# Patient Record
Sex: FEMALE | Race: OTHER | Employment: FULL TIME | URBAN - METROPOLITAN AREA
[De-identification: names, ages, dates, MRNs, and addresses within clinical notes are randomized per-mention and may not be internally consistent; named-entity substitution may affect disease eponyms.]

---

## 2018-06-08 ENCOUNTER — OFFICE VISIT (OUTPATIENT)
Dept: INTERNAL MEDICINE CLINIC | Facility: CLINIC | Age: 33
End: 2018-06-08

## 2018-06-08 VITALS
WEIGHT: 215 LBS | OXYGEN SATURATION: 98 % | SYSTOLIC BLOOD PRESSURE: 128 MMHG | DIASTOLIC BLOOD PRESSURE: 85 MMHG | HEIGHT: 59 IN | HEART RATE: 89 BPM | BODY MASS INDEX: 43.34 KG/M2 | TEMPERATURE: 98 F

## 2018-06-08 DIAGNOSIS — Z12.4 PAP SMEAR FOR CERVICAL CANCER SCREENING: ICD-10-CM

## 2018-06-08 DIAGNOSIS — Z00.00 ROUTINE ADULT HEALTH MAINTENANCE: Primary | ICD-10-CM

## 2018-06-08 DIAGNOSIS — IMO0001 UNCONTROLLED TYPE 2 DIABETES MELLITUS WITHOUT COMPLICATION, WITHOUT LONG-TERM CURRENT USE OF INSULIN: ICD-10-CM

## 2018-06-08 DIAGNOSIS — Z23 NEED FOR VACCINATION: ICD-10-CM

## 2018-06-08 DIAGNOSIS — N93.8 DUB (DYSFUNCTIONAL UTERINE BLEEDING): ICD-10-CM

## 2018-06-08 PROCEDURE — 90471 IMMUNIZATION ADMIN: CPT | Performed by: INTERNAL MEDICINE

## 2018-06-08 PROCEDURE — 99385 PREV VISIT NEW AGE 18-39: CPT | Performed by: INTERNAL MEDICINE

## 2018-06-08 PROCEDURE — 90732 PPSV23 VACC 2 YRS+ SUBQ/IM: CPT | Performed by: INTERNAL MEDICINE

## 2018-06-08 PROCEDURE — 81002 URINALYSIS NONAUTO W/O SCOPE: CPT | Performed by: INTERNAL MEDICINE

## 2018-06-08 NOTE — PROGRESS NOTES
HPI:    Patient ID: Luba Shrestha is a 28year old female.     HPI   Luba Shrestha is a 28year old female who presents for a complete physical exam.   HPI:   Patient presents with:  Physical: due for labs ,patient dx with DM2 not on medication  Diabetes  Patient Grandfather      Type 1, IDDM with retinoapthy.     • Stroke Paternal Grandfather    • PVD [OTHER] Paternal Grandfather    • retinopathy [OTHER] Paternal Grandfather       Social History:  Social History    Marital status: Single              Spouse name: stridor. Cardiovascular: Negative. Negative for chest pain, palpitations and leg swelling. Gastrointestinal: Negative for abdominal distention, abdominal pain, anal bleeding, blood in stool, constipation, diarrhea, nausea, rectal pain and vomiting. status: Never Smoker                                                              Smokeless tobacco: Never Used                      Alcohol use:  Yes              Comment: socially        PHYSICAL EXAM:    Physical Exam   Constitutional: She is oriented to Pupils are equal, round, and reactive to light. Right eye exhibits no chemosis, no discharge, no exudate and no hordeolum. No foreign body present in the right eye. Left eye exhibits no chemosis, no discharge, no exudate and no hordeolum.  No foreign body p splenomegaly or hepatomegaly. There is no tenderness. There is no rigidity, no rebound, no guarding and no CVA tenderness. Genitourinary: No breast swelling, tenderness, discharge or bleeding. Pelvic exam was performed with patient supine.    Musculoskele and excercise at least 30 minutes 3-4 times a week. Check sugars at different times on different dates. Careful with low sugars. Carry something with you and check sugar if can. Can carry dylan cracker, etc. Decrease carbohydrates.  But also, careful with

## 2018-06-08 NOTE — PATIENT INSTRUCTIONS
ASSESSMENT/PLAN:   Routine adult health maintenance  (primary encounter diagnosis)  Check blood and urine. Last day of menses now. Uncontrolled type 2 diabetes mellitus without complication, without long-term current use of insulin (hcc)? Control.      Aury Tan

## 2018-06-10 PROBLEM — I10 ESSENTIAL HYPERTENSION: Status: ACTIVE | Noted: 2018-06-10

## 2018-06-11 ENCOUNTER — PATIENT MESSAGE (OUTPATIENT)
Dept: INTERNAL MEDICINE CLINIC | Facility: CLINIC | Age: 33
End: 2018-06-11

## 2018-06-11 NOTE — TELEPHONE ENCOUNTER
From: Tim Longo  To: Paulo Garcia MD  Sent: 6/11/2018 1:55 PM CDT  Subject: Prescription Question    Also, I misplaced the prescription for the glucose machine. Is there anyway I can get a new one emailed to me.     Eliot Dawn

## 2018-06-18 ENCOUNTER — TELEPHONE (OUTPATIENT)
Dept: INTERNAL MEDICINE CLINIC | Facility: CLINIC | Age: 33
End: 2018-06-18

## 2018-06-18 RX ORDER — BLOOD SUGAR DIAGNOSTIC
STRIP MISCELLANEOUS
Refills: 0 | Status: CANCELLED | OUTPATIENT
Start: 2018-06-18

## 2018-06-18 RX ORDER — LANCETS
EACH MISCELLANEOUS
Qty: 200 EACH | Refills: 3 | Status: SHIPPED | OUTPATIENT
Start: 2018-06-18 | End: 2019-01-24

## 2018-06-18 NOTE — TELEPHONE ENCOUNTER
Needs refill     Current Outpatient Prescriptions:   •   •  Insulin Pen Needle (BD PEN NEEDLE ULTRAFINE) 29G X 12.7MM Does not apply Misc, Is nightly., Disp: 50 each, Rfl: 6  •

## 2018-06-18 NOTE — TELEPHONE ENCOUNTER
Patient called requesting to speak to [facility]INO SIDDIQI regarding needle size

## 2018-06-20 ENCOUNTER — TELEPHONE (OUTPATIENT)
Dept: INTERNAL MEDICINE CLINIC | Facility: CLINIC | Age: 33
End: 2018-06-20

## 2018-06-20 DIAGNOSIS — IMO0001 UNCONTROLLED TYPE 2 DIABETES MELLITUS WITHOUT COMPLICATION, WITHOUT LONG-TERM CURRENT USE OF INSULIN: Primary | ICD-10-CM

## 2018-06-20 NOTE — TELEPHONE ENCOUNTER
Pt stated that Blood Glucose Monitoring Suppl ([address]   [address]) w/Device Does not apply Kit it's not cover by insurance. Insurance covers Contour next, requested for blood glucose monitor and supplies to be switch.  Informed that Dr. Aleida Rodríguez isn't in t

## 2018-06-21 NOTE — TELEPHONE ENCOUNTER
Spoke with nikhil and they state pt's insurance covers 100% of contour next, gave verbal order for glucose monitoring kit.

## 2018-07-02 RX ORDER — GLYBURIDE 2.5 MG/1
2.5 TABLET ORAL 2 TIMES DAILY WITH MEALS
Qty: 60 TABLET | Refills: 0 | Status: CANCELLED
Start: 2018-07-02

## 2018-07-02 RX ORDER — ATORVASTATIN CALCIUM 40 MG/1
40 TABLET, FILM COATED ORAL NIGHTLY
Qty: 30 TABLET | Refills: 3 | Status: CANCELLED
Start: 2018-07-02

## 2018-08-02 ENCOUNTER — OFFICE VISIT (OUTPATIENT)
Dept: INTERNAL MEDICINE CLINIC | Facility: CLINIC | Age: 33
End: 2018-08-02

## 2018-08-02 VITALS
TEMPERATURE: 99 F | WEIGHT: 219 LBS | OXYGEN SATURATION: 98 % | HEIGHT: 59 IN | DIASTOLIC BLOOD PRESSURE: 73 MMHG | HEART RATE: 97 BPM | SYSTOLIC BLOOD PRESSURE: 118 MMHG | BODY MASS INDEX: 44.15 KG/M2

## 2018-08-02 DIAGNOSIS — N93.8 DUB (DYSFUNCTIONAL UTERINE BLEEDING): ICD-10-CM

## 2018-08-02 DIAGNOSIS — Z12.4 PAP SMEAR FOR CERVICAL CANCER SCREENING: ICD-10-CM

## 2018-08-02 DIAGNOSIS — IMO0001 UNCONTROLLED TYPE 2 DIABETES MELLITUS WITHOUT COMPLICATION, WITHOUT LONG-TERM CURRENT USE OF INSULIN: Primary | ICD-10-CM

## 2018-08-02 DIAGNOSIS — I10 ESSENTIAL HYPERTENSION: ICD-10-CM

## 2018-08-02 PROCEDURE — 99214 OFFICE O/P EST MOD 30 MIN: CPT | Performed by: INTERNAL MEDICINE

## 2018-08-02 RX ORDER — ATORVASTATIN CALCIUM 40 MG/1
40 TABLET, FILM COATED ORAL NIGHTLY
Qty: 90 TABLET | Refills: 1 | Status: SHIPPED | OUTPATIENT
Start: 2018-08-02 | End: 2018-10-26

## 2018-08-02 RX ORDER — GLYBURIDE 2.5 MG/1
2.5 TABLET ORAL 2 TIMES DAILY WITH MEALS
Qty: 60 TABLET | Refills: 1 | Status: SHIPPED | OUTPATIENT
Start: 2018-08-02 | End: 2018-10-07

## 2018-08-02 RX ORDER — GLYBURIDE 2.5 MG/1
TABLET ORAL
Qty: 90 TABLET | Refills: 1 | Status: SHIPPED | OUTPATIENT
Start: 2018-08-02 | End: 2018-08-02

## 2018-08-02 NOTE — PATIENT INSTRUCTIONS
ASSESSMENT/PLAN:   Uncontrolled type 2 diabetes mellitus without complication, without long-term current use of insulin (hcc)  (primary encounter diagnosis) Not well controlled. Increase lantus 15 units at night. Add metforrn 500 mg every 12 hrs.  Pre meals

## 2018-08-02 NOTE — PROGRESS NOTES
HPI:    Patient ID: Arnold Reid is a 28year old female.     HPI  Arnold Reid is a 28year old female who presents for a complete physical exam.   HPI:   Patient presents with:  Diabetes  Gyn Exam      Symptoms: denies discharge, itching, burning or dysuria, (L) 06/08/2018 03:18 PM   K 4.2 06/08/2018 03:18 PM   CL 97 06/08/2018 03:18 PM   CO2 26 06/08/2018 03:18 PM   CREATSERUM 0.62 06/08/2018 03:18 PM   CA 9.4 06/08/2018 03:18 PM   ALB 4.2 06/08/2018 03:18 PM   TP 7.0 06/08/2018 03:18 PM   ALKPHO 67 06/08/201 file.   No past surgical history on file. Family History   Problem Relation Age of Onset   • Hypertension Father    • Diabetes Father    • Diabetes Mother    • EToHism.   [OTHER] Maternal Grandmother    • Diabetes Paternal Grandmother    • Hypertension Pa frequency, genital sores, hematuria, pelvic pain, urgency, vaginal bleeding, vaginal discharge and vaginal pain. Neurological: Negative for dizziness, tremors, seizures, syncope, weakness, light-headedness, numbness and headaches.    Psychiatric/Behaviora Type 1, IDDM with retinoapthy.     • Stroke Paternal Grandfather    • PVD [OTHER] Paternal Grandfather    • retinopathy [OTHER] Paternal Grandfather       Social History: Smoking status: Never Smoker no mass, no tenderness and no fullness. No erythema, tenderness or bleeding in the vagina. No foreign body in the vagina. No signs of injury around the vagina. No vaginal discharge found. Musculoskeletal: She exhibits no edema.    Lymphadenopathy: Visit:  Signed Prescriptions Disp Refills    insulin detemir (LEVEMIR FLEXTOUCH) 100 UNIT/ML Subcutaneous Solution Pen-injector 3 pen 1      Sig: 15 units qhs.      atorvastatin (LIPITOR) 40 MG Oral Tab 90 tablet 1      Sig: Take 1 tablet (40 mg total) by

## 2018-08-03 LAB — HPV I/H RISK 1 DNA SPEC QL NAA+PROBE: NEGATIVE

## 2018-08-29 NOTE — TELEPHONE ENCOUNTER
From: Rhea Valderrama  Sent: 8/27/2018 7:07 PM CDT  Subject: Medication Renewal Request    Rhea Valderrama would like a refill of the following medications:     MetFORMIN HCl 500 MG Oral Tab [Althea Benitez MD]    Preferred pharmacy: [phone] -

## 2018-09-13 ENCOUNTER — OFFICE VISIT (OUTPATIENT)
Dept: INTERNAL MEDICINE CLINIC | Facility: CLINIC | Age: 33
End: 2018-09-13

## 2018-09-13 ENCOUNTER — TELEPHONE (OUTPATIENT)
Dept: INTERNAL MEDICINE CLINIC | Facility: CLINIC | Age: 33
End: 2018-09-13

## 2018-09-13 VITALS
WEIGHT: 221 LBS | BODY MASS INDEX: 44.55 KG/M2 | SYSTOLIC BLOOD PRESSURE: 125 MMHG | TEMPERATURE: 98 F | HEIGHT: 59 IN | HEART RATE: 92 BPM | OXYGEN SATURATION: 98 % | DIASTOLIC BLOOD PRESSURE: 86 MMHG

## 2018-09-13 DIAGNOSIS — N93.8 DUB (DYSFUNCTIONAL UTERINE BLEEDING): ICD-10-CM

## 2018-09-13 DIAGNOSIS — I10 ESSENTIAL HYPERTENSION: ICD-10-CM

## 2018-09-13 DIAGNOSIS — IMO0001 UNCONTROLLED TYPE 2 DIABETES MELLITUS WITHOUT COMPLICATION, WITHOUT LONG-TERM CURRENT USE OF INSULIN: ICD-10-CM

## 2018-09-13 DIAGNOSIS — R51.9 BILATERAL HEADACHES: Primary | ICD-10-CM

## 2018-09-13 PROCEDURE — 99214 OFFICE O/P EST MOD 30 MIN: CPT | Performed by: INTERNAL MEDICINE

## 2018-09-13 RX ORDER — ONDANSETRON 4 MG/1
4 TABLET, ORALLY DISINTEGRATING ORAL EVERY 8 HOURS PRN
Qty: 30 TABLET | Refills: 0 | Status: SHIPPED | OUTPATIENT
Start: 2018-09-13 | End: 2020-06-18

## 2018-09-13 RX ORDER — ONDANSETRON 4 MG/1
4 TABLET, ORALLY DISINTEGRATING ORAL ONCE
Status: DISCONTINUED | OUTPATIENT
Start: 2018-09-13 | End: 2020-09-15

## 2018-09-13 NOTE — PATIENT INSTRUCTIONS
ASSESSMENT/PLAN:   Bilateral headaches  (primary encounter diagnosis) Headache diary. Try diclofenac as needed with food. No motrin, ibuprofen, advil, alleve, naprosyn  with these medications. Take zofran as needed. Prior ro diclofenac.  Follow up with eye

## 2018-09-13 NOTE — PROGRESS NOTES
HPI:    Patient ID: Justa Finch is a 28year old female. Hologram images B/L eyes and N and headache. GM with headache. Stress at work. Starts with L and then to R. Lasting X > 1 hrs. No menses 9-6-18.  Feels like should be getting (Breast T and cramp CHOLEST 323 (H) 06/08/2018 03:18 PM    HDL 60 06/08/2018 03:18 PM    TRIG 229 (H) 06/08/2018 03:18 PM     (H) 06/08/2018 03:18 PM    NONHDLC 263 (H) 06/08/2018 03:18 PM       Lab Results   Component Value Date/Time    A1C 12.2 (H) 06/08/2018 03:1 Negative for photophobia, pain, discharge, redness and itching. Respiratory: Negative. Negative for apnea, cough, choking, chest tightness, shortness of breath, wheezing and stridor. Cardiovascular: Negative.   Negative for chest pain, palpitations an Suppl ([address]   [address]) w/Device Does not apply Kit Testing twice a day Diagnosis E11.65 patient is insulin dependent Disp: 1 kit Rfl: 0   Glucose Blood (ONETOUCH ULTRA BLUE) In Vitro Strip Testing twice  A day,  Diagnosis E11.65  Patient is insulin Left Ear: Tympanic membrane, external ear and ear canal normal. No lacerations. No drainage, swelling or tenderness. No foreign bodies. No mastoid tenderness.  Tympanic membrane is not injected, not scarred, not perforated, not erythematous, not retracted heart sounds, intact distal pulses and normal pulses. Pulses:       Carotid pulses are 2+ on the right side, and 2+ on the left side. Radial pulses are 2+ on the right side, and 2+ on the left side.         Dorsalis pedis pulses are 2+ on the right ro diclofenac. Follow up with eye MD.     Uncontrolled type 2 diabetes mellitus without complication, without long-term current use of insulin (hcc) Much better. Careful with diet and excercise at least 30 minutes 3-4 times a week.  Check sugars at different

## 2018-09-13 NOTE — TELEPHONE ENCOUNTER
Patient stated that Dr. Ji Credit needs to see her mid October. For blood work and check on diabetic medication. Was unable to find a open slot. Please advise.

## 2018-09-20 ENCOUNTER — HOSPITAL ENCOUNTER (OUTPATIENT)
Dept: ENDOCRINOLOGY | Facility: HOSPITAL | Age: 33
Discharge: HOME | End: 2018-09-20
Attending: INTERNAL MEDICINE
Payer: COMMERCIAL

## 2018-09-20 VITALS — WEIGHT: 221.88 LBS | BODY MASS INDEX: 45 KG/M2

## 2018-09-20 DIAGNOSIS — IMO0001 UNCONTROLLED TYPE 2 DIABETES MELLITUS WITHOUT COMPLICATION, WITHOUT LONG-TERM CURRENT USE OF INSULIN: Primary | ICD-10-CM

## 2018-09-20 NOTE — PROGRESS NOTES
Kieran Miranda  : 1985 attended initial assessment for Diabetes Education:    Date: 2018   Start time: 8:05 am End time: 9:05 am    Wt 221 lb 14.4 oz   BMI 44.82 kg/m²     Glycohemoglobin (HgA1c) (%)   Date Value   2018 12.2 (H)      Isidoro Gustafson i and has no further questions at this time.     Angelique Gudino RN

## 2018-10-03 ENCOUNTER — APPOINTMENT (OUTPATIENT)
Dept: ENDOCRINOLOGY | Facility: HOSPITAL | Age: 33
End: 2018-10-03
Attending: INTERNAL MEDICINE
Payer: COMMERCIAL

## 2018-10-08 ENCOUNTER — TELEPHONE (OUTPATIENT)
Dept: INTERNAL MEDICINE CLINIC | Facility: CLINIC | Age: 33
End: 2018-10-08

## 2018-10-08 RX ORDER — GLYBURIDE 2.5 MG/1
2.5 TABLET ORAL 2 TIMES DAILY WITH MEALS
Qty: 60 TABLET | Refills: 1 | Status: SHIPPED | OUTPATIENT
Start: 2018-10-08 | End: 2018-10-26

## 2018-10-10 ENCOUNTER — APPOINTMENT (OUTPATIENT)
Dept: ENDOCRINOLOGY | Facility: HOSPITAL | Age: 33
End: 2018-10-10
Attending: INTERNAL MEDICINE
Payer: COMMERCIAL

## 2018-10-17 ENCOUNTER — APPOINTMENT (OUTPATIENT)
Dept: ENDOCRINOLOGY | Facility: HOSPITAL | Age: 33
End: 2018-10-17
Attending: INTERNAL MEDICINE
Payer: COMMERCIAL

## 2018-10-26 RX ORDER — ATORVASTATIN CALCIUM 40 MG/1
40 TABLET, FILM COATED ORAL NIGHTLY
Qty: 90 TABLET | Refills: 1 | Status: SHIPPED | OUTPATIENT
Start: 2018-10-26 | End: 2019-04-03

## 2018-10-26 RX ORDER — GLYBURIDE 2.5 MG/1
2.5 TABLET ORAL 2 TIMES DAILY WITH MEALS
Qty: 60 TABLET | Refills: 1 | Status: SHIPPED | OUTPATIENT
Start: 2018-10-26 | End: 2019-01-24

## 2018-11-28 ENCOUNTER — APPOINTMENT (OUTPATIENT)
Dept: ENDOCRINOLOGY | Facility: HOSPITAL | Age: 33
End: 2018-11-28
Attending: INTERNAL MEDICINE
Payer: COMMERCIAL

## 2018-11-28 RX ORDER — INSULIN DETEMIR 100 [IU]/ML
INJECTION, SOLUTION SUBCUTANEOUS
Qty: 9 ML | Refills: 0 | Status: SHIPPED | OUTPATIENT
Start: 2018-11-28 | End: 2019-01-24

## 2018-12-05 ENCOUNTER — APPOINTMENT (OUTPATIENT)
Dept: ENDOCRINOLOGY | Facility: HOSPITAL | Age: 33
End: 2018-12-05
Attending: INTERNAL MEDICINE
Payer: COMMERCIAL

## 2018-12-12 ENCOUNTER — APPOINTMENT (OUTPATIENT)
Dept: ENDOCRINOLOGY | Facility: HOSPITAL | Age: 33
End: 2018-12-12
Attending: INTERNAL MEDICINE
Payer: COMMERCIAL

## 2019-01-14 ENCOUNTER — TELEPHONE (OUTPATIENT)
Dept: INTERNAL MEDICINE CLINIC | Facility: CLINIC | Age: 34
End: 2019-01-14

## 2019-01-14 DIAGNOSIS — IMO0001 UNCONTROLLED TYPE 2 DIABETES MELLITUS WITHOUT COMPLICATION, WITHOUT LONG-TERM CURRENT USE OF INSULIN: Primary | ICD-10-CM

## 2019-01-14 DIAGNOSIS — E78.00 HIGH CHOLESTEROL: ICD-10-CM

## 2019-01-14 NOTE — TELEPHONE ENCOUNTER
Due for both lipid panel and A1c. So that would be fasting. Orders placed. Recommend ER evaluation. May need some imaging.

## 2019-01-14 NOTE — TELEPHONE ENCOUNTER
1) patient fell today on tailbone, in a lot of pain   Is 9 on pain scale, pain and diffliculy with walking, sitting, bending. Advise needs to be evaluated. Will go to ER. 2) asking for order A1C you told her you wanted her to go for A1C for recheck.

## 2019-01-15 NOTE — TELEPHONE ENCOUNTER
Left message on voice mail for patient lab orders in computer need to be fasting for blood testing.     Should go to ER for evaluation

## 2019-01-22 ENCOUNTER — APPOINTMENT (OUTPATIENT)
Dept: LAB | Age: 34
End: 2019-01-22
Attending: INTERNAL MEDICINE
Payer: COMMERCIAL

## 2019-01-22 DIAGNOSIS — E78.00 HIGH CHOLESTEROL: ICD-10-CM

## 2019-01-22 DIAGNOSIS — IMO0001 UNCONTROLLED TYPE 2 DIABETES MELLITUS WITHOUT COMPLICATION, WITHOUT LONG-TERM CURRENT USE OF INSULIN: ICD-10-CM

## 2019-01-22 LAB
CHOLEST SERPL-MCNC: 178 MG/DL (ref 110–200)
EST. AVERAGE GLUCOSE BLD GHB EST-MCNC: 246 MG/DL (ref 68–126)
HBA1C MFR BLD HPLC: 10.2 % (ref ?–5.7)
HDLC SERPL-MCNC: 50 MG/DL
LDLC SERPL CALC-MCNC: 101 MG/DL (ref 0–99)
NONHDLC SERPL-MCNC: 128 MG/DL
TRIGL SERPL-MCNC: 135 MG/DL (ref 1–149)

## 2019-01-22 PROCEDURE — 36415 COLL VENOUS BLD VENIPUNCTURE: CPT

## 2019-01-22 PROCEDURE — 83036 HEMOGLOBIN GLYCOSYLATED A1C: CPT

## 2019-01-22 PROCEDURE — 80061 LIPID PANEL: CPT

## 2019-01-24 ENCOUNTER — HOSPITAL ENCOUNTER (OUTPATIENT)
Dept: GENERAL RADIOLOGY | Facility: HOSPITAL | Age: 34
Discharge: HOME | End: 2019-01-24
Attending: INTERNAL MEDICINE
Payer: COMMERCIAL

## 2019-01-24 ENCOUNTER — OFFICE VISIT (OUTPATIENT)
Dept: INTERNAL MEDICINE CLINIC | Facility: CLINIC | Age: 34
End: 2019-01-24

## 2019-01-24 VITALS
OXYGEN SATURATION: 98 % | DIASTOLIC BLOOD PRESSURE: 89 MMHG | TEMPERATURE: 99 F | HEART RATE: 92 BPM | WEIGHT: 240 LBS | SYSTOLIC BLOOD PRESSURE: 146 MMHG | BODY MASS INDEX: 48.38 KG/M2 | HEIGHT: 59 IN

## 2019-01-24 DIAGNOSIS — M54.42 ACUTE MIDLINE LOW BACK PAIN WITH BILATERAL SCIATICA: ICD-10-CM

## 2019-01-24 DIAGNOSIS — I10 ESSENTIAL HYPERTENSION: Primary | ICD-10-CM

## 2019-01-24 DIAGNOSIS — E78.00 HIGH CHOLESTEROL: ICD-10-CM

## 2019-01-24 DIAGNOSIS — M54.41 ACUTE MIDLINE LOW BACK PAIN WITH BILATERAL SCIATICA: ICD-10-CM

## 2019-01-24 DIAGNOSIS — IMO0001 UNCONTROLLED TYPE 2 DIABETES MELLITUS WITHOUT COMPLICATION, WITHOUT LONG-TERM CURRENT USE OF INSULIN: ICD-10-CM

## 2019-01-24 PROCEDURE — 99214 OFFICE O/P EST MOD 30 MIN: CPT | Performed by: INTERNAL MEDICINE

## 2019-01-24 PROCEDURE — 72110 X-RAY EXAM L-2 SPINE 4/>VWS: CPT | Performed by: INTERNAL MEDICINE

## 2019-01-24 PROCEDURE — 73521 X-RAY EXAM HIPS BI 2 VIEWS: CPT | Performed by: INTERNAL MEDICINE

## 2019-01-24 RX ORDER — TIZANIDINE 4 MG/1
4 TABLET ORAL NIGHTLY
Qty: 10 TABLET | Refills: 0 | Status: SHIPPED | OUTPATIENT
Start: 2019-01-24 | End: 2019-02-03

## 2019-01-24 RX ORDER — NABUMETONE 750 MG/1
750 TABLET, FILM COATED ORAL 2 TIMES DAILY
Qty: 20 TABLET | Refills: 0 | Status: SHIPPED | OUTPATIENT
Start: 2019-01-24 | End: 2019-02-03

## 2019-01-24 RX ORDER — GLYBURIDE 2.5 MG/1
2.5 TABLET ORAL 2 TIMES DAILY WITH MEALS
Qty: 180 TABLET | Refills: 1 | Status: SHIPPED | OUTPATIENT
Start: 2019-01-24 | End: 2019-04-25

## 2019-01-24 RX ORDER — ACETAMINOPHEN AND CODEINE PHOSPHATE 300; 30 MG/1; MG/1
1 TABLET ORAL EVERY 6 HOURS PRN
Qty: 15 TABLET | Refills: 0 | Status: SHIPPED | OUTPATIENT
Start: 2019-01-24 | End: 2020-06-18

## 2019-01-24 NOTE — PATIENT INSTRUCTIONS
ASSESSMENT/PLAN:   Essential hypertension  (primary encounter diagnosis) Good control. Careful with diet and excercise at least 30 minutes 3-4 times a week. Check blood pressures at different times on different days.  Can purchase own blood pressure monitor (750 mg total) by mouth 2 (two) times daily for 10 days. • TiZANidine HCl 4 MG Oral Tab 10 tablet 0     Sig: Take 1 tablet (4 mg total) by mouth nightly for 10 days.    • Acetaminophen-Codeine (TYLENOL WITH CODEINE #3) 300-30 MG Oral Tab 15 tablet 0     S

## 2019-01-24 NOTE — PROGRESS NOTES
HPI:    Patient ID: Sal Recinos is a 28year old female. HPI   Diabetes  Patient here for follow up of Diabetes. Has been taking medications regularly. Checks sugars 1 times daily. Fasting sugars average 140-170's. 2 hrs. Post meals at 190's.  [employer] 09/13/18 : 221 lb (100.2 kg)    BP Readings from Last 3 Encounters:  01/24/19 : 146/89  09/13/18 : 125/86  08/02/18 : 118/73    Labs:   Lab Results   Component Value Date/Time     (H) 06/08/2018 03:18 PM     (L) 06/08/2018 03:18 PM    K 4.2 06 Psychiatric/Behavioral: Negative for decreased concentration and sleep disturbance. All other systems reviewed and are negative. Current Outpatient Medications:  MetFORMIN HCl 500 MG Oral Tab 1 po q12 hrs.  Disp: 180 tablet Rfl: 1   glyBURIDE 2.5 Type 1, IDDM with retinoapthy.     • Stroke Paternal Grandfather    • Other (PVD) Paternal Grandfather    • Other (retinopathy) Paternal Grandfather       Social History: Social History    Tobacco Use      Smoking status: Never Smoker      Smokeless to Right knee: She exhibits normal range of motion, no swelling, no effusion, no ecchymosis, no deformity, no laceration, no erythema, normal alignment, no LCL laxity, normal patellar mobility, no bony tenderness, normal meniscus and no MCL laxity.  No te Essential hypertension  (primary encounter diagnosis) Good control. Careful with diet and excercise at least 30 minutes 3-4 times a week. Check blood pressures at different times on different days. Can purchase own blood pressure monitor.  If not, check at Sig: Take 1 tablet (750 mg total) by mouth 2 (two) times daily for 10 days. • TiZANidine HCl 4 MG Oral Tab 10 tablet 0     Sig: Take 1 tablet (4 mg total) by mouth nightly for 10 days.    • Acetaminophen-Codeine (TYLENOL WITH CODEINE #3) 300-30 MG Oral

## 2019-01-29 RX ORDER — INSULIN DETEMIR 100 [IU]/ML
INJECTION, SOLUTION SUBCUTANEOUS
Qty: 9 ML | Refills: 0 | OUTPATIENT
Start: 2019-01-29

## 2019-04-03 RX ORDER — ATORVASTATIN CALCIUM 40 MG/1
40 TABLET, FILM COATED ORAL NIGHTLY
Qty: 90 TABLET | Refills: 1 | Status: SHIPPED | OUTPATIENT
Start: 2019-04-03 | End: 2019-07-03

## 2019-04-25 RX ORDER — GLYBURIDE 2.5 MG/1
2.5 TABLET ORAL 2 TIMES DAILY WITH MEALS
Qty: 180 TABLET | Refills: 1 | Status: SHIPPED | OUTPATIENT
Start: 2019-04-25 | End: 2019-10-24

## 2019-04-29 ENCOUNTER — TELEPHONE (OUTPATIENT)
Dept: INTERNAL MEDICINE CLINIC | Facility: CLINIC | Age: 34
End: 2019-04-29

## 2019-04-29 DIAGNOSIS — IMO0001 UNCONTROLLED TYPE 2 DIABETES MELLITUS WITHOUT COMPLICATION, WITHOUT LONG-TERM CURRENT USE OF INSULIN: Primary | ICD-10-CM

## 2019-05-29 ENCOUNTER — TELEPHONE (OUTPATIENT)
Dept: INTERNAL MEDICINE CLINIC | Facility: CLINIC | Age: 34
End: 2019-05-29

## 2019-05-29 DIAGNOSIS — IMO0001 UNCONTROLLED TYPE 2 DIABETES MELLITUS WITHOUT COMPLICATION, WITHOUT LONG-TERM CURRENT USE OF INSULIN: Primary | ICD-10-CM

## 2019-05-29 NOTE — TELEPHONE ENCOUNTER
patient want to know if ok to wait to see her until October or can you order blood test ahead of time . She will be be gone for the month of September .  Pt is aware you are not in on wenesdays

## 2019-05-30 NOTE — TELEPHONE ENCOUNTER
Can order blood testing ahead of time. Can wait til Oct. But then she will be due for complete physical.  And for future labs with urine also that she will be due.

## 2019-05-31 ENCOUNTER — APPOINTMENT (OUTPATIENT)
Dept: LAB | Age: 34
End: 2019-05-31
Attending: INTERNAL MEDICINE
Payer: COMMERCIAL

## 2019-05-31 DIAGNOSIS — IMO0001 UNCONTROLLED TYPE 2 DIABETES MELLITUS WITHOUT COMPLICATION, WITHOUT LONG-TERM CURRENT USE OF INSULIN: ICD-10-CM

## 2019-05-31 PROCEDURE — 82570 ASSAY OF URINE CREATININE: CPT

## 2019-05-31 PROCEDURE — 82043 UR ALBUMIN QUANTITATIVE: CPT

## 2019-05-31 PROCEDURE — 80061 LIPID PANEL: CPT

## 2019-05-31 PROCEDURE — 36415 COLL VENOUS BLD VENIPUNCTURE: CPT

## 2019-05-31 PROCEDURE — 80053 COMPREHEN METABOLIC PANEL: CPT

## 2019-05-31 PROCEDURE — 83036 HEMOGLOBIN GLYCOSYLATED A1C: CPT

## 2019-06-28 ENCOUNTER — MED REC SCAN ONLY (OUTPATIENT)
Dept: INTERNAL MEDICINE CLINIC | Facility: CLINIC | Age: 34
End: 2019-06-28

## 2019-07-03 RX ORDER — ATORVASTATIN CALCIUM 40 MG/1
TABLET, FILM COATED ORAL
Qty: 90 TABLET | Refills: 1 | Status: SHIPPED | OUTPATIENT
Start: 2019-07-03 | End: 2019-10-24

## 2019-07-03 NOTE — TELEPHONE ENCOUNTER
Refill passed per [facility] protocol.   Cholesterol Medications  Protocol Criteria:  · Appointment scheduled in the past 12 months or in the next 3 months  · ALT & LDL on file in the past 12 months  · ALT result < 80  · LDL result <130   Recent Outpat

## 2019-10-24 ENCOUNTER — OFFICE VISIT (OUTPATIENT)
Dept: INTERNAL MEDICINE CLINIC | Facility: CLINIC | Age: 34
End: 2019-10-24

## 2019-10-24 ENCOUNTER — MED REC SCAN ONLY (OUTPATIENT)
Dept: INTERNAL MEDICINE CLINIC | Facility: CLINIC | Age: 34
End: 2019-10-24

## 2019-10-24 VITALS
WEIGHT: 228.63 LBS | TEMPERATURE: 98 F | SYSTOLIC BLOOD PRESSURE: 138 MMHG | HEART RATE: 90 BPM | RESPIRATION RATE: 18 BRPM | HEIGHT: 58 IN | BODY MASS INDEX: 47.99 KG/M2 | OXYGEN SATURATION: 98 % | DIASTOLIC BLOOD PRESSURE: 85 MMHG

## 2019-10-24 DIAGNOSIS — M54.9 OTHER CHRONIC BACK PAIN: ICD-10-CM

## 2019-10-24 DIAGNOSIS — E78.00 HIGH CHOLESTEROL: ICD-10-CM

## 2019-10-24 DIAGNOSIS — IMO0001 UNCONTROLLED TYPE 2 DIABETES MELLITUS WITHOUT COMPLICATION, WITHOUT LONG-TERM CURRENT USE OF INSULIN: ICD-10-CM

## 2019-10-24 DIAGNOSIS — E66.01 CLASS 3 SEVERE OBESITY DUE TO EXCESS CALORIES WITH SERIOUS COMORBIDITY AND BODY MASS INDEX (BMI) OF 45.0 TO 49.9 IN ADULT (HCC): ICD-10-CM

## 2019-10-24 DIAGNOSIS — I10 ESSENTIAL HYPERTENSION: Primary | ICD-10-CM

## 2019-10-24 DIAGNOSIS — G89.29 OTHER CHRONIC BACK PAIN: ICD-10-CM

## 2019-10-24 PROBLEM — E66.9 OBESITY: Status: ACTIVE | Noted: 2019-10-24

## 2019-10-24 PROCEDURE — 99395 PREV VISIT EST AGE 18-39: CPT | Performed by: INTERNAL MEDICINE

## 2019-10-24 PROCEDURE — 90686 IIV4 VACC NO PRSV 0.5 ML IM: CPT | Performed by: INTERNAL MEDICINE

## 2019-10-24 PROCEDURE — 99213 OFFICE O/P EST LOW 20 MIN: CPT | Performed by: INTERNAL MEDICINE

## 2019-10-24 PROCEDURE — 90471 IMMUNIZATION ADMIN: CPT | Performed by: INTERNAL MEDICINE

## 2019-10-24 RX ORDER — GLYBURIDE 2.5 MG/1
2.5 TABLET ORAL 2 TIMES DAILY WITH MEALS
Qty: 180 TABLET | Refills: 1 | Status: SHIPPED | OUTPATIENT
Start: 2019-10-24 | End: 2020-06-18

## 2019-10-24 RX ORDER — ATORVASTATIN CALCIUM 40 MG/1
TABLET, FILM COATED ORAL
Qty: 90 TABLET | Refills: 1 | Status: SHIPPED | OUTPATIENT
Start: 2019-10-24 | End: 2020-09-17

## 2019-10-24 NOTE — PATIENT INSTRUCTIONS
ASSESSMENT/PLAN:   Essential hypertension  (primary encounter diagnosis) Better. Careful with diet and excercise at least 30 minutes 3-4 times a week. Check blood pressures at different times on different days. Can purchase own blood pressure monitor.  If UNDER THE SKIN EVERY NIGHT AT BEDTIME   • Diclofenac Sodium 50 MG Oral Tab EC 60 tablet 1     Sig: Take 1 tablet (50 mg total) by mouth 2 (two) times daily.        Imaging & Referrals:  FLULAVAL INFLUENZA VACCINE QUAD PRESERVATIVE FREE 0.5 ML  BARIATRICS -

## 2019-10-24 NOTE — PROGRESS NOTES
HPI:    Patient ID: Jamal Perry is a 27year old female.     HPI     Jamal Perry is a 27year old female who presents for a complete physical exam.   HPI:   Patient presents with:  Physical: annual     Hypertension  Patient is here for follow up of hypertensi Microalbumin Urine mg/dL - 11.10 - -   Microalb/Creatinine Calc <=30.0 ug/mg - 64. 5(H) - -   HGBA1C <5.7 % - 6. 0(H) - 10. 2(H)   Weight (enc vitals) - 228 lb 9.6 oz - 240 lb -   BP (enc vitals) - 138/85 - 146/89 -   Last Foot Exam - - - - -   Last Eye Exam Oral Tab, TAKE 1 TABLET BY MOUTH EVERY NIGHT, Disp: 90 tablet, Rfl: 1  glyBURIDE 2.5 MG Oral Tab, Take 1 tablet (2.5 mg total) by mouth 2 (two) times daily with meals.  1 tab po qam and 1 tab pre dinner., Disp: 180 tablet, Rfl: 1  metFORMIN HCl 500 MG Oral file    Occupational History      Occupation: HR.      Tobacco Use      Smoking status: Never Smoker      Smokeless tobacco: Never Used    Substance and Sexual Activity      Alcohol use: Yes        Frequency: Monthly or less        Comment: socially weakness, light-headedness, numbness and headaches. Hematological: Negative for adenopathy.    Psychiatric/Behavioral: Negative for agitation, behavioral problems, confusion, decreased concentration, dysphoric mood, hallucinations, self-injury, sleep dist Renal Disease Paternal Grandmother    • Diabetes Paternal Grandfather         Type 1, IDDM with retinoapthy.     • Stroke Paternal Grandfather    • Other (PVD) Paternal Grandfather    • Other (retinopathy) Paternal Grandfather       Social History: Social H clear and moist and mucous membranes are normal. Mucous membranes are not pale, not dry and not cyanotic. She does not have dentures. No oral lesions. No trismus in the jaw. No dental abscesses, uvula swelling, lacerations or dental caries.  No oropharyngea rales. She exhibits no tenderness. Right breast exhibits no inverted nipple, no mass, no nipple discharge, no skin change and no tenderness. Left breast exhibits no inverted nipple, no mass, no nipple discharge, no skin change and no tenderness.  No breast appearance is normal.  Bilateral sensation of both feet is normal.  Pulsation pedal pulse exam of both lower legs/feet is normal as well. ASSESSMENT/PLAN:   Essential hypertension  (primary encounter diagnosis) Better.  Careful with diet and excerci Si po q12 hrs.    • insulin detemir (LEVEMIR FLEXTOUCH) 100 UNIT/ML Subcutaneous Solution Pen-injector 2 pen 2     Sig: INJECT 20 UNITS UNDER THE SKIN EVERY NIGHT AT BEDTIME   • Diclofenac Sodium 50 MG Oral Tab EC 60 tablet 1     Sig: Take 1 tablet

## 2019-10-28 RX ORDER — GLYBURIDE 2.5 MG/1
TABLET ORAL
Qty: 180 TABLET | Refills: 0 | OUTPATIENT
Start: 2019-10-28

## 2019-10-31 ENCOUNTER — HOSPITAL ENCOUNTER (OUTPATIENT)
Dept: MRI IMAGING | Facility: HOSPITAL | Age: 34
Discharge: HOME | End: 2019-10-31
Attending: INTERNAL MEDICINE
Payer: COMMERCIAL

## 2019-10-31 DIAGNOSIS — G89.29 OTHER CHRONIC BACK PAIN: ICD-10-CM

## 2019-10-31 DIAGNOSIS — M54.9 OTHER CHRONIC BACK PAIN: ICD-10-CM

## 2019-10-31 PROCEDURE — 72195 MRI PELVIS W/O DYE: CPT | Performed by: INTERNAL MEDICINE

## 2019-12-02 ENCOUNTER — TELEPHONE (OUTPATIENT)
Dept: INTERNAL MEDICINE CLINIC | Facility: CLINIC | Age: 34
End: 2019-12-02

## 2019-12-02 ENCOUNTER — MED REC SCAN ONLY (OUTPATIENT)
Dept: INTERNAL MEDICINE CLINIC | Facility: CLINIC | Age: 34
End: 2019-12-02

## 2019-12-23 ENCOUNTER — TELEPHONE (OUTPATIENT)
Dept: INTERNAL MEDICINE CLINIC | Facility: CLINIC | Age: 34
End: 2019-12-23

## 2019-12-23 DIAGNOSIS — E11.65 UNCONTROLLED TYPE 2 DIABETES MELLITUS WITH HYPERGLYCEMIA (HCC): Primary | ICD-10-CM

## 2020-02-13 ENCOUNTER — OFFICE VISIT (OUTPATIENT)
Dept: SURGERY | Facility: CLINIC | Age: 35
End: 2020-02-13

## 2020-02-13 VITALS
OXYGEN SATURATION: 98 % | WEIGHT: 233 LBS | DIASTOLIC BLOOD PRESSURE: 90 MMHG | BODY MASS INDEX: 50.27 KG/M2 | HEART RATE: 98 BPM | HEIGHT: 57.2 IN | SYSTOLIC BLOOD PRESSURE: 140 MMHG

## 2020-02-13 DIAGNOSIS — E78.2 HYPERCHOLESTEROLEMIA WITH HYPERTRIGLYCERIDEMIA: Primary | ICD-10-CM

## 2020-02-13 DIAGNOSIS — E11.65 UNCONTROLLED TYPE 2 DIABETES MELLITUS WITH HYPERGLYCEMIA (HCC): ICD-10-CM

## 2020-02-13 DIAGNOSIS — I10 ESSENTIAL HYPERTENSION: ICD-10-CM

## 2020-02-13 DIAGNOSIS — E66.01 MORBID OBESITY WITH BMI OF 50.0-59.9, ADULT (HCC): ICD-10-CM

## 2020-02-13 PROCEDURE — 99204 OFFICE O/P NEW MOD 45 MIN: CPT | Performed by: INTERNAL MEDICINE

## 2020-02-13 NOTE — PROGRESS NOTES
[facility] Consultation Note       Patient:  Dennie George  :      1985  MRN:      [id number]    Referring Provider: Dr. Lisbeth Muro       Chief Complaint:  Patient presents with:  Consult  Weight Management      SUBJECTIVE     History CODEINE #3) 300-30 MG Oral Tab Take 1 tablet by mouth every 6 (six) hours as needed for Pain.  (Patient not taking: Reported on 2/13/2020 ) 15 tablet 0   • Insulin Pen Needle (BD PEN NEEDLE OSEI U/F) 32G X 4 MM Does not apply Misc Injecting once a day  Diag abused: Not on file        Physically abused: Not on file        Forced sexual activity: Not on file    Other Topics      Concerns:        Not on file    Social History Narrative      Not on file    Surgical History:  History reviewed.  No pertinent surgica negative  Gastrointestinal: negative  Musculoskeletal:positive for arthralgias and back pain  Neurological: positive for headaches  Behavioral/Psych: positive for stress  Endocrine: negative  All other systems were reviewed and are negative.     Physical Ex with BMI of 50.0-59.9, adult [facility])    PLAN     Discussed with patient the risks and benefits of RYGBP . The patient is interested in bariatric surgery and will begin our presurgical process. HYPERTENSION: Blood pressure stable on the above medications.

## 2020-02-27 DIAGNOSIS — E66.01 MORBID OBESITY WITH BMI OF 50.0-59.9, ADULT (HCC): Primary | ICD-10-CM

## 2020-03-02 ENCOUNTER — TELEPHONE (OUTPATIENT)
Dept: SURGERY | Facility: CLINIC | Age: 35
End: 2020-03-02

## 2020-03-02 NOTE — TELEPHONE ENCOUNTER
Per the request of Dr. Chaparro Thomas, called patient and informed her that Per [facility] insurance she has met the criteria for initial consult with Bariatric surgeon. Patient is registered for Bariatric Seminar on 3/9/20.  All info was sent to surgeon's office for sche

## 2020-03-18 ENCOUNTER — DOCUMENTATION ONLY (OUTPATIENT)
Dept: SURGERY | Facility: CLINIC | Age: 35
End: 2020-03-18

## 2020-03-18 ENCOUNTER — OFFICE VISIT (OUTPATIENT)
Dept: SURGERY | Facility: CLINIC | Age: 35
End: 2020-03-18

## 2020-03-18 VITALS
SYSTOLIC BLOOD PRESSURE: 139 MMHG | WEIGHT: 234 LBS | HEIGHT: 57.8 IN | HEART RATE: 94 BPM | DIASTOLIC BLOOD PRESSURE: 89 MMHG | BODY MASS INDEX: 49.12 KG/M2

## 2020-03-18 DIAGNOSIS — E78.2 HYPERCHOLESTEROLEMIA WITH HYPERTRIGLYCERIDEMIA: ICD-10-CM

## 2020-03-18 DIAGNOSIS — E11.65 UNCONTROLLED TYPE 2 DIABETES MELLITUS WITH HYPERGLYCEMIA (HCC): Primary | ICD-10-CM

## 2020-03-18 DIAGNOSIS — I10 ESSENTIAL HYPERTENSION: ICD-10-CM

## 2020-03-18 DIAGNOSIS — E66.01 MORBID OBESITY WITH BMI OF 50.0-59.9, ADULT (HCC): ICD-10-CM

## 2020-03-18 NOTE — H&P
[address]  [facility]  Dept: [phone]    3/18/2020  Bariatric New Patient Evaluation    Chief Complaint:  morbid obesity     History of Present Ill education: Not on file      Highest education level: Not on file    Occupational History      Occupation: HR. Tobacco Use      Smoking status: Never Smoker      Smokeless tobacco: Never Used    Substance and Sexual Activity      Alcohol use:  Yes index is 49.25 kg/m².   General: no acute distress, well-nourished  HENT: normocephalic, atraumatic  Lung: breathing comfortably, symmetrical expansion, clear to ausculation bilaterally, no wheezing  Heart: regular rate and rhythm, no murmur detected  Abdom

## 2020-03-18 NOTE — PROGRESS NOTES
Oriented pt to the bariatric program; provided/reviewed bariatric packet of info, time line, referrals, etc; pt agreed and verbalized understanding; Attended seminar on 3/9/2020.

## 2020-04-06 ENCOUNTER — TELEPHONE (OUTPATIENT)
Dept: INTERNAL MEDICINE CLINIC | Facility: CLINIC | Age: 35
End: 2020-04-06

## 2020-04-06 DIAGNOSIS — E11.65 UNCONTROLLED TYPE 2 DIABETES MELLITUS WITH HYPERGLYCEMIA (HCC): Primary | ICD-10-CM

## 2020-04-16 ENCOUNTER — VIRTUAL PHONE E/M (OUTPATIENT)
Dept: SURGERY | Facility: CLINIC | Age: 35
End: 2020-04-16

## 2020-04-16 DIAGNOSIS — E11.65 UNCONTROLLED TYPE 2 DIABETES MELLITUS WITH HYPERGLYCEMIA (HCC): Primary | ICD-10-CM

## 2020-04-16 DIAGNOSIS — E66.01 MORBID OBESITY WITH BMI OF 50.0-59.9, ADULT (HCC): ICD-10-CM

## 2020-04-16 DIAGNOSIS — I10 ESSENTIAL HYPERTENSION: ICD-10-CM

## 2020-04-16 DIAGNOSIS — E78.2 HYPERCHOLESTEROLEMIA WITH HYPERTRIGLYCERIDEMIA: ICD-10-CM

## 2020-04-16 PROCEDURE — 99213 OFFICE O/P EST LOW 20 MIN: CPT | Performed by: INTERNAL MEDICINE

## 2020-04-16 NOTE — PROGRESS NOTES
Virtual Telephone Check-In    Phi Dotson verbally consents to a Virtual/Telephone Check-In visit on 04/16/20. Patient understands and accepts financial responsibility for any deductible, co-insurance and/or co-pays associated with this service.     Lili

## 2020-04-29 ENCOUNTER — TELEMEDICINE (OUTPATIENT)
Dept: SURGERY | Facility: CLINIC | Age: 35
End: 2020-04-29

## 2020-04-29 VITALS — BODY MASS INDEX: 49.33 KG/M2 | HEIGHT: 57.8 IN | WEIGHT: 235 LBS

## 2020-04-29 DIAGNOSIS — E66.01 MORBID OBESITY WITH BMI OF 45.0-49.9, ADULT (HCC): Primary | ICD-10-CM

## 2020-04-29 DIAGNOSIS — E11.65 UNCONTROLLED TYPE 2 DIABETES MELLITUS WITH HYPERGLYCEMIA (HCC): ICD-10-CM

## 2020-04-29 PROCEDURE — 97802 MEDICAL NUTRITION INDIV IN: CPT | Performed by: DIETITIAN, REGISTERED

## 2020-04-29 NOTE — PROGRESS NOTES
[facility]  [address] [place] [id number]  Dept: [phone]  Loc: [phone]    04/29/20    Bariatric Initial Nutrition Assessment    Carlota Burt is a 27year old female.     Referring behavior: Feels disgusted, depressed, or guilty after overeating. and Eats to escape. Loses track of time or is unaware of the binge.     Patient engages in binge-purge behavior: no    Patient uses laxatives or vomits as a means of purging: no    Patient co 05/31/2019    LDL 55 05/31/2019    VLDL 15 05/31/2019    [place] 70 05/31/2019       Vitamins/Minerals:  No results found for: B12, VITB12  No results found for: VITD, QVITD, VITD25, SLIJ58SF  No results found for: THIAMINE   No results found for: VITB1  N AM Snack       Lunch     PM Snack     Dinner Evening Snack   2 scr eggs, 1 sausage kt, 1 slice wheat toast, 1 tsp butter, 8 ozs coffee, 1 tbs half and half, 1 Splenda  2 ozs chicken vasquez, 1 tbs white rice, water  Chicken vegetable soup (2 ozs chic of care  · F/U to reinforce goals  · F/U on vitamin/mineral supplementation  · Review quizzes  ·  for liquid protein diet prior to surgery  · Other:  RTC 1 mo    Additional RD visits required to review concepts?  Yes  Patient understands protein requ

## 2020-04-30 ENCOUNTER — TELEPHONE (OUTPATIENT)
Dept: INTERNAL MEDICINE CLINIC | Facility: CLINIC | Age: 35
End: 2020-04-30

## 2020-04-30 ENCOUNTER — TELEPHONE (OUTPATIENT)
Dept: FAMILY MEDICINE CLINIC | Facility: CLINIC | Age: 35
End: 2020-04-30

## 2020-04-30 DIAGNOSIS — I10 HYPERTENSION, UNSPECIFIED TYPE: Primary | ICD-10-CM

## 2020-04-30 NOTE — TELEPHONE ENCOUNTER
Dr. Ines Yang, Dr. Crescencio Moody office left a vm requesting a referral for pt's upcoming appointment on Monday, May 4. Please sign referral if you agree.  Thank you, [facility]

## 2020-05-01 ENCOUNTER — LAB ENCOUNTER (OUTPATIENT)
Dept: LAB | Facility: HOSPITAL | Age: 35
End: 2020-05-01
Attending: SINGLE SPECIALTY
Payer: COMMERCIAL

## 2020-05-01 DIAGNOSIS — E11.65 UNCONTROLLED TYPE 2 DIABETES MELLITUS WITH HYPERGLYCEMIA (HCC): ICD-10-CM

## 2020-05-01 DIAGNOSIS — E78.2 HYPERCHOLESTEROLEMIA WITH HYPERTRIGLYCERIDEMIA: ICD-10-CM

## 2020-05-01 DIAGNOSIS — E66.01 MORBID OBESITY WITH BMI OF 50.0-59.9, ADULT (HCC): ICD-10-CM

## 2020-05-01 DIAGNOSIS — I10 ESSENTIAL HYPERTENSION: ICD-10-CM

## 2020-05-01 PROCEDURE — 85025 COMPLETE CBC W/AUTO DIFF WBC: CPT

## 2020-05-01 PROCEDURE — 83540 ASSAY OF IRON: CPT

## 2020-05-01 PROCEDURE — 82607 VITAMIN B-12: CPT

## 2020-05-01 PROCEDURE — 82746 ASSAY OF FOLIC ACID SERUM: CPT

## 2020-05-01 PROCEDURE — 84466 ASSAY OF TRANSFERRIN: CPT

## 2020-05-01 PROCEDURE — 84100 ASSAY OF PHOSPHORUS: CPT

## 2020-05-01 PROCEDURE — 36415 COLL VENOUS BLD VENIPUNCTURE: CPT

## 2020-05-01 PROCEDURE — 80061 LIPID PANEL: CPT

## 2020-05-01 PROCEDURE — 83735 ASSAY OF MAGNESIUM: CPT

## 2020-05-01 PROCEDURE — 80053 COMPREHEN METABOLIC PANEL: CPT

## 2020-05-01 PROCEDURE — 84425 ASSAY OF VITAMIN B-1: CPT

## 2020-05-01 PROCEDURE — 82306 VITAMIN D 25 HYDROXY: CPT

## 2020-05-01 PROCEDURE — 82728 ASSAY OF FERRITIN: CPT

## 2020-05-01 PROCEDURE — 83036 HEMOGLOBIN GLYCOSYLATED A1C: CPT

## 2020-05-01 PROCEDURE — 84443 ASSAY THYROID STIM HORMONE: CPT

## 2020-06-05 ENCOUNTER — TELEPHONE (OUTPATIENT)
Dept: SURGERY | Facility: CLINIC | Age: 35
End: 2020-06-05

## 2020-06-05 DIAGNOSIS — E66.01 MORBID OBESITY WITH BMI OF 50.0-59.9, ADULT (HCC): Primary | ICD-10-CM

## 2020-06-08 ENCOUNTER — VIRTUAL PHONE E/M (OUTPATIENT)
Dept: SURGERY | Facility: CLINIC | Age: 35
End: 2020-06-08

## 2020-06-08 VITALS — HEIGHT: 58 IN | BODY MASS INDEX: 48.91 KG/M2 | WEIGHT: 233 LBS

## 2020-06-08 DIAGNOSIS — E66.01 MORBID OBESITY WITH BMI OF 50.0-59.9, ADULT (HCC): ICD-10-CM

## 2020-06-08 DIAGNOSIS — E11.65 UNCONTROLLED TYPE 2 DIABETES MELLITUS WITH HYPERGLYCEMIA (HCC): ICD-10-CM

## 2020-06-08 DIAGNOSIS — E78.2 HYPERCHOLESTEROLEMIA WITH HYPERTRIGLYCERIDEMIA: Primary | ICD-10-CM

## 2020-06-08 DIAGNOSIS — I10 ESSENTIAL HYPERTENSION: ICD-10-CM

## 2020-06-08 PROCEDURE — 99213 OFFICE O/P EST LOW 20 MIN: CPT | Performed by: INTERNAL MEDICINE

## 2020-06-08 NOTE — PROGRESS NOTES
[address] [facility] [address]  [facility]  Dept: [phone]       Patient:  Oswald Dobson  :      1985  MRN:      [id number]    Chief Complaint:  Patient presents w Patient is insulin dependent 100 each 3   • ondansetron 4 MG Oral Tablet Dispersible Take 1 tablet (4 mg total) by mouth every 8 (eight) hours as needed for Nausea. 30 tablet 0     Allergies:  Patient has no known allergies.      Social History:  Social His • Other (EToHism. ) Maternal Grandmother    • Diabetes Paternal Grandmother    • Hypertension Paternal Grandmother    • Lipids Paternal Grandmother    • Renal Disease Paternal Grandmother    • Diabetes Paternal Grandfather         Type 1, IDDM with retin sentences      ASSESSMENT     HYPERTENSION:  The patient's blood pressure has been well controlled. she has been checking it as instructed and has remained in relatively good control.       HYPERCHOLESTEROLEMIA:  The patient states that her cholesterol has with psych  Scheduled to see pulmonary    Blood work done and reviewed  Taking vitamin D supplements  Aware A1C should be below 8    Will go over pre and post op meds at next visit if she has a surgery date    Diagnoses and all orders for this visit:    Hy

## 2020-06-18 ENCOUNTER — OFFICE VISIT (OUTPATIENT)
Dept: INTERNAL MEDICINE CLINIC | Facility: CLINIC | Age: 35
End: 2020-06-18

## 2020-06-18 ENCOUNTER — OFFICE VISIT (OUTPATIENT)
Dept: SURGERY | Facility: CLINIC | Age: 35
End: 2020-06-18

## 2020-06-18 VITALS
BODY MASS INDEX: 49.75 KG/M2 | WEIGHT: 237 LBS | RESPIRATION RATE: 18 BRPM | TEMPERATURE: 98 F | DIASTOLIC BLOOD PRESSURE: 70 MMHG | HEIGHT: 57.8 IN | OXYGEN SATURATION: 98 % | HEART RATE: 87 BPM | SYSTOLIC BLOOD PRESSURE: 118 MMHG

## 2020-06-18 VITALS — WEIGHT: 236.81 LBS | BODY MASS INDEX: 49.71 KG/M2 | HEIGHT: 57.8 IN

## 2020-06-18 DIAGNOSIS — E11.65 UNCONTROLLED TYPE 2 DIABETES MELLITUS WITH HYPERGLYCEMIA (HCC): ICD-10-CM

## 2020-06-18 DIAGNOSIS — E66.01 CLASS 3 SEVERE OBESITY DUE TO EXCESS CALORIES WITH SERIOUS COMORBIDITY AND BODY MASS INDEX (BMI) OF 50.0 TO 59.9 IN ADULT (HCC): Primary | ICD-10-CM

## 2020-06-18 DIAGNOSIS — E78.00 HIGH CHOLESTEROL: ICD-10-CM

## 2020-06-18 DIAGNOSIS — I10 ESSENTIAL HYPERTENSION: Primary | ICD-10-CM

## 2020-06-18 PROBLEM — L81.1 MELASMA: Status: ACTIVE | Noted: 2020-06-18

## 2020-06-18 PROCEDURE — 99213 OFFICE O/P EST LOW 20 MIN: CPT | Performed by: NURSE PRACTITIONER

## 2020-06-18 PROCEDURE — 97803 MED NUTRITION INDIV SUBSEQ: CPT | Performed by: DIETITIAN, REGISTERED

## 2020-06-18 RX ORDER — LISINOPRIL 5 MG/1
1 TABLET ORAL DAILY
COMMUNITY
Start: 2020-05-01 | End: 2020-10-09

## 2020-06-18 RX ORDER — GLYBURIDE 2.5 MG/1
2.5 TABLET ORAL 2 TIMES DAILY WITH MEALS
Qty: 180 TABLET | Refills: 1 | Status: SHIPPED | OUTPATIENT
Start: 2020-06-18 | End: 2020-09-17

## 2020-06-18 RX ORDER — METOPROLOL SUCCINATE 25 MG/1
25 TABLET, EXTENDED RELEASE ORAL DAILY
COMMUNITY
End: 2020-09-17

## 2020-06-18 NOTE — PROGRESS NOTES
HPI:    Patient ID: Lesli Shaikh is a 38year old female. Patient presents with:  Diabetes: 6mos f/u  Cholesterol: 6mos f/u       Patient here for follow up of Diabetes. Has been taking medications regularly.     Checks sugars dexcom  Fasting sugars avera Wt Readings from Last 3 Encounters:  06/18/20 : 237 lb (107.5 kg)  06/18/20 : 236 lb 12.8 oz (107.4 kg)  06/08/20 : 233 lb (105.7 kg)    BP Readings from Last 3 Encounters:  06/18/20 : 118/70  03/18/20 : 139/89  02/13/20 : 140/90    Labs:   Lab Results   C HENT: Negative for congestion, dental problem, ear discharge, ear pain, facial swelling, hearing loss, mouth sores, nosebleeds, postnasal drip, rhinorrhea, sinus pressure, sinus pain, sneezing, sore throat, tinnitus, trouble swallowing and voice change. • atorvastatin 40 MG Oral Tab TAKE 1 TABLET BY MOUTH EVERY NIGHT 90 tablet 1   • Diclofenac Sodium 50 MG Oral Tab EC Take 1 tablet (50 mg total) by mouth 2 (two) times daily.  60 tablet 1   • Metoprolol Succinate ER 25 MG Oral Tablet 24 Hr Take 25 mg by juan m Constitutional: She is oriented to person, place, and time. Vital signs are normal. She appears well-developed and well-nourished. She is active. Non-toxic appearance. She does not have a sickly appearance. She does not appear ill. No distress.    HENT: Abdominal: Soft. Bowel sounds are normal. She exhibits no distension and no mass. There is no tenderness. There is no rebound and no guarding. Musculoskeletal: Normal range of motion. General: No deformity or edema.    Lymphadenopathy:        Head Careful with diet and excercise at least 30 minutes 3-4 times a week. Check sugars at different times on different dates. Careful with low sugars. Carry something with you and check sugar if can. Can carry dylan cracker, etc. Decrease carbohydrates.  But a

## 2020-06-18 NOTE — PATIENT INSTRUCTIONS
ASSESSMENT/PLAN:   Essential hypertension  (primary encounter diagnosis)   Careful with diet and excercise at least 30 minutes 3-4 times a week. Check blood pressures at different times on different days. Can purchase own blood pressure monitor.  If not

## 2020-06-18 NOTE — PROGRESS NOTES
[facility] AND [facility] CLINIC  [address] [place] [address]  Dept: [phone]  Loc: [phone]    06/18/20      Bariatric Follow-up Nutrition Session    Elvis Delgado is a 38year old female.      Assessme Results   Component Value Date    VITD 25.2 (L) 05/01/2020     Lab Results   Component Value Date/Time    THIAMINE 144 05/01/2020 08:21 AM      No results found for: VITB1  Lab Results   Component Value Date/Time    FOLIC 58.9 33/40/4614 12:60 AM        Me shake or 2 scrambled eggs, 1 sausage kt, 1 slice wheat toast, 1 tsp butter, 8 ozs decaf coffee, 1 tbs half and half, 1 Splenda  2 ozs chicken vasquez, 1 tbs white rice, water  Chicken vegetable soup (2 ozs chicken, carrots, potatoes)                +Switc intake and Other:  pre-op labs, A1c  Additional RD visits required to review concepts? Yes, and to monitor goals  Patient understands protein requirements? Reinforced  Patient understand fluid requirements (amount and method of intake)?  Yes  Patient unders

## 2020-06-23 ENCOUNTER — OFFICE VISIT (OUTPATIENT)
Dept: PULMONOLOGY | Facility: CLINIC | Age: 35
End: 2020-06-23

## 2020-06-23 ENCOUNTER — HOSPITAL ENCOUNTER (OUTPATIENT)
Dept: GENERAL RADIOLOGY | Facility: HOSPITAL | Age: 35
Discharge: HOME | End: 2020-06-23
Attending: INTERNAL MEDICINE
Payer: COMMERCIAL

## 2020-06-23 ENCOUNTER — LAB ENCOUNTER (OUTPATIENT)
Dept: LAB | Facility: HOSPITAL | Age: 35
End: 2020-06-23
Attending: INTERNAL MEDICINE
Payer: COMMERCIAL

## 2020-06-23 VITALS
HEART RATE: 82 BPM | HEIGHT: 58 IN | BODY MASS INDEX: 49.54 KG/M2 | OXYGEN SATURATION: 100 % | SYSTOLIC BLOOD PRESSURE: 110 MMHG | WEIGHT: 236 LBS | DIASTOLIC BLOOD PRESSURE: 75 MMHG | RESPIRATION RATE: 18 BRPM

## 2020-06-23 DIAGNOSIS — I10 HTN (HYPERTENSION): Primary | ICD-10-CM

## 2020-06-23 DIAGNOSIS — Z01.818 PREOPERATIVE CLEARANCE: Primary | ICD-10-CM

## 2020-06-23 DIAGNOSIS — Z01.818 PREOPERATIVE CLEARANCE: ICD-10-CM

## 2020-06-23 PROCEDURE — 71046 X-RAY EXAM CHEST 2 VIEWS: CPT | Performed by: INTERNAL MEDICINE

## 2020-06-23 PROCEDURE — 99244 OFF/OP CNSLTJ NEW/EST MOD 40: CPT | Performed by: INTERNAL MEDICINE

## 2020-06-23 PROCEDURE — 80048 BASIC METABOLIC PNL TOTAL CA: CPT

## 2020-06-23 PROCEDURE — 36415 COLL VENOUS BLD VENIPUNCTURE: CPT

## 2020-06-23 NOTE — H&P
Referring Physician  Genesis Stewart MD    Chief Complaint  Preoperative pulmonary clearance    History of Present Illness  Patient is a 39-year-old female who presents for preoperative pulmonary clearance for upcoming bariatric weight loss surgery.   Paula Lara OSEI U/F) 32G X 4 MM Does not apply Misc, Injecting once a day  Diagnosis E11.65  Patient is insulin dependent, Disp: 100 each, Rfl: 3  glyBURIDE 2.5 MG Oral Tab, Take 1 tablet (2.5 mg total) by mouth 2 (two) times daily with meals.  1 tab po qam and 1 tab

## 2020-06-29 ENCOUNTER — TELEPHONE (OUTPATIENT)
Dept: SURGERY | Facility: CLINIC | Age: 35
End: 2020-06-29

## 2020-07-08 ENCOUNTER — OFFICE VISIT (OUTPATIENT)
Dept: SURGERY | Facility: CLINIC | Age: 35
End: 2020-07-08

## 2020-07-08 VITALS
BODY MASS INDEX: 49.41 KG/M2 | OXYGEN SATURATION: 99 % | HEIGHT: 57.8 IN | WEIGHT: 235.38 LBS | SYSTOLIC BLOOD PRESSURE: 122 MMHG | DIASTOLIC BLOOD PRESSURE: 80 MMHG | HEART RATE: 86 BPM

## 2020-07-08 RX ORDER — ERGOCALCIFEROL 1.25 MG/1
50000 CAPSULE ORAL WEEKLY
Qty: 4 CAPSULE | Refills: 2 | Status: SHIPPED | OUTPATIENT
Start: 2020-07-08 | End: 2020-08-07

## 2020-07-08 NOTE — PROGRESS NOTES
[address], [address].  [facility]  Dept: [phone]    7/8/2020   Bariatric Patient Follow-up Evaluation    Chief Complaint:  morbid obesity     History of Prese Disp: 4 capsule, Rfl: 2  •  lisinopril 5 MG Oral Tab, Take 1 mg by mouth daily. , Disp: , Rfl:   •  Metoprolol Succinate ER 25 MG Oral Tablet 24 Hr, Take 25 mg by mouth daily. , Disp: , Rfl:   •  metFORMIN HCl 500 MG Oral Tab, 1 po q12 hrs., Disp: 180 tablet blood glucose sensing device in place  Extremities: normal strength, normal ROM, walks without assist device  Neuro: no focal deficits, cranial nerves grossly intact  Psych: alert and oriented, normal affect  Skin: warm, dry    Assessment: 38year old fema

## 2020-07-11 ENCOUNTER — TELEPHONE (OUTPATIENT)
Dept: INTERNAL MEDICINE CLINIC | Facility: CLINIC | Age: 35
End: 2020-07-11

## 2020-07-11 DIAGNOSIS — E11.65 UNCONTROLLED TYPE 2 DIABETES MELLITUS WITH HYPERGLYCEMIA (HCC): Primary | ICD-10-CM

## 2020-07-13 ENCOUNTER — TELEPHONE (OUTPATIENT)
Dept: INTERNAL MEDICINE CLINIC | Facility: CLINIC | Age: 35
End: 2020-07-13

## 2020-07-13 DIAGNOSIS — I10 ESSENTIAL HYPERTENSION: Primary | ICD-10-CM

## 2020-07-13 NOTE — TELEPHONE ENCOUNTER
In order. Cardiology does not need to see her specifically for blood pressure elevations. But if she wants that is fine.

## 2020-07-13 NOTE — TELEPHONE ENCOUNTER
Dr. Germaine Hall, patient is requesting a referral to Dr. Raul Clark. Referral has been pended, please advise.

## 2020-07-13 NOTE — TELEPHONE ENCOUNTER
Madina from Dr. Devaughn Diane clinic is requesting a referral. Patient has a follow up  appointment scheduled for today. Requesting 3 visits if possible.      Please fax referral to [phone]

## 2020-07-20 ENCOUNTER — TELEPHONE (OUTPATIENT)
Dept: INTERNAL MEDICINE CLINIC | Facility: CLINIC | Age: 35
End: 2020-07-20

## 2020-07-20 NOTE — TELEPHONE ENCOUNTER
Pallavi from [facility] is following up for the DME referral. She is requesting a call back.

## 2020-07-23 ENCOUNTER — OFFICE VISIT (OUTPATIENT)
Dept: SURGERY | Facility: CLINIC | Age: 35
End: 2020-07-23

## 2020-07-23 VITALS — WEIGHT: 239.5 LBS | BODY MASS INDEX: 50.28 KG/M2 | HEIGHT: 57.8 IN

## 2020-07-23 DIAGNOSIS — E66.01 CLASS 3 SEVERE OBESITY DUE TO EXCESS CALORIES WITH SERIOUS COMORBIDITY AND BODY MASS INDEX (BMI) OF 50.0 TO 59.9 IN ADULT (HCC): Primary | ICD-10-CM

## 2020-07-23 PROCEDURE — 97803 MED NUTRITION INDIV SUBSEQ: CPT | Performed by: DIETITIAN, REGISTERED

## 2020-07-23 PROCEDURE — 0358T BIA WHOLE BODY: CPT | Performed by: DIETITIAN, REGISTERED

## 2020-07-23 PROCEDURE — 3008F BODY MASS INDEX DOCD: CPT | Performed by: DIETITIAN, REGISTERED

## 2020-07-23 NOTE — PROGRESS NOTES
[address] [place] [address] AND WEIGHT LOSS CLINIC  [address] [place] [address]  Dept: [phone]  Loc: [phone]    07/23/20      Bariatric Follow-up Nutrition Session    Rhea Wardriley is a 38year old female.      Assessme Results   Component Value Date    VITD 25.2 (L) 05/01/2020     Lab Results   Component Value Date/Time    THIAMINE 144 05/01/2020 08:21 AM      No results found for: VITB1  Lab Results   Component Value Date/Time    FOLIC 36.8 09/25/9185 10:60 AM        Me Breakfast      AM Snack       Lunch     PM Snack     Dinner   1 cup khushboo seed pudding + 1 cup blackberries  4 oz chicken + lettuce + giardiniera + oil and vinegar Premier shake 2 cups lettuce + 4 oz gorund beef + 1 oz cheddar + mixed veggies     +Switc exercise    Monitor/Evaluate     Anthropometric measurements, Food/fluid intake/choices, Food intolerances, Activity level, Vitamin/mineral supplementation, Reinforce goals, Calorie/protein intake and Other:  pre-op labs, A1c  Additional RD visits required

## 2020-07-27 ENCOUNTER — PATIENT MESSAGE (OUTPATIENT)
Dept: INTERNAL MEDICINE CLINIC | Facility: CLINIC | Age: 35
End: 2020-07-27

## 2020-07-28 NOTE — TELEPHONE ENCOUNTER
Spoke to patient to see if she can tell dexcom supplies to send the form to our fax, Provided her with our fax number. Patient will call them to fax form to us.

## 2020-07-29 NOTE — TELEPHONE ENCOUNTER
Aletha Pak from [facility] would with to know the status of the DME (Dexcom) order. Please fax it to #[phone]    Thank you.

## 2020-08-03 ENCOUNTER — TELEPHONE (OUTPATIENT)
Dept: INTERNAL MEDICINE CLINIC | Facility: CLINIC | Age: 35
End: 2020-08-03

## 2020-08-03 NOTE — TELEPHONE ENCOUNTER
Order for Dexcom (DME) was faxed to eHydi Inman from [facility].  Fax # [phone]

## 2020-08-03 NOTE — TELEPHONE ENCOUNTER
DME order for Dexcom was faxed to [facility] from [facility], faxx # [phone].

## 2020-08-07 ENCOUNTER — OFFICE VISIT (OUTPATIENT)
Dept: SURGERY | Facility: CLINIC | Age: 35
End: 2020-08-07

## 2020-08-07 VITALS
BODY MASS INDEX: 50.27 KG/M2 | WEIGHT: 233 LBS | SYSTOLIC BLOOD PRESSURE: 101 MMHG | HEIGHT: 57.2 IN | DIASTOLIC BLOOD PRESSURE: 70 MMHG

## 2020-08-07 DIAGNOSIS — E78.2 HYPERCHOLESTEROLEMIA WITH HYPERTRIGLYCERIDEMIA: Primary | ICD-10-CM

## 2020-08-07 DIAGNOSIS — E66.01 MORBID OBESITY WITH BMI OF 50.0-59.9, ADULT (HCC): ICD-10-CM

## 2020-08-07 DIAGNOSIS — I10 ESSENTIAL HYPERTENSION: ICD-10-CM

## 2020-08-07 DIAGNOSIS — E11.65 UNCONTROLLED TYPE 2 DIABETES MELLITUS WITH HYPERGLYCEMIA (HCC): ICD-10-CM

## 2020-08-07 PROCEDURE — 3078F DIAST BP <80 MM HG: CPT | Performed by: INTERNAL MEDICINE

## 2020-08-07 PROCEDURE — 99214 OFFICE O/P EST MOD 30 MIN: CPT | Performed by: INTERNAL MEDICINE

## 2020-08-07 PROCEDURE — 3074F SYST BP LT 130 MM HG: CPT | Performed by: INTERNAL MEDICINE

## 2020-08-07 PROCEDURE — 3008F BODY MASS INDEX DOCD: CPT | Performed by: INTERNAL MEDICINE

## 2020-08-07 NOTE — PATIENT INSTRUCTIONS
Outpatient Encounter Medications as of 8/7/2020   Medication Sig Note   • [DISCONTINUED] ergocalciferol 1.25 MG (66400 UT) Oral Cap Take 1 capsule (50,000 Units total) by mouth once a week. • lisinopril 5 MG Oral Tab Take 1 mg by mouth daily.  8/7/2020:

## 2020-08-07 NOTE — PROGRESS NOTES
[address]  Dept: [phone]       Patient:  Elvis Delgado  :      1985  MRN:      [id number]    Chief Complaint:  Patient presents w Sodium 50 MG Oral Tab EC Take 1 tablet (50 mg total) by mouth 2 (two) times daily. (Patient taking differently: Take 50 mg by mouth as needed.  ) 60 tablet 1     Allergies:  Patient has no known allergies.      Social History:  Social History    Socioeconom Maternal Grandmother    • Diabetes Paternal Grandmother    • Hypertension Paternal Grandmother    • Lipids Paternal Grandmother    • Renal Disease Paternal Grandmother    • Diabetes Paternal Grandfather         Type 1, IDDM with retinoapthy.     • Stroke Pa Normocephalic, without obvious abnormality, atraumatic  Eyes: conjunctivae/corneas clear. PERRL, EOM's intact. Fundi benign.   Neck: no adenopathy, no carotid bruit, no JVD, supple, symmetrical, trachea midline and thyroid not enlarged, symmetric, no tender medication. DYSLIPIDEMIA: Stable on the above prescribed meal plan and medication. Liver function stable.     Lab Results   Component Value Date/Time    CHOLEST 148 05/01/2020 08:21 AM    LDL 68 05/01/2020 08:21 AM    HDL 53 05/01/2020 08:21 AM    TRIG MOUTH EVERY NIGHT 8/7/2020: Discontinue two weeks before bariatric surgery   • Diclofenac Sodium 50 MG Oral Tab EC Take 1 tablet (50 mg total) by mouth 2 (two) times daily.  (Patient taking differently: Take 50 mg by mouth as needed.  ) 8/7/2020: Discontinu

## 2020-08-19 ENCOUNTER — OFFICE VISIT (OUTPATIENT)
Dept: SURGERY | Facility: CLINIC | Age: 35
End: 2020-08-19

## 2020-08-19 VITALS — HEIGHT: 58 IN | WEIGHT: 235 LBS | BODY MASS INDEX: 49.33 KG/M2

## 2020-08-19 VITALS
BODY MASS INDEX: 49.33 KG/M2 | HEIGHT: 57.8 IN | WEIGHT: 235 LBS | HEART RATE: 94 BPM | SYSTOLIC BLOOD PRESSURE: 118 MMHG | OXYGEN SATURATION: 100 % | DIASTOLIC BLOOD PRESSURE: 68 MMHG

## 2020-08-19 DIAGNOSIS — E66.01 CLASS 3 SEVERE OBESITY DUE TO EXCESS CALORIES WITH SERIOUS COMORBIDITY AND BODY MASS INDEX (BMI) OF 45.0 TO 49.9 IN ADULT (HCC): ICD-10-CM

## 2020-08-19 DIAGNOSIS — E11.65 UNCONTROLLED TYPE 2 DIABETES MELLITUS WITH HYPERGLYCEMIA (HCC): Primary | ICD-10-CM

## 2020-08-19 DIAGNOSIS — E66.01 CLASS 3 SEVERE OBESITY DUE TO EXCESS CALORIES WITH SERIOUS COMORBIDITY AND BODY MASS INDEX (BMI) OF 45.0 TO 49.9 IN ADULT (HCC): Primary | ICD-10-CM

## 2020-08-19 PROCEDURE — 97803 MED NUTRITION INDIV SUBSEQ: CPT | Performed by: DIETITIAN, REGISTERED

## 2020-08-19 PROCEDURE — 3008F BODY MASS INDEX DOCD: CPT | Performed by: DIETITIAN, REGISTERED

## 2020-08-19 NOTE — PROGRESS NOTES
[address]  [address] [address]  Dept: [phone]  Loc: [phone]    08/19/20    Bariatric Liquid Protein Nutrition Session    Rikki Bates is a 38year old female    Assessm HGBA1C:    Lab Results   Component Value Date    A1C 8.2 (H) 05/01/2020    A1C 6.0 (H) 05/31/2019    A1C 10.2 (H) 01/22/2019     (H) 05/01/2020       Lipid Panel:  Lab Results   Component Value Date    CHOLEST 148 05/01/2020    TRIG 135 05/01/2020 + 1 serving pork rinds  8 oz chicken tenderloin + nopales + cilantro + onion + 1/4 cup guacamole  1/2 cucumber + 2 oz nuts     +eliminated caffeine and carbonation    Total Kcal: ~1200 calories/day  Protein intake: 97 grams/day, 58g CHO per day  Fluid [place] Anthropometric measurements, Food/fluid intake/choices, Food intolerances, Activity level, Vitamin/mineral supplementation, Reinforce goals, Calorie/protein intake and Other:  f/u 2-4 weeks post-op  Patient understands protein requirements?  Yes  Patien

## 2020-08-19 NOTE — PROGRESS NOTES
[address] , [address].  [address] [facility] [address]  Dept: [phone]    8/19/2020   Bariatric Patient Follow-up Evaluation    Chief Complaint:  morbid obesity     History of Pres MG Oral Tablet 24 Hr, Take 25 mg by mouth daily. , Disp: , Rfl:   •  metFORMIN HCl 500 MG Oral Tab, 1 po q12 hrs., Disp: 180 tablet, Rfl: 1  •  insulin detemir (LEVEMIR FLEXTOUCH) 100 UNIT/ML Subcutaneous Solution Pen-injector, INJECT 20 UNITS UNDER THE SKI and oriented, normal affect  Skin: warm, dry, she has a blood glucose sensing device in place right arm    Assessment: 38year old female with chronic morbid obesity who would benefit from significant and sustained weight loss    Dietitian - last visit 7/2 2  Smoking history - 2  OR time > 3 hours - 2  Prior history of VTE - 5    Total Score  7    0-14 --> Low risk --> standard prophylaxis  15-19 --> Medium risk --> protocol for postop chemoprophylaxis  20 or greater --> high risk --> consider therapeutic ch

## 2020-09-11 ENCOUNTER — LAB ENCOUNTER (OUTPATIENT)
Dept: LAB | Age: 35
End: 2020-09-11
Attending: SINGLE SPECIALTY
Payer: COMMERCIAL

## 2020-09-11 ENCOUNTER — TELEPHONE (OUTPATIENT)
Dept: INTERNAL MEDICINE CLINIC | Facility: CLINIC | Age: 35
End: 2020-09-11

## 2020-09-11 DIAGNOSIS — Z01.818 PRE-OP TESTING: ICD-10-CM

## 2020-09-11 LAB
ANTIBODY SCREEN: NEGATIVE
RH BLOOD TYPE: POSITIVE

## 2020-09-11 PROCEDURE — 86850 RBC ANTIBODY SCREEN: CPT

## 2020-09-11 PROCEDURE — 36415 COLL VENOUS BLD VENIPUNCTURE: CPT

## 2020-09-11 PROCEDURE — 86900 BLOOD TYPING SEROLOGIC ABO: CPT

## 2020-09-11 PROCEDURE — 86901 BLOOD TYPING SEROLOGIC RH(D): CPT

## 2020-09-11 NOTE — PAT NURSING NOTE
Priyanka Mcmullen from Dr. Burt Mixon office will call for EKG from cardiology and fax to PAT office when obtained.

## 2020-09-11 NOTE — TELEPHONE ENCOUNTER
[facility] pre admission testing call to request a copy of patient's EKG faxed to them for her surgery.     Fax # [phone]

## 2020-09-11 NOTE — TELEPHONE ENCOUNTER
Spoke to pre-admission to inform them that this pt has no record of EKG being done. No further action needed. smile, cheek puff, eyebrow raise: symmetrical. tongue protrusion: midline. visual tracking (H test): smooth pursuit. Visual field test (quadrant test): WNL B/L. No DDK noted. No sway or LOB w/ romberg

## 2020-09-12 LAB — SARS-COV-2 RNA RESP QL NAA+PROBE: NOT DETECTED

## 2020-09-14 ENCOUNTER — ANESTHESIA EVENT (OUTPATIENT)
Dept: SURGERY | Facility: HOSPITAL | Age: 35
DRG: 621 | End: 2020-09-14
Payer: COMMERCIAL

## 2020-09-14 ENCOUNTER — HOSPITAL ENCOUNTER (INPATIENT)
Facility: HOSPITAL | Age: 35
LOS: 1 days | Discharge: HOME | DRG: 621 | End: 2020-09-15
Attending: SINGLE SPECIALTY | Admitting: SINGLE SPECIALTY
Payer: COMMERCIAL

## 2020-09-14 ENCOUNTER — ANESTHESIA (OUTPATIENT)
Dept: SURGERY | Facility: HOSPITAL | Age: 35
DRG: 621 | End: 2020-09-14
Payer: COMMERCIAL

## 2020-09-14 DIAGNOSIS — Z01.818 PRE-OP TESTING: Primary | ICD-10-CM

## 2020-09-14 DIAGNOSIS — E66.01 CLASS 3 SEVERE OBESITY DUE TO EXCESS CALORIES WITH SERIOUS COMORBIDITY AND BODY MASS INDEX (BMI) OF 45.0 TO 49.9 IN ADULT (HCC): ICD-10-CM

## 2020-09-14 DIAGNOSIS — I10 ESSENTIAL HYPERTENSION: ICD-10-CM

## 2020-09-14 DIAGNOSIS — E11.65 UNCONTROLLED TYPE 2 DIABETES MELLITUS WITH HYPERGLYCEMIA (HCC): ICD-10-CM

## 2020-09-14 DIAGNOSIS — E78.2 HYPERCHOLESTEROLEMIA WITH HYPERTRIGLYCERIDEMIA: ICD-10-CM

## 2020-09-14 PROBLEM — E11.9 DIABETES MELLITUS, TYPE 2 (HCC): Chronic | Status: ACTIVE | Noted: 2020-09-14

## 2020-09-14 LAB
B-HCG UR QL: NEGATIVE
GLUCOSE BLDC GLUCOMTR-MCNC: 171 MG/DL (ref 70–99)
GLUCOSE BLDC GLUCOMTR-MCNC: 224 MG/DL (ref 70–99)
GLUCOSE BLDC GLUCOMTR-MCNC: 242 MG/DL (ref 70–99)
GLUCOSE BLDC GLUCOMTR-MCNC: 244 MG/DL (ref 70–99)

## 2020-09-14 PROCEDURE — 0DJ08ZZ INSPECTION OF UPPER INTESTINAL TRACT, VIA NATURAL OR ARTIFICIAL OPENING ENDOSCOPIC: ICD-10-PCS | Performed by: SINGLE SPECIALTY

## 2020-09-14 PROCEDURE — 0D164ZA BYPASS STOMACH TO JEJUNUM, PERCUTANEOUS ENDOSCOPIC APPROACH: ICD-10-PCS | Performed by: SINGLE SPECIALTY

## 2020-09-14 PROCEDURE — 3E0T3BZ INTRODUCTION OF ANESTHETIC AGENT INTO PERIPHERAL NERVES AND PLEXI, PERCUTANEOUS APPROACH: ICD-10-PCS | Performed by: SINGLE SPECIALTY

## 2020-09-14 PROCEDURE — 99232 SBSQ HOSP IP/OBS MODERATE 35: CPT | Performed by: HOSPITALIST

## 2020-09-14 RX ORDER — FAMOTIDINE 20 MG/1
20 TABLET ORAL ONCE
Status: COMPLETED | OUTPATIENT
Start: 2020-09-14 | End: 2020-09-14

## 2020-09-14 RX ORDER — MORPHINE SULFATE 4 MG/ML
4 INJECTION, SOLUTION INTRAMUSCULAR; INTRAVENOUS EVERY 2 HOUR PRN
Status: DISCONTINUED | OUTPATIENT
Start: 2020-09-14 | End: 2020-09-15

## 2020-09-14 RX ORDER — HYDROCODONE BITARTRATE AND ACETAMINOPHEN 5; 325 MG/1; MG/1
2 TABLET ORAL AS NEEDED
Status: DISCONTINUED | OUTPATIENT
Start: 2020-09-14 | End: 2020-09-14 | Stop reason: HOSPADM

## 2020-09-14 RX ORDER — CELECOXIB 200 MG/1
400 CAPSULE ORAL ONCE
Status: COMPLETED | OUTPATIENT
Start: 2020-09-14 | End: 2020-09-14

## 2020-09-14 RX ORDER — HEPARIN SODIUM 5000 [USP'U]/ML
5000 INJECTION, SOLUTION INTRAVENOUS; SUBCUTANEOUS ONCE
Status: COMPLETED | OUTPATIENT
Start: 2020-09-14 | End: 2020-09-14

## 2020-09-14 RX ORDER — CEFAZOLIN SODIUM/WATER 2 G/20 ML
2 SYRINGE (ML) INTRAVENOUS ONCE
Status: COMPLETED | OUTPATIENT
Start: 2020-09-14 | End: 2020-09-14

## 2020-09-14 RX ORDER — HYDROMORPHONE HYDROCHLORIDE 1 MG/ML
0.2 INJECTION, SOLUTION INTRAMUSCULAR; INTRAVENOUS; SUBCUTANEOUS EVERY 5 MIN PRN
Status: DISCONTINUED | OUTPATIENT
Start: 2020-09-14 | End: 2020-09-14 | Stop reason: HOSPADM

## 2020-09-14 RX ORDER — HYDROMORPHONE HYDROCHLORIDE 1 MG/ML
0.4 INJECTION, SOLUTION INTRAMUSCULAR; INTRAVENOUS; SUBCUTANEOUS EVERY 5 MIN PRN
Status: DISCONTINUED | OUTPATIENT
Start: 2020-09-14 | End: 2020-09-14 | Stop reason: HOSPADM

## 2020-09-14 RX ORDER — KETOROLAC TROMETHAMINE 30 MG/ML
30 INJECTION, SOLUTION INTRAMUSCULAR; INTRAVENOUS EVERY 6 HOURS
Status: DISCONTINUED | OUTPATIENT
Start: 2020-09-14 | End: 2020-09-15

## 2020-09-14 RX ORDER — BUPIVACAINE HYDROCHLORIDE AND EPINEPHRINE 2.5; 5 MG/ML; UG/ML
INJECTION, SOLUTION INFILTRATION; PERINEURAL AS NEEDED
Status: DISCONTINUED | OUTPATIENT
Start: 2020-09-14 | End: 2020-09-14 | Stop reason: HOSPADM

## 2020-09-14 RX ORDER — ACETAMINOPHEN 500 MG
1000 TABLET ORAL ONCE
Status: DISCONTINUED | OUTPATIENT
Start: 2020-09-14 | End: 2020-09-14 | Stop reason: HOSPADM

## 2020-09-14 RX ORDER — MORPHINE SULFATE 2 MG/ML
2 INJECTION, SOLUTION INTRAMUSCULAR; INTRAVENOUS EVERY 2 HOUR PRN
Status: DISCONTINUED | OUTPATIENT
Start: 2020-09-14 | End: 2020-09-15

## 2020-09-14 RX ORDER — ONDANSETRON 2 MG/ML
4 INJECTION INTRAMUSCULAR; INTRAVENOUS ONCE AS NEEDED
Status: COMPLETED | OUTPATIENT
Start: 2020-09-14 | End: 2020-09-14

## 2020-09-14 RX ORDER — MORPHINE SULFATE 2 MG/ML
1 INJECTION, SOLUTION INTRAMUSCULAR; INTRAVENOUS EVERY 2 HOUR PRN
Status: DISCONTINUED | OUTPATIENT
Start: 2020-09-14 | End: 2020-09-15

## 2020-09-14 RX ORDER — ACETAMINOPHEN 500 MG
1000 TABLET ORAL ONCE
Status: COMPLETED | OUTPATIENT
Start: 2020-09-14 | End: 2020-09-14

## 2020-09-14 RX ORDER — MORPHINE SULFATE 4 MG/ML
2 INJECTION, SOLUTION INTRAMUSCULAR; INTRAVENOUS EVERY 10 MIN PRN
Status: DISCONTINUED | OUTPATIENT
Start: 2020-09-14 | End: 2020-09-14 | Stop reason: HOSPADM

## 2020-09-14 RX ORDER — ACETAMINOPHEN 10 MG/ML
1000 INJECTION, SOLUTION INTRAVENOUS EVERY 6 HOURS
Status: DISCONTINUED | OUTPATIENT
Start: 2020-09-14 | End: 2020-09-15

## 2020-09-14 RX ORDER — METOPROLOL TARTRATE 5 MG/5ML
2.5 INJECTION INTRAVENOUS ONCE
Status: DISCONTINUED | OUTPATIENT
Start: 2020-09-14 | End: 2020-09-14 | Stop reason: HOSPADM

## 2020-09-14 RX ORDER — DEXTROSE MONOHYDRATE 25 G/50ML
50 INJECTION, SOLUTION INTRAVENOUS
Status: DISCONTINUED | OUTPATIENT
Start: 2020-09-14 | End: 2020-09-15

## 2020-09-14 RX ORDER — ROCURONIUM BROMIDE 10 MG/ML
INJECTION, SOLUTION INTRAVENOUS AS NEEDED
Status: DISCONTINUED | OUTPATIENT
Start: 2020-09-14 | End: 2020-09-14 | Stop reason: SURG

## 2020-09-14 RX ORDER — ONDANSETRON 2 MG/ML
INJECTION INTRAMUSCULAR; INTRAVENOUS AS NEEDED
Status: DISCONTINUED | OUTPATIENT
Start: 2020-09-14 | End: 2020-09-14 | Stop reason: SURG

## 2020-09-14 RX ORDER — HYDROMORPHONE HYDROCHLORIDE 1 MG/ML
0.6 INJECTION, SOLUTION INTRAMUSCULAR; INTRAVENOUS; SUBCUTANEOUS EVERY 5 MIN PRN
Status: DISCONTINUED | OUTPATIENT
Start: 2020-09-14 | End: 2020-09-14 | Stop reason: HOSPADM

## 2020-09-14 RX ORDER — SODIUM CHLORIDE, SODIUM LACTATE, POTASSIUM CHLORIDE, CALCIUM CHLORIDE 600; 310; 30; 20 MG/100ML; MG/100ML; MG/100ML; MG/100ML
INJECTION, SOLUTION INTRAVENOUS CONTINUOUS
Status: DISCONTINUED | OUTPATIENT
Start: 2020-09-14 | End: 2020-09-14 | Stop reason: HOSPADM

## 2020-09-14 RX ORDER — PANTOPRAZOLE SODIUM 40 MG/1
40 TABLET, DELAYED RELEASE ORAL
Status: DISCONTINUED | OUTPATIENT
Start: 2020-09-15 | End: 2020-09-15

## 2020-09-14 RX ORDER — SODIUM CHLORIDE, SODIUM LACTATE, POTASSIUM CHLORIDE, CALCIUM CHLORIDE 600; 310; 30; 20 MG/100ML; MG/100ML; MG/100ML; MG/100ML
INJECTION, SOLUTION INTRAVENOUS CONTINUOUS
Status: DISCONTINUED | OUTPATIENT
Start: 2020-09-14 | End: 2020-09-15

## 2020-09-14 RX ORDER — MORPHINE SULFATE 4 MG/ML
4 INJECTION, SOLUTION INTRAMUSCULAR; INTRAVENOUS EVERY 10 MIN PRN
Status: DISCONTINUED | OUTPATIENT
Start: 2020-09-14 | End: 2020-09-14 | Stop reason: HOSPADM

## 2020-09-14 RX ORDER — HALOPERIDOL 5 MG/ML
0.25 INJECTION INTRAMUSCULAR ONCE AS NEEDED
Status: COMPLETED | OUTPATIENT
Start: 2020-09-14 | End: 2020-09-14

## 2020-09-14 RX ORDER — DEXAMETHASONE SODIUM PHOSPHATE 4 MG/ML
VIAL (ML) INJECTION AS NEEDED
Status: DISCONTINUED | OUTPATIENT
Start: 2020-09-14 | End: 2020-09-14 | Stop reason: SURG

## 2020-09-14 RX ORDER — NALOXONE HYDROCHLORIDE 0.4 MG/ML
80 INJECTION, SOLUTION INTRAMUSCULAR; INTRAVENOUS; SUBCUTANEOUS AS NEEDED
Status: DISCONTINUED | OUTPATIENT
Start: 2020-09-14 | End: 2020-09-14 | Stop reason: HOSPADM

## 2020-09-14 RX ORDER — MORPHINE SULFATE 10 MG/ML
6 INJECTION, SOLUTION INTRAMUSCULAR; INTRAVENOUS EVERY 10 MIN PRN
Status: DISCONTINUED | OUTPATIENT
Start: 2020-09-14 | End: 2020-09-14 | Stop reason: HOSPADM

## 2020-09-14 RX ORDER — GABAPENTIN 300 MG/1
300 CAPSULE ORAL ONCE
Status: COMPLETED | OUTPATIENT
Start: 2020-09-14 | End: 2020-09-14

## 2020-09-14 RX ORDER — PROCHLORPERAZINE EDISYLATE 5 MG/ML
5 INJECTION INTRAMUSCULAR; INTRAVENOUS ONCE AS NEEDED
Status: COMPLETED | OUTPATIENT
Start: 2020-09-14 | End: 2020-09-14

## 2020-09-14 RX ORDER — METOCLOPRAMIDE 10 MG/1
10 TABLET ORAL ONCE
Status: COMPLETED | OUTPATIENT
Start: 2020-09-14 | End: 2020-09-14

## 2020-09-14 RX ORDER — LIDOCAINE HYDROCHLORIDE 10 MG/ML
INJECTION, SOLUTION EPIDURAL; INFILTRATION; INTRACAUDAL; PERINEURAL AS NEEDED
Status: DISCONTINUED | OUTPATIENT
Start: 2020-09-14 | End: 2020-09-14 | Stop reason: SURG

## 2020-09-14 RX ORDER — INSULIN ASPART 100 [IU]/ML
INJECTION, SOLUTION INTRAVENOUS; SUBCUTANEOUS ONCE
Status: COMPLETED | OUTPATIENT
Start: 2020-09-14 | End: 2020-09-14

## 2020-09-14 RX ORDER — DEXTROSE MONOHYDRATE 25 G/50ML
50 INJECTION, SOLUTION INTRAVENOUS
Status: DISCONTINUED | OUTPATIENT
Start: 2020-09-14 | End: 2020-09-14 | Stop reason: HOSPADM

## 2020-09-14 RX ORDER — ONDANSETRON 2 MG/ML
4 INJECTION INTRAMUSCULAR; INTRAVENOUS EVERY 6 HOURS PRN
Status: DISCONTINUED | OUTPATIENT
Start: 2020-09-14 | End: 2020-09-15

## 2020-09-14 RX ORDER — KETOROLAC TROMETHAMINE 15 MG/ML
15 INJECTION, SOLUTION INTRAMUSCULAR; INTRAVENOUS EVERY 6 HOURS
Status: DISCONTINUED | OUTPATIENT
Start: 2020-09-14 | End: 2020-09-15

## 2020-09-14 RX ORDER — HEPARIN SODIUM 5000 [USP'U]/ML
5000 INJECTION, SOLUTION INTRAVENOUS; SUBCUTANEOUS EVERY 8 HOURS SCHEDULED
Status: DISCONTINUED | OUTPATIENT
Start: 2020-09-15 | End: 2020-09-15

## 2020-09-14 RX ORDER — PHENYLEPHRINE HCL 10 MG/ML
VIAL (ML) INJECTION AS NEEDED
Status: DISCONTINUED | OUTPATIENT
Start: 2020-09-14 | End: 2020-09-14 | Stop reason: SURG

## 2020-09-14 RX ORDER — HYDROCODONE BITARTRATE AND ACETAMINOPHEN 5; 325 MG/1; MG/1
1 TABLET ORAL AS NEEDED
Status: DISCONTINUED | OUTPATIENT
Start: 2020-09-14 | End: 2020-09-14 | Stop reason: HOSPADM

## 2020-09-14 RX ADMIN — PHENYLEPHRINE HCL 100 MCG: 10 MG/ML VIAL (ML) INJECTION at 12:49:00

## 2020-09-14 RX ADMIN — SODIUM CHLORIDE, SODIUM LACTATE, POTASSIUM CHLORIDE, CALCIUM CHLORIDE: 600; 310; 30; 20 INJECTION, SOLUTION INTRAVENOUS at 12:44:00

## 2020-09-14 RX ADMIN — SODIUM CHLORIDE, SODIUM LACTATE, POTASSIUM CHLORIDE, CALCIUM CHLORIDE: 600; 310; 30; 20 INJECTION, SOLUTION INTRAVENOUS at 13:15:00

## 2020-09-14 RX ADMIN — ROCURONIUM BROMIDE 5 MG: 10 INJECTION, SOLUTION INTRAVENOUS at 12:35:00

## 2020-09-14 RX ADMIN — CEFAZOLIN SODIUM/WATER 2 G: 2 G/20 ML SYRINGE (ML) INTRAVENOUS at 11:54:00

## 2020-09-14 RX ADMIN — LIDOCAINE HYDROCHLORIDE 25 MG: 10 INJECTION, SOLUTION EPIDURAL; INFILTRATION; INTRACAUDAL; PERINEURAL at 11:47:00

## 2020-09-14 RX ADMIN — ONDANSETRON 4 MG: 2 INJECTION INTRAMUSCULAR; INTRAVENOUS at 13:13:00

## 2020-09-14 RX ADMIN — DEXAMETHASONE SODIUM PHOSPHATE 4 MG: 4 MG/ML VIAL (ML) INJECTION at 11:56:00

## 2020-09-14 RX ADMIN — PHENYLEPHRINE HCL 100 MCG: 10 MG/ML VIAL (ML) INJECTION at 12:39:00

## 2020-09-14 RX ADMIN — PHENYLEPHRINE HCL 100 MCG: 10 MG/ML VIAL (ML) INJECTION at 11:56:00

## 2020-09-14 RX ADMIN — ROCURONIUM BROMIDE 30 MG: 10 INJECTION, SOLUTION INTRAVENOUS at 11:47:00

## 2020-09-14 RX ADMIN — LIDOCAINE HYDROCHLORIDE 25 MG: 10 INJECTION, SOLUTION EPIDURAL; INFILTRATION; INTRACAUDAL; PERINEURAL at 11:43:00

## 2020-09-14 NOTE — H&P
Katie Ward / Xiomy Santos / Bárbara Perkins / Maryann David / Angelic Llamas / Douglas Grewal - [phone]  Answering Service [phone]      Chano Rafael Patient Status:  Inpatient    1985 MRN [id number]   Location [facility] tab po qam and 1 tab pre dinner., Disp: 180 tablet, Rfl: 1, 8/31/2020  atorvastatin 40 MG Oral Tab, TAKE 1 TABLET BY MOUTH EVERY NIGHT, Disp: 90 tablet, Rfl: 1, 8/30/2020  Diclofenac Sodium 50 MG Oral Tab EC, Take 1 tablet (50 mg total) by mouth 2 (two) ti affect  Skin: warm, dry    Data Review: CBC:   Lab Results   Component Value Date    WBC 8.6 05/01/2020    RBC 4.90 05/01/2020     BMP:   Lab Results   Component Value Date    CO2 27.0 06/23/2020    BUN 20 (H) 06/23/2020       Assessment/Plan:   Morbid obes

## 2020-09-14 NOTE — OPERATIVE REPORT
Maryanne Slater / Marcelino Ross / Candis Lance / Keven Hernandez / Allison Tovar / Agustin [facility] - [phone]  [facility] Service [phone]        Date: 9/14/2020  Preop Diagnosis: Morbid Obesity secondary to excess calories   Postop D a supine fashion on the table. A general anesthetic and the patient was intubated without incident. Pre-operative antibiotics were given. The abdomen was prepped and draped in the usual sterile fashion and a timeout was performed.     I gained access to the cone and wound protector. The overhang was stapled off with a 60 white with seamguard and the redundant mesentery taken with a harmonic. That specimen was brought through using an endopouch.  The EGD was placed without difficulty and there was no leaka

## 2020-09-14 NOTE — ANESTHESIA PROCEDURE NOTES
Airway  Urgency: Elective      General Information and Staff    Patient location during procedure: OR  Anesthesiologist: Alonzo Leong MD  Resident/CRNA: Maile Ross CRNA  Performed: CRNA     Indications and Patient Condition  Indications fo

## 2020-09-14 NOTE — PROGRESS NOTES
[facility]    Progress Note    Valerie Gardner Patient Status:  Inpatient    1985 MRN [id number]   Location [address] [facility] Attending Marianna Laguans MD   Hosp Day # 0 PCP Thom Vega.  Tony Sepulveda MD        [facility] HEMODYNAMIC STATUS, DVT PROPHYLAXIS, CLD. Essential hypertension  MONITOR. Diabetes mellitus, type 2 ([facility] [address]  HOLD HOME MEDS, MONITOR ACCU CHECKS.              Results:     Lab Results   Component Value Date    WBC 8.6 05/01/2020    HGB 14.3 05/01

## 2020-09-14 NOTE — ANESTHESIA PREPROCEDURE EVALUATION
Anesthesia PreOp Note    HPI:     Valerie Gardner is a 38year old female who presents for preoperative consultation requested by:  Cami Ocasio MD    Date of Surgery: 9/14/2020    Procedure(s):  BARIATRIC GASTRIC BYPASS LAPAROSCOPIC OPAL  N-Y  Indication: mor Rfl: 2, 8/31/2020  glyBURIDE 2.5 MG Oral Tab, Take 1 tablet (2.5 mg total) by mouth 2 (two) times daily with meals.  1 tab po qam and 1 tab pre dinner., Disp: 180 tablet, Rfl: 1, 8/31/2020  atorvastatin 40 MG Oral Tab, TAKE 1 TABLET BY MOUTH EVERY NIGHT, Rise Brakeman Alcohol use: Yes        Frequency: Monthly or less        Comment: socially       Drug use: Never      Sexual activity: Not on file    Lifestyle      Physical activity:        Days per week: Not on file        Minutes per session: Not on file      Stress: exam   Cardiovascular   Exercise tolerance: good  (+) hypertension (Off meds for past 2 weeks while on liquid diet) well controlled,     Neuro/Psych - negative ROS     GI/Hepatic/Renal      Endo/Other    (+) diabetes mellitus (Off all meds for past 2 weeks

## 2020-09-14 NOTE — ANESTHESIA POSTPROCEDURE EVALUATION
Patient: Ry Masters    Procedure Summary     Date:  09/14/20 Room / Location:  [facility] OR 01 / [facility] OR    Anesthesia Start:  7192 Anesthesia Stop:  0312    Procedure:  BARIATRIC GASTRIC BYPASS LAPAROSCOPIC OPAL  N-Y (N/A ) Diagnosis:  (morbid obesit

## 2020-09-14 NOTE — PROGRESS NOTES
Inpatient Bariatric Nutrition Note      Emily Morton is a 38year old female.     Procedure: Laparoscopic gastric bypass surgery  Surgery Date: 9/14/2020  Medical Diagnosis: Morbid obesity    Height:  Ht Readings from Last 1 Encounters:  09/14/20 : 4' 10\ (TORADOL) 30 MG/ML injection 30 mg, 30 mg, Intravenous, Q6H  •  HYDROcodone-acetaminophen 7.5-325 MG/15ML solution 10 mL, 10 mL, Oral, Q4H PRN **OR** HYDROcodone-acetaminophen 7.5-325 MG/15ML solution 20 mL, 20 mL, Oral, Q4H PRN  •  morphINE sulfate (PF) 2

## 2020-09-15 ENCOUNTER — DOCUMENTATION ONLY (OUTPATIENT)
Dept: SURGERY | Facility: CLINIC | Age: 35
End: 2020-09-15

## 2020-09-15 VITALS
OXYGEN SATURATION: 97 % | RESPIRATION RATE: 18 BRPM | DIASTOLIC BLOOD PRESSURE: 76 MMHG | WEIGHT: 219 LBS | TEMPERATURE: 99 F | HEART RATE: 78 BPM | HEIGHT: 58 IN | BODY MASS INDEX: 45.97 KG/M2 | SYSTOLIC BLOOD PRESSURE: 125 MMHG

## 2020-09-15 LAB
ANION GAP SERPL CALC-SCNC: 8 MMOL/L (ref 0–18)
BASOPHILS # BLD AUTO: 0.03 X10(3) UL (ref 0–0.2)
BASOPHILS NFR BLD AUTO: 0.3 %
BUN BLD-MCNC: 6 MG/DL (ref 7–18)
BUN/CREAT SERPL: 10.3 (ref 10–20)
CALCIUM BLD-MCNC: 8.7 MG/DL (ref 8.5–10.1)
CHLORIDE SERPL-SCNC: 107 MMOL/L (ref 98–112)
CO2 SERPL-SCNC: 23 MMOL/L (ref 21–32)
CREAT BLD-MCNC: 0.58 MG/DL (ref 0.55–1.02)
DEPRECATED RDW RBC AUTO: 41.6 FL (ref 35.1–46.3)
EOSINOPHIL # BLD AUTO: 0.03 X10(3) UL (ref 0–0.7)
EOSINOPHIL NFR BLD AUTO: 0.3 %
ERYTHROCYTE [DISTWIDTH] IN BLOOD BY AUTOMATED COUNT: 12.7 % (ref 11–15)
EST. AVERAGE GLUCOSE BLD GHB EST-MCNC: 143 MG/DL (ref 68–126)
GLUCOSE BLD-MCNC: 192 MG/DL (ref 70–99)
GLUCOSE BLDC GLUCOMTR-MCNC: 177 MG/DL (ref 70–99)
HBA1C MFR BLD HPLC: 6.6 % (ref ?–5.7)
HCT VFR BLD AUTO: 38.8 % (ref 35–48)
HGB BLD-MCNC: 13.1 G/DL (ref 12–16)
IMM GRANULOCYTES # BLD AUTO: 0.03 X10(3) UL (ref 0–1)
IMM GRANULOCYTES NFR BLD: 0.3 %
LYMPHOCYTES # BLD AUTO: 2.55 X10(3) UL (ref 1–4)
LYMPHOCYTES NFR BLD AUTO: 24.1 %
MCH RBC QN AUTO: 29.8 PG (ref 26–34)
MCHC RBC AUTO-ENTMCNC: 33.8 G/DL (ref 31–37)
MCV RBC AUTO: 88.4 FL (ref 80–100)
MONOCYTES # BLD AUTO: 0.81 X10(3) UL (ref 0.1–1)
MONOCYTES NFR BLD AUTO: 7.7 %
NEUTROPHILS # BLD AUTO: 7.13 X10 (3) UL (ref 1.5–7.7)
NEUTROPHILS # BLD AUTO: 7.13 X10(3) UL (ref 1.5–7.7)
NEUTROPHILS NFR BLD AUTO: 67.3 %
OSMOLALITY SERPL CALC.SUM OF ELEC: 289 MOSM/KG (ref 275–295)
PLATELET # BLD AUTO: 240 10(3)UL (ref 150–450)
POTASSIUM SERPL-SCNC: 4.2 MMOL/L (ref 3.5–5.1)
RBC # BLD AUTO: 4.39 X10(6)UL (ref 3.8–5.3)
SODIUM SERPL-SCNC: 138 MMOL/L (ref 136–145)
WBC # BLD AUTO: 10.6 X10(3) UL (ref 4–11)

## 2020-09-15 RX ORDER — PANTOPRAZOLE SODIUM 40 MG/1
40 TABLET, DELAYED RELEASE ORAL
Qty: 90 TABLET | Refills: 0 | Status: SHIPPED | OUTPATIENT
Start: 2020-09-16 | End: 2021-09-09

## 2020-09-15 NOTE — PLAN OF CARE
Reviewed CGM agreement with patient. Verified location to left arm. Patient in agreement and signed form.

## 2020-09-15 NOTE — PROGRESS NOTES
Provided/reviewed bariatric discharge instructions; stressed importance of fluids, vits and post-op f/u; instructed pt on caring for incision sites; activities allowed/avoid; meds to take and avoid; managing constipation; signs and symptoms of concern; con

## 2020-09-15 NOTE — PLAN OF CARE
Problem: Patient Centered Care  Goal: Patient preferences are identified and integrated in the patient's plan of care  Description  Interventions:  - What would you like us to know as we care for you?   - Provide timely, complete, and accurate informatio period  Description  INTERVENTIONS  - Monitor WBC  - Administer growth factors as ordered  - Implement neutropenic guidelines  Outcome: Adequate for Discharge     Problem: SAFETY ADULT - FALL  Goal: Free from fall injury  Description  INTERVENTIONS:  - Ass assist. 5 lap sites C/D/I. L arm glucose monitor in place. Pt was cleared by bariatric surgeon and medical to discharge. Pt will discharge home with family by private car. Pt is safe to discharge.

## 2020-09-15 NOTE — PLAN OF CARE
Problem: Diabetes/Glucose Control  Goal: Glucose maintained within prescribed range  Description  INTERVENTIONS:  - Monitor Blood Glucose as ordered  - Assess for signs and symptoms of hyperglycemia and hypoglycemia  - Administer ordered medications to m devices as appropriate  - Consider OT/PT consult to assist with strengthening/mobility  - Encourage toileting schedule  Outcome: Progressing     Problem: GASTROINTESTINAL - ADULT  Goal: Minimal or absence of nausea and vomiting  Description  INTERVENTIONS:

## 2020-09-15 NOTE — PLAN OF CARE
ARRIVED POST OP. MED REC COMPLETED. DIETARY CONSULT COMPLETED. RECORDING INTAKE AS DIRECTED. DENIES NAUSEA. UP WITH ASSIST, STEADY GAIT. SPLINTING ABDOMEN. VOIDING.

## 2020-09-15 NOTE — PROGRESS NOTES
Akhil Tee / Dayo Narvaez / Camacho Roach / Adina Leong / Carolyn Gallo / Fanta [place] - [phone]  Answering Service [phone]      Progress Note    Anushka Mcintyre Patient Status:  Inpatient    1985 MRN [id number] Oral, Q15 Min PRN    Or  dextrose 50 % injection 50 mL, 50 mL, Intravenous, Q15 Min PRN    Or  glucose (DEX4) oral liquid 30 g, 30 g, Oral, Q15 Min PRN    Or  Glucose-Vitamin C (DEX-4) chewable tab 8 tablet, 8 tablet, Oral, Q15 Min PRN  Insulin Aspart Pen

## 2020-09-16 ENCOUNTER — PATIENT MESSAGE (OUTPATIENT)
Dept: INTERNAL MEDICINE CLINIC | Facility: CLINIC | Age: 35
End: 2020-09-16

## 2020-09-16 NOTE — TELEPHONE ENCOUNTER
I do not believe any of those come in liquid forms. I would continue the metoprolol the same type. She can break up the tablets and see if that will work.

## 2020-09-16 NOTE — TELEPHONE ENCOUNTER
From: Jayson Siegel  To: Wolfgang Dobson. Lisseth Martinez MD  Sent: 9/16/2020 9:26 AM CDT  Subject: Prescription Question    Hi Dr. Lisseth Martinez,    I just had my surgery on Monday and I'm now at home.  Today I was instructed to restart taking my medication and to cut them as sm

## 2020-09-17 ENCOUNTER — TELEPHONE (OUTPATIENT)
Dept: SURGERY | Facility: CLINIC | Age: 35
End: 2020-09-17

## 2020-09-17 DIAGNOSIS — Z98.84 S/P GASTRIC BYPASS: Primary | ICD-10-CM

## 2020-09-17 NOTE — TELEPHONE ENCOUNTER
Spoke to patient    Feeling light headed and dizzy    I went over my instructions for her medications. She continued all meds after discharge.     Will discontinue glyburide, metoprolol, statin and insulin    May also need to discontinue lisinopril and m

## 2020-09-17 NOTE — TELEPHONE ENCOUNTER
Any major problems? Patient is doing well     Is the abdominal pain to the point where you still need narcotic pain medication? Patient is taking pain med every 4 hours.  Pain level is a 3 or 4 patient reports NO fever/trouble breathing/chest pain/couch/sole

## 2020-09-17 NOTE — TELEPHONE ENCOUNTER
Please call patient regarding medications post sx. Patient is confused started taking BP and diabetes medicaitons per her discharge instructions. Sugar lever was 89 fasting this morning. She did take all med's this morning.  Please advise

## 2020-09-23 ENCOUNTER — OFFICE VISIT (OUTPATIENT)
Dept: INTERNAL MEDICINE CLINIC | Facility: CLINIC | Age: 35
End: 2020-09-23

## 2020-09-23 ENCOUNTER — OFFICE VISIT (OUTPATIENT)
Dept: SURGERY | Facility: CLINIC | Age: 35
End: 2020-09-23

## 2020-09-23 ENCOUNTER — DOCUMENTATION ONLY (OUTPATIENT)
Dept: SURGERY | Facility: CLINIC | Age: 35
End: 2020-09-23

## 2020-09-23 VITALS
HEART RATE: 88 BPM | BODY MASS INDEX: 45.26 KG/M2 | WEIGHT: 215.63 LBS | SYSTOLIC BLOOD PRESSURE: 122 MMHG | OXYGEN SATURATION: 99 % | DIASTOLIC BLOOD PRESSURE: 81 MMHG | HEIGHT: 57.8 IN

## 2020-09-23 VITALS
HEART RATE: 84 BPM | BODY MASS INDEX: 45.97 KG/M2 | DIASTOLIC BLOOD PRESSURE: 66 MMHG | TEMPERATURE: 98 F | HEIGHT: 57.5 IN | WEIGHT: 216 LBS | OXYGEN SATURATION: 98 % | SYSTOLIC BLOOD PRESSURE: 112 MMHG

## 2020-09-23 DIAGNOSIS — Z98.84 S/P GASTRIC BYPASS: ICD-10-CM

## 2020-09-23 DIAGNOSIS — Z98.84 S/P GASTRIC BYPASS: Primary | ICD-10-CM

## 2020-09-23 DIAGNOSIS — I10 ESSENTIAL HYPERTENSION: Primary | ICD-10-CM

## 2020-09-23 DIAGNOSIS — E11.65 UNCONTROLLED TYPE 2 DIABETES MELLITUS WITH HYPERGLYCEMIA (HCC): ICD-10-CM

## 2020-09-23 PROCEDURE — 99214 OFFICE O/P EST MOD 30 MIN: CPT | Performed by: NURSE PRACTITIONER

## 2020-09-23 PROCEDURE — 3074F SYST BP LT 130 MM HG: CPT | Performed by: NURSE PRACTITIONER

## 2020-09-23 PROCEDURE — 3078F DIAST BP <80 MM HG: CPT | Performed by: NURSE PRACTITIONER

## 2020-09-23 PROCEDURE — 3008F BODY MASS INDEX DOCD: CPT | Performed by: NURSE PRACTITIONER

## 2020-09-23 RX ORDER — ERGOCALCIFEROL (VITAMIN D2) 1250 MCG
CAPSULE ORAL
COMMUNITY
End: 2020-09-23

## 2020-09-23 RX ORDER — GLYBURIDE 2.5 MG/1
TABLET ORAL
COMMUNITY
End: 2020-09-23

## 2020-09-23 NOTE — PROGRESS NOTES
HPI:    Patient ID: Zeny Amaya is a 38year old female. Patient presents with:  Surgical Followup: Gastric bypass on 9/14/2020  Patient following up s/p gastric bypass with Dr. Shira Cleaning on 9/14/2020. Patient f/u with Dr Shira Cleaning today.  Pt states she had dizzi Patient is here for follow up of hypertension. BP at home: 112's/66. Has been compliant with medications. Exercise level: walking 2 times a day for 20-25 minutes and HAS been following low salt diet. Weight has been stable.   Wt Readings from Last 3 [place] Eyes: Negative. Respiratory: Negative. Cardiovascular: Negative. Gastrointestinal: Negative. Endocrine: Negative. Genitourinary: Negative. Musculoskeletal: Negative. Skin: Positive for wound (s/p gastric bypass surgery).    Allergic/Imm Drug use: Never       PHYSICAL EXAM:   /66 (BP Location: Right arm, Cuff Size: adult)   Pulse 84   Temp 98.3 °F (36.8 °C) (Temporal)   Ht 4' 9.5\" (1.461 m)   Wt 216 lb (98 kg)   LMP 09/21/2020 (Exact Date)   SpO2 98%   Breastfeeding No   BMI 45.93 Abdominal: Soft. Bowel sounds are normal. She exhibits no distension and no mass. There is no abdominal tenderness. Musculoskeletal: Normal range of motion. General: No deformity or edema.    Lymphadenopathy:        Head (right side): No submental Continue post surgery instructions given by Dr. Katrin Ayala  Follow up with Dr. Katrin Ayala    Follow up on Oct. 30, 2020 as scheduled or sooner if needed. No orders of the defined types were placed in this encounter.       Meds This Visit:  Requested Prescriptions

## 2020-09-23 NOTE — PROGRESS NOTES
Provided/reviewed bariatric discharge instructions and HELP card; instructed pt on importance of fluids, vits, post-op f/u, signs and symptoms of concern, meds to avoid, activities allowed, diet progression, etc.  Instructed pt to keep card in wallet and i

## 2020-09-23 NOTE — PATIENT INSTRUCTIONS
ASSESSMENT/PLAN:   Essential hypertension  (primary encounter diagnosis)  Careful with diet and excercise at least 30 minutes 3-4 times a week. Check blood pressures at different times on different days. Avoid fried foods. No salt.  Use other seasoni

## 2020-10-09 ENCOUNTER — OFFICE VISIT (OUTPATIENT)
Dept: SURGERY | Facility: CLINIC | Age: 35
End: 2020-10-09

## 2020-10-09 VITALS
BODY MASS INDEX: 45.95 KG/M2 | OXYGEN SATURATION: 100 % | SYSTOLIC BLOOD PRESSURE: 115 MMHG | WEIGHT: 213 LBS | HEART RATE: 75 BPM | HEIGHT: 57.2 IN | DIASTOLIC BLOOD PRESSURE: 71 MMHG

## 2020-10-09 VITALS — HEIGHT: 58 IN | BODY MASS INDEX: 44.78 KG/M2 | WEIGHT: 213.31 LBS

## 2020-10-09 DIAGNOSIS — Z98.84 S/P GASTRIC BYPASS: ICD-10-CM

## 2020-10-09 DIAGNOSIS — E78.2 HYPERCHOLESTEROLEMIA WITH HYPERTRIGLYCERIDEMIA: ICD-10-CM

## 2020-10-09 DIAGNOSIS — E66.01 MORBID OBESITY WITH BMI OF 45.0-49.9, ADULT (HCC): ICD-10-CM

## 2020-10-09 DIAGNOSIS — I10 ESSENTIAL HYPERTENSION: ICD-10-CM

## 2020-10-09 DIAGNOSIS — E11.65 UNCONTROLLED TYPE 2 DIABETES MELLITUS WITH HYPERGLYCEMIA (HCC): Primary | ICD-10-CM

## 2020-10-09 DIAGNOSIS — E66.01 CLASS 3 SEVERE OBESITY DUE TO EXCESS CALORIES WITH SERIOUS COMORBIDITY AND BODY MASS INDEX (BMI) OF 40.0 TO 44.9 IN ADULT (HCC): Primary | ICD-10-CM

## 2020-10-09 PROCEDURE — 3008F BODY MASS INDEX DOCD: CPT | Performed by: INTERNAL MEDICINE

## 2020-10-09 PROCEDURE — 97803 MED NUTRITION INDIV SUBSEQ: CPT | Performed by: DIETITIAN, REGISTERED

## 2020-10-09 PROCEDURE — 99214 OFFICE O/P EST MOD 30 MIN: CPT | Performed by: INTERNAL MEDICINE

## 2020-10-09 PROCEDURE — 3008F BODY MASS INDEX DOCD: CPT | Performed by: DIETITIAN, REGISTERED

## 2020-10-09 PROCEDURE — 3078F DIAST BP <80 MM HG: CPT | Performed by: INTERNAL MEDICINE

## 2020-10-09 PROCEDURE — 3074F SYST BP LT 130 MM HG: CPT | Performed by: INTERNAL MEDICINE

## 2020-10-09 NOTE — PROGRESS NOTES
[address]  [address] [address]  Dept: [phone]  Loc: [phone]    10/09/20      Bariatric Follow-up Nutrition Session    Koki Briones is a 38year old female.      Assess Vitamins/Minerals:  Lab Results   Component Value Date    B12 377 05/01/2020     Lab Results   Component Value Date    VITD 25.2 (L) 05/01/2020     Lab Results   Component Value Date/Time    THIAMINE 144 05/01/2020 08:21 AM      No results found for: V intolerances:   Mashed cauliflower caused chest/gas pain, very cold things cause discomfort  Vitamin/mineral supplements:  Bariatric Choice  Protein supplements:  Premier Protein     Activity Level: active- walking 45-60 minutes every day    Other:  Pt seen

## 2020-10-09 NOTE — PROGRESS NOTES
[address] [facility]  [place] [address]  Dept: [phone]    Patient:  Mel Torres  :      1985  MRN:      [id number]    Referring Provider: Nya Wing.  Solo Burgos, socially     Drug use: Never    Surgical History:    Past Surgical History:   Procedure Laterality Date   • BARIATRIC GASTRIC BYPASS LAPAROSCOPIC OPAL  N-Y N/A 9/14/2020    Performed by Jm Chua MD at [address]   • LAP GASTRIC BYPASS/OPAL-EN-Y  09/14 negative    Assessment     DIABETES:  The patient's blood sugars were reviewed with the patient with averages ranging from . The patient denies any episodes of hypoglycemia since her last clinic visit.   she denies any lower extremity skin breakdown

## 2020-10-28 ENCOUNTER — OFFICE VISIT (OUTPATIENT)
Dept: SURGERY | Facility: CLINIC | Age: 35
End: 2020-10-28

## 2020-10-28 VITALS
HEART RATE: 82 BPM | SYSTOLIC BLOOD PRESSURE: 113 MMHG | OXYGEN SATURATION: 98 % | DIASTOLIC BLOOD PRESSURE: 76 MMHG | BODY MASS INDEX: 42.88 KG/M2 | WEIGHT: 204.31 LBS | HEIGHT: 57.8 IN

## 2020-10-28 DIAGNOSIS — Z98.84 S/P GASTRIC BYPASS: ICD-10-CM

## 2020-10-28 DIAGNOSIS — I10 ESSENTIAL HYPERTENSION: Primary | ICD-10-CM

## 2020-10-28 DIAGNOSIS — E78.00 HIGH CHOLESTEROL: ICD-10-CM

## 2020-10-30 ENCOUNTER — TELEPHONE (OUTPATIENT)
Dept: INTERNAL MEDICINE CLINIC | Facility: CLINIC | Age: 35
End: 2020-10-30

## 2020-10-30 ENCOUNTER — OFFICE VISIT (OUTPATIENT)
Dept: INTERNAL MEDICINE CLINIC | Facility: CLINIC | Age: 35
End: 2020-10-30

## 2020-10-30 VITALS
SYSTOLIC BLOOD PRESSURE: 114 MMHG | OXYGEN SATURATION: 98 % | HEART RATE: 76 BPM | DIASTOLIC BLOOD PRESSURE: 76 MMHG | WEIGHT: 202 LBS | BODY MASS INDEX: 43 KG/M2

## 2020-10-30 DIAGNOSIS — E78.00 HIGH CHOLESTEROL: ICD-10-CM

## 2020-10-30 DIAGNOSIS — Z00.00 VISIT FOR ANNUAL HEALTH EXAMINATION: Primary | ICD-10-CM

## 2020-10-30 DIAGNOSIS — E78.2 HYPERCHOLESTEROLEMIA WITH HYPERTRIGLYCERIDEMIA: ICD-10-CM

## 2020-10-30 DIAGNOSIS — E11.65 UNCONTROLLED TYPE 2 DIABETES MELLITUS WITH HYPERGLYCEMIA (HCC): ICD-10-CM

## 2020-10-30 DIAGNOSIS — S90.415A ABRASION OF TOE OF LEFT FOOT, INITIAL ENCOUNTER: ICD-10-CM

## 2020-10-30 DIAGNOSIS — I10 ESSENTIAL HYPERTENSION: ICD-10-CM

## 2020-10-30 PROCEDURE — 90471 IMMUNIZATION ADMIN: CPT | Performed by: NURSE PRACTITIONER

## 2020-10-30 PROCEDURE — 99395 PREV VISIT EST AGE 18-39: CPT | Performed by: NURSE PRACTITIONER

## 2020-10-30 PROCEDURE — 90686 IIV4 VACC NO PRSV 0.5 ML IM: CPT | Performed by: NURSE PRACTITIONER

## 2020-10-30 PROCEDURE — 3074F SYST BP LT 130 MM HG: CPT | Performed by: NURSE PRACTITIONER

## 2020-10-30 PROCEDURE — 3078F DIAST BP <80 MM HG: CPT | Performed by: NURSE PRACTITIONER

## 2020-10-30 PROCEDURE — 36415 COLL VENOUS BLD VENIPUNCTURE: CPT | Performed by: NURSE PRACTITIONER

## 2020-10-30 RX ORDER — BLOOD-GLUCOSE TRANSMITTER
1 EACH MISCELLANEOUS SEE ADMIN INSTRUCTIONS
Qty: 3 EACH | Refills: 3 | Status: SHIPPED | OUTPATIENT
Start: 2020-10-30

## 2020-10-30 RX ORDER — BLOOD-GLUCOSE,RECEIVER,CONT
1 EACH MISCELLANEOUS SEE ADMIN INSTRUCTIONS
Qty: 1 DEVICE | Refills: 0 | Status: SHIPPED | OUTPATIENT
Start: 2020-10-30

## 2020-10-30 RX ORDER — BLOOD-GLUCOSE SENSOR
1 EACH MISCELLANEOUS SEE ADMIN INSTRUCTIONS
Qty: 9 EACH | Refills: 3 | Status: SHIPPED | OUTPATIENT
Start: 2020-10-30

## 2020-10-30 NOTE — PATIENT INSTRUCTIONS
ASSESSMENT/PLAN:   Visit for annual health examination  (primary encounter diagnosis)  Check urine    Uncontrolled type 2 diabetes mellitus with hyperglycemia (hcc)  Careful with diet and excercise at least 30 minutes 3-4 times a week.  Check sugars

## 2020-10-30 NOTE — TELEPHONE ENCOUNTER
Patient was seen in the office today for annual office visit. States that she is due to receive a new Dexcom but insurance states it is pending provider approval.  Could we look into this to see status of patient's new Dexcom?

## 2020-10-30 NOTE — PROGRESS NOTES
HPI:    Patient ID: Siena Ojeda is a 38year old female. Siena Ojeda is a 38year old female who presents for a complete physical exam.   HPI:   Patient presents with:  Physical: flu vaccine       Patient's last menstrual period was 10/28/2020.    iovation There are no preventive care reminders to display for this patient. Current Outpatient Medications   Medication Sig Dispense Refill   • metFORMIN HCl 500 MG Oral Tab Take 0.5 tablets (250 mg total) by mouth 2 (two) times daily with meals.  30 tablet 0 Comment: socially       Drug use: Never        Patient here for follow up of Diabetes. Has been taking medications regularly. Checks sugars checks with dexacom  Fasting sugars average 95, 103, 2 lows after surgery. Last low 1 month ago.  Dr. Daley Listen Eyes: Negative. Respiratory: Negative. Cardiovascular: Negative. Gastrointestinal: Negative. Endocrine: Negative. Genitourinary: Negative. Musculoskeletal: Negative. Skin: Negative. Allergic/Immunologic: Negative.     Neurological: N Frequency: Monthly or less      Comment: socially     Drug use: Never       PHYSICAL EXAM:   /76 (BP Location: Left arm, Patient Position: Sitting, Cuff Size: adult)   Pulse 76   Wt 202 lb (91.6 kg)   LMP 10/28/2020   SpO2 98%   BMI 42.51 kg/m² Pulmonary/Chest: Effort normal and breath sounds normal. No stridor. No respiratory distress. She has no wheezes. She has no rales. She exhibits no tenderness.  Right breast exhibits no inverted nipple, no mass, no nipple discharge, no skin change and no te Careful with diet and excercise at least 30 minutes 3-4 times a week. Check sugars at different times on different dates. Careful with low sugars. Carry something with you and check sugar if can. Can carry dylan cracker, etc. Decrease carbohydrates.  But a

## 2020-11-02 NOTE — TELEPHONE ENCOUNTER
Dennis dey from [facility] calling to follow up.  Asking if orders for dexcom were received,

## 2020-11-22 ENCOUNTER — PATIENT MESSAGE (OUTPATIENT)
Dept: INTERNAL MEDICINE CLINIC | Facility: CLINIC | Age: 35
End: 2020-11-22

## 2020-11-23 NOTE — TELEPHONE ENCOUNTER
From: Sadie Me  To: FADY Engel  Sent: 11/22/2020 2:17 PM CST  Subject: Other    Hello,    I recently traveled to [place] and my family is in need of my help to care for my grandma and they would like me to get a Covid test. Is this something I c

## 2020-12-17 ENCOUNTER — OFFICE VISIT (OUTPATIENT)
Dept: PODIATRY CLINIC | Facility: CLINIC | Age: 35
End: 2020-12-17

## 2020-12-17 DIAGNOSIS — E11.65 UNCONTROLLED TYPE 2 DIABETES MELLITUS WITH HYPERGLYCEMIA (HCC): ICD-10-CM

## 2020-12-17 DIAGNOSIS — B07.9 VERRUCA: Primary | ICD-10-CM

## 2020-12-17 PROCEDURE — 99203 OFFICE O/P NEW LOW 30 MIN: CPT | Performed by: PODIATRIST

## 2020-12-17 NOTE — PROGRESS NOTES
Araceli Montenegro is a 38year old female. Patient presents with:  Blisters: Patient present for blister on left pinky toe for the past 6 months. Denies any pain, fever or d/c. BS .  Last A1C was 9/15/20, 6.6        HPI:   This pleasant patient presents to Date   • BARIATRIC GASTRIC BYPASS LAPAROSCOPIC OPAL  N-Y N/A 9/14/2020    Performed by Bean Escobar MD at [address]   • LAP GASTRIC BYPASS/OPAL-EN-Y  09/14/2020    Dr Elsie Sands, [facility]      Family History   Problem Relation Age of Onset   • Hyperte dermatomes on both feet.    4. Musculoskeletal: Patient does not have any significant foot deformities may be a mild varus rotation to the fifth digit bilateral.  She has a hyperkeratotic lesion at the lateral aspect of the nail edge on the fifth toe of the

## 2020-12-23 ENCOUNTER — OFFICE VISIT (OUTPATIENT)
Dept: SURGERY | Facility: CLINIC | Age: 35
End: 2020-12-23

## 2020-12-23 VITALS
SYSTOLIC BLOOD PRESSURE: 127 MMHG | WEIGHT: 188.88 LBS | HEIGHT: 57.8 IN | OXYGEN SATURATION: 100 % | DIASTOLIC BLOOD PRESSURE: 85 MMHG | BODY MASS INDEX: 39.65 KG/M2 | HEART RATE: 84 BPM

## 2020-12-23 DIAGNOSIS — Z98.84 S/P GASTRIC BYPASS: Primary | ICD-10-CM

## 2020-12-23 NOTE — PROGRESS NOTES
[address] [facility], [address]  [address] [facility] [address]  Dept: [phone]    12/23/2020   Bariatric Patient Follow-up Evaluation    Chief Complaint:  morbid obesity, preop 235, 9/14/2 education level: Not on file    Occupational History      Occupation: HR.      Tobacco Use      Smoking status: Never Smoker      Smokeless tobacco: Never Used    Substance and Sexual Activity      Alcohol use: Yes        Frequency: Monthly or less        C acute distress, well-nourished  HENT: normocephalic, atraumatic  Lung: breathing comfortably, symmetrical expansion, clear to ausculation bilaterally, no wheezing  Heart: regular rate and rhythm, no murmur detected  Abdomen: soft, non-tender, non-distended

## 2020-12-28 ENCOUNTER — OFFICE VISIT (OUTPATIENT)
Dept: SURGERY | Facility: CLINIC | Age: 35
End: 2020-12-28

## 2020-12-28 VITALS
HEIGHT: 57.2 IN | SYSTOLIC BLOOD PRESSURE: 126 MMHG | OXYGEN SATURATION: 99 % | WEIGHT: 188.63 LBS | HEART RATE: 70 BPM | DIASTOLIC BLOOD PRESSURE: 80 MMHG | BODY MASS INDEX: 40.69 KG/M2

## 2020-12-28 DIAGNOSIS — Z98.84 S/P GASTRIC BYPASS: ICD-10-CM

## 2020-12-28 DIAGNOSIS — E11.65 UNCONTROLLED TYPE 2 DIABETES MELLITUS WITH HYPERGLYCEMIA (HCC): Primary | ICD-10-CM

## 2020-12-28 DIAGNOSIS — E66.01 CLASS 3 SEVERE OBESITY DUE TO EXCESS CALORIES WITH SERIOUS COMORBIDITY AND BODY MASS INDEX (BMI) OF 40.0 TO 44.9 IN ADULT (HCC): ICD-10-CM

## 2020-12-28 PROBLEM — E66.813 CLASS 3 SEVERE OBESITY DUE TO EXCESS CALORIES WITH SERIOUS COMORBIDITY AND BODY MASS INDEX (BMI) OF 40.0 TO 44.9 IN ADULT (HCC): Status: ACTIVE | Noted: 2020-09-14

## 2020-12-28 PROBLEM — E66.813 CLASS 3 SEVERE OBESITY DUE TO EXCESS CALORIES WITH SERIOUS COMORBIDITY AND BODY MASS INDEX (BMI) OF 40.0 TO 44.9 IN ADULT: Status: ACTIVE | Noted: 2020-09-14

## 2020-12-28 PROCEDURE — 3079F DIAST BP 80-89 MM HG: CPT | Performed by: INTERNAL MEDICINE

## 2020-12-28 PROCEDURE — 99214 OFFICE O/P EST MOD 30 MIN: CPT | Performed by: INTERNAL MEDICINE

## 2020-12-28 PROCEDURE — 3008F BODY MASS INDEX DOCD: CPT | Performed by: INTERNAL MEDICINE

## 2020-12-28 PROCEDURE — 3074F SYST BP LT 130 MM HG: CPT | Performed by: INTERNAL MEDICINE

## 2020-12-28 NOTE — PROGRESS NOTES
[address], [facility] [address]  [address] [facility] [address]  Dept: [phone]    Patient:  Ry Masters  :      1985  MRN:      [id number]    Referring Provider: Fide Emery.  Alma De La Vega, (two) times daily with meals. , Disp: 30 tablet, Rfl: 0  •  Multiple Vitamin (MULTIVITAMINS OR), Take by mouth.  Bariatric vitamins , Disp: , Rfl:   •  Pantoprazole Sodium 40 MG Oral Tab EC, Take 1 tablet (40 mg total) by mouth every morning before breakfast tenderness. Lungs: clear to auscultation bilaterally  Heart: S1, S2 normal, no murmur, click, rub or gallop, regular rate and rhythm  Abdomen: soft, obese non tender.    Extremities: extremities normal, atraumatic, no cyanosis or edema  Pulses: 2+ and symm Standing Expiration Date: 12/28/2021          Order Specific Question: Release to patient          Answer: Immediate      Vitamin B1 (Thiamine), Blood          Standing Status: Future          Standing Expiration Date: 12/28/2021          Order Specific Lakia Valadez

## 2021-01-04 ENCOUNTER — TELEPHONE (OUTPATIENT)
Dept: SURGERY | Facility: CLINIC | Age: 36
End: 2021-01-04

## 2021-01-04 NOTE — TELEPHONE ENCOUNTER
Appointment audit/chart review phone call per protocol.       Per records, patient has appointments as follows:  Surgeon: 12/23/2020  Dietitian: 10/9/2020, upcoming 1/8/2021  Bariatrician: 12/28/2020  Requested a call back to inform office if pt has been ho

## 2021-01-08 ENCOUNTER — OFFICE VISIT (OUTPATIENT)
Dept: SURGERY | Facility: CLINIC | Age: 36
End: 2021-01-08

## 2021-01-08 ENCOUNTER — LAB ENCOUNTER (OUTPATIENT)
Dept: LAB | Facility: HOSPITAL | Age: 36
End: 2021-01-08
Attending: INTERNAL MEDICINE
Payer: COMMERCIAL

## 2021-01-08 VITALS — HEIGHT: 57.2 IN | BODY MASS INDEX: 39.83 KG/M2 | WEIGHT: 184.63 LBS

## 2021-01-08 DIAGNOSIS — E66.01 CLASS 2 SEVERE OBESITY DUE TO EXCESS CALORIES WITH SERIOUS COMORBIDITY AND BODY MASS INDEX (BMI) OF 39.0 TO 39.9 IN ADULT (HCC): Primary | ICD-10-CM

## 2021-01-08 DIAGNOSIS — E78.00 HIGH CHOLESTEROL: ICD-10-CM

## 2021-01-08 DIAGNOSIS — E11.65 UNCONTROLLED TYPE 2 DIABETES MELLITUS WITH HYPERGLYCEMIA (HCC): ICD-10-CM

## 2021-01-08 DIAGNOSIS — E66.01 CLASS 3 SEVERE OBESITY DUE TO EXCESS CALORIES WITH SERIOUS COMORBIDITY AND BODY MASS INDEX (BMI) OF 40.0 TO 44.9 IN ADULT (HCC): ICD-10-CM

## 2021-01-08 DIAGNOSIS — Z98.84 S/P GASTRIC BYPASS: ICD-10-CM

## 2021-01-08 LAB
CHOLEST SMN-MCNC: 216 MG/DL (ref ?–200)
HDLC SERPL-MCNC: 52 MG/DL (ref 40–59)
LDLC SERPL CALC-MCNC: 140 MG/DL (ref ?–100)
NONHDLC SERPL-MCNC: 164 MG/DL (ref ?–130)
PATIENT FASTING Y/N/NP: YES
TRIGL SERPL-MCNC: 119 MG/DL (ref 30–149)
VIT B12 SERPL-MCNC: 251 PG/ML (ref 193–986)
VLDLC SERPL CALC-MCNC: 24 MG/DL (ref 0–30)

## 2021-01-08 PROCEDURE — 82607 VITAMIN B-12: CPT

## 2021-01-08 PROCEDURE — 80061 LIPID PANEL: CPT

## 2021-01-08 PROCEDURE — 97803 MED NUTRITION INDIV SUBSEQ: CPT | Performed by: DIETITIAN, REGISTERED

## 2021-01-08 PROCEDURE — 3008F BODY MASS INDEX DOCD: CPT | Performed by: DIETITIAN, REGISTERED

## 2021-01-08 PROCEDURE — 84425 ASSAY OF VITAMIN B-1: CPT

## 2021-01-08 PROCEDURE — 36415 COLL VENOUS BLD VENIPUNCTURE: CPT

## 2021-01-08 NOTE — PROGRESS NOTES
[facility]  [address] [place] [address]  Dept: [phone]  Loc: [phone]    01/08/21      Bariatric Follow-up Nutrition Session    Natasha Dodie is a 38year old female.      Assess 10/30/2020        Vitamins/Minerals:  Lab Results   Component Value Date    B12 377 05/01/2020     Lab Results   Component Value Date    VITD 25.2 (L) 05/01/2020     Lab Results   Component Value Date/Time    THIAMINE 144 05/01/2020 08:21 AM      No result history:       Breakfast      AM Snack       Lunch     PM Snack     Dinner   Smoothie - 1/2 banana, 1/2 cup mixed berries, 1 scoop protein powder, collagen, 1 cup almond milk, spinach 100 calorie mixed nut package + string cheese + slice of salami Salad + Improvement shown     Intervention     Recommendations/goals:    1. Consider tracking food again  2. Stay between 600-900 calories  3. Continue adequate fluid intake  4. Switch to Bariatric Capsule MV  5.  Continue with exercise        Monitor/Evaluate

## 2021-01-11 LAB — VITAMIN B1 (THIAMINE), WHOLE B: 127 NMOL/L

## 2021-02-18 ENCOUNTER — MED REC SCAN ONLY (OUTPATIENT)
Dept: INTERNAL MEDICINE CLINIC | Facility: CLINIC | Age: 36
End: 2021-02-18

## 2021-02-18 PROBLEM — E11.3293 MILD NONPROLIFERATIVE DIABETIC RETINOPATHY OF BOTH EYES WITHOUT MACULAR EDEMA ASSOCIATED WITH TYPE 2 DIABETES MELLITUS (HCC): Status: ACTIVE | Noted: 2021-02-18

## 2021-02-24 ENCOUNTER — TELEPHONE (OUTPATIENT)
Dept: INTERNAL MEDICINE CLINIC | Facility: CLINIC | Age: 36
End: 2021-02-24

## 2021-02-24 DIAGNOSIS — E11.65 UNCONTROLLED TYPE 2 DIABETES MELLITUS WITH HYPERGLYCEMIA (HCC): Primary | ICD-10-CM

## 2021-02-24 NOTE — TELEPHONE ENCOUNTER
[email or web address] to check status on dexcom order. I see nothing on file, please advise. They initially requested on 2/8.     Fax: [phone]

## 2021-02-24 NOTE — TELEPHONE ENCOUNTER
Dr. Brown Alt, DME order has been pended. Please advise.      **Dr. Germaine Hall is currently out of the office**    Please reply to pool: [facility] CARE - MAIN

## 2021-03-04 NOTE — TELEPHONE ENCOUNTER
Andrei Arboleda called asking for a referral status update on the cgm and transmitter. I faxed the referral to charlie to [phone].  She was wanting to know the quantity and stated to her I don't see a quantity so I provided her with referral department phone

## 2021-03-08 ENCOUNTER — TELEPHONE (OUTPATIENT)
Dept: SURGERY | Facility: CLINIC | Age: 36
End: 2021-03-08

## 2021-03-17 DIAGNOSIS — E66.01 MORBID OBESITY WITH BMI OF 45.0-49.9, ADULT (HCC): ICD-10-CM

## 2021-03-17 DIAGNOSIS — Z98.84 S/P GASTRIC BYPASS: ICD-10-CM

## 2021-03-17 DIAGNOSIS — E11.65 UNCONTROLLED TYPE 2 DIABETES MELLITUS WITH HYPERGLYCEMIA (HCC): Primary | ICD-10-CM

## 2021-03-19 ENCOUNTER — OFFICE VISIT (OUTPATIENT)
Dept: SURGERY | Facility: CLINIC | Age: 36
End: 2021-03-19

## 2021-03-19 VITALS — BODY MASS INDEX: 38.27 KG/M2 | HEIGHT: 57.2 IN | WEIGHT: 177.38 LBS

## 2021-03-19 DIAGNOSIS — E66.01 CLASS 2 SEVERE OBESITY DUE TO EXCESS CALORIES WITH SERIOUS COMORBIDITY AND BODY MASS INDEX (BMI) OF 38.0 TO 38.9 IN ADULT (HCC): ICD-10-CM

## 2021-03-19 DIAGNOSIS — E11.65 UNCONTROLLED TYPE 2 DIABETES MELLITUS WITH HYPERGLYCEMIA (HCC): Primary | ICD-10-CM

## 2021-03-19 PROCEDURE — 97803 MED NUTRITION INDIV SUBSEQ: CPT | Performed by: DIETITIAN, REGISTERED

## 2021-03-19 PROCEDURE — 3008F BODY MASS INDEX DOCD: CPT | Performed by: DIETITIAN, REGISTERED

## 2021-03-19 NOTE — PROGRESS NOTES
[facility]  [address] [place] [address]  Dept: [phone]  Loc: [phone]    03/19/21      Bariatric Follow-up Nutrition Session    Jerardo Marinelli is a 38year old female.      Assess (H) 01/08/2021        Vitamins/Minerals:  Lab Results   Component Value Date    B12 251 01/08/2021     Lab Results   Component Value Date    VITD 25.2 (L) 05/01/2020     Lab Results   Component Value Date/Time    THIAMINE 127 01/08/2021 08:19 AM      No re AM Snack       Lunch     PM Snack     Dinner   Smoothie - 1/2 banana, 1/2 cup mixed berries, 1 scoop protein powder, collagen, 1 cup almond milk, spinach Keto Granola + [place] Yogurt (2 Tbsp) or nuts Salad + Grilled Chicken (1.5 tenderloin) Celery + PB or c is becoming irritating. All questions answered. Pt agreeable to refocus attention on protein intake. Will follow up at ~9 mo post-op or sooner if needed.      Nutrition Diagnosis     Nutrition Diagnosis: Morbid obesity: Improvement shown     Intervention

## 2021-04-16 ENCOUNTER — TELEPHONE (OUTPATIENT)
Dept: INTERNAL MEDICINE CLINIC | Facility: CLINIC | Age: 36
End: 2021-04-16

## 2021-04-16 DIAGNOSIS — E11.65 UNCONTROLLED TYPE 2 DIABETES MELLITUS WITH HYPERGLYCEMIA (HCC): Primary | ICD-10-CM

## 2021-04-20 NOTE — TELEPHONE ENCOUNTER
Renaldo message viewed by patient.      From   Starla Cook To   Radha Mcneil and Delivered   4/19/2021 11:39 AM   Last Read in [address]   4/19/2021 12:45 PM by Mel Torres

## 2021-04-24 ENCOUNTER — LAB ENCOUNTER (OUTPATIENT)
Dept: LAB | Facility: HOSPITAL | Age: 36
End: 2021-04-24
Attending: INTERNAL MEDICINE
Payer: COMMERCIAL

## 2021-04-24 DIAGNOSIS — E11.65 UNCONTROLLED TYPE 2 DIABETES MELLITUS WITH HYPERGLYCEMIA (HCC): ICD-10-CM

## 2021-04-24 PROCEDURE — 83036 HEMOGLOBIN GLYCOSYLATED A1C: CPT

## 2021-04-24 PROCEDURE — 3044F HG A1C LEVEL LT 7.0%: CPT | Performed by: NURSE PRACTITIONER

## 2021-04-24 PROCEDURE — 80061 LIPID PANEL: CPT

## 2021-04-24 PROCEDURE — 36415 COLL VENOUS BLD VENIPUNCTURE: CPT

## 2021-05-25 ENCOUNTER — OFFICE VISIT (OUTPATIENT)
Dept: SURGERY | Facility: CLINIC | Age: 36
End: 2021-05-25

## 2021-05-25 VITALS
OXYGEN SATURATION: 98 % | DIASTOLIC BLOOD PRESSURE: 69 MMHG | WEIGHT: 176.69 LBS | SYSTOLIC BLOOD PRESSURE: 103 MMHG | HEART RATE: 76 BPM | HEIGHT: 57.2 IN | BODY MASS INDEX: 38.12 KG/M2

## 2021-05-25 DIAGNOSIS — Z98.84 S/P GASTRIC BYPASS: ICD-10-CM

## 2021-05-25 DIAGNOSIS — E11.65 UNCONTROLLED TYPE 2 DIABETES MELLITUS WITH HYPERGLYCEMIA (HCC): Primary | ICD-10-CM

## 2021-05-25 DIAGNOSIS — E66.9 OBESITY (BMI 30-39.9): ICD-10-CM

## 2021-05-25 PROCEDURE — 3008F BODY MASS INDEX DOCD: CPT | Performed by: INTERNAL MEDICINE

## 2021-05-25 PROCEDURE — 3074F SYST BP LT 130 MM HG: CPT | Performed by: INTERNAL MEDICINE

## 2021-05-25 PROCEDURE — 3078F DIAST BP <80 MM HG: CPT | Performed by: INTERNAL MEDICINE

## 2021-05-25 PROCEDURE — 99214 OFFICE O/P EST MOD 30 MIN: CPT | Performed by: INTERNAL MEDICINE

## 2021-05-25 NOTE — PROGRESS NOTES
[address] [place], [place]  [address]  [facility] [address]  Dept: [phone]    Patient:  Valerie Gardner  :      1985  MRN:      [id number]    Referring Provider: Thom Vega.  Tony Sepulveda, each, Rfl: 3  •  Multiple Vitamin (MULTIVITAMINS OR), Take by mouth.  Bariatric vitamins , Disp: , Rfl:   •  Pantoprazole Sodium 40 MG Oral Tab EC, Take 1 tablet (40 mg total) by mouth every morning before breakfast. (Patient not taking: Reported on 12/17/2 soft, obese non tender.    Extremities: extremities normal, atraumatic, no cyanosis or edema  Pulses: 2+ and symmetric  Neurologic: Grossly normal       ROS:    Constitutional: negative  Respiratory: negative  Cardiovascular: negative  Gastrointestinal: pos

## 2021-09-14 ENCOUNTER — TELEPHONE (OUTPATIENT)
Dept: INTERNAL MEDICINE CLINIC | Facility: CLINIC | Age: 36
End: 2021-09-14

## 2021-09-14 NOTE — TELEPHONE ENCOUNTER
Darius from [facility] called to follow up on referral status for diabetic supplies. She states that on 9/7/21 a request was faxed to [phone]. I have asked her to re-fax it as I don't see that we received it.      Clinical staff please follow up on this

## 2021-09-29 ENCOUNTER — TELEPHONE (OUTPATIENT)
Dept: ADMINISTRATIVE | Age: 36
End: 2021-09-29

## 2021-09-29 DIAGNOSIS — E11.65 UNCONTROLLED TYPE 2 DIABETES MELLITUS WITH HYPERGLYCEMIA (HCC): Primary | ICD-10-CM

## 2021-09-29 NOTE — TELEPHONE ENCOUNTER
Dr. Fide Wheatley,    The patient called requesting referral for DME. Pended referral please review diagnosis and sign off if you agree. Thank you.   Tony Bañuelos

## 2021-10-03 NOTE — TELEPHONE ENCOUNTER
See prior notes regarding this. We will put the referral in. But had an issue before which was not covered. Really she should be on insulin up to 3 times a day and should be checking 3-4 times a day for this to be covered.   Just to inform patient may no

## 2021-10-05 NOTE — TELEPHONE ENCOUNTER
Krunal from [address] [place] [address]  is calling to check status of authorization.  Please call back.    [phone]

## 2021-10-07 ENCOUNTER — TELEPHONE (OUTPATIENT)
Dept: SURGERY | Facility: CLINIC | Age: 36
End: 2021-10-07

## 2021-10-22 ENCOUNTER — OFFICE VISIT (OUTPATIENT)
Dept: INTERNAL MEDICINE CLINIC | Facility: CLINIC | Age: 36
End: 2021-10-22

## 2021-10-22 VITALS
HEIGHT: 57 IN | WEIGHT: 166.81 LBS | BODY MASS INDEX: 35.99 KG/M2 | SYSTOLIC BLOOD PRESSURE: 98 MMHG | HEART RATE: 76 BPM | DIASTOLIC BLOOD PRESSURE: 70 MMHG | OXYGEN SATURATION: 99 %

## 2021-10-22 DIAGNOSIS — G89.29 CHRONIC MIDLINE LOW BACK PAIN WITHOUT SCIATICA: ICD-10-CM

## 2021-10-22 DIAGNOSIS — Z00.00 VISIT FOR ANNUAL HEALTH EXAMINATION: Primary | ICD-10-CM

## 2021-10-22 DIAGNOSIS — L81.1 MELASMA: ICD-10-CM

## 2021-10-22 DIAGNOSIS — E11.65 UNCONTROLLED TYPE 2 DIABETES MELLITUS WITH HYPERGLYCEMIA (HCC): ICD-10-CM

## 2021-10-22 DIAGNOSIS — R22.41 SKIN LUMP OF LEG, RIGHT: ICD-10-CM

## 2021-10-22 DIAGNOSIS — Z12.4 CERVICAL CANCER SCREENING: ICD-10-CM

## 2021-10-22 DIAGNOSIS — E78.00 HIGH CHOLESTEROL: ICD-10-CM

## 2021-10-22 DIAGNOSIS — E66.9 OBESITY (BMI 30-39.9): ICD-10-CM

## 2021-10-22 DIAGNOSIS — Z98.84 S/P GASTRIC BYPASS: ICD-10-CM

## 2021-10-22 DIAGNOSIS — M54.50 CHRONIC MIDLINE LOW BACK PAIN WITHOUT SCIATICA: ICD-10-CM

## 2021-10-22 DIAGNOSIS — E11.3293 MILD NONPROLIFERATIVE DIABETIC RETINOPATHY OF BOTH EYES WITHOUT MACULAR EDEMA ASSOCIATED WITH TYPE 2 DIABETES MELLITUS (HCC): ICD-10-CM

## 2021-10-22 DIAGNOSIS — I10 ESSENTIAL HYPERTENSION: ICD-10-CM

## 2021-10-22 DIAGNOSIS — E78.2 HYPERCHOLESTEROLEMIA WITH HYPERTRIGLYCERIDEMIA: ICD-10-CM

## 2021-10-22 PROBLEM — E11.9 DIABETES MELLITUS, TYPE 2 (HCC): Chronic | Status: RESOLVED | Noted: 2020-09-14 | Resolved: 2021-10-22

## 2021-10-22 PROBLEM — E66.813 CLASS 3 SEVERE OBESITY DUE TO EXCESS CALORIES WITH SERIOUS COMORBIDITY AND BODY MASS INDEX (BMI) OF 40.0 TO 44.9 IN ADULT (HCC): Status: RESOLVED | Noted: 2020-09-14 | Resolved: 2021-10-22

## 2021-10-22 PROBLEM — S90.415A ABRASION OF TOE OF LEFT FOOT: Status: RESOLVED | Noted: 2020-10-30 | Resolved: 2021-10-22

## 2021-10-22 PROBLEM — E66.01 MORBID OBESITY WITH BMI OF 45.0-49.9, ADULT (HCC): Status: RESOLVED | Noted: 2020-02-13 | Resolved: 2021-10-22

## 2021-10-22 PROBLEM — E66.813 CLASS 3 SEVERE OBESITY DUE TO EXCESS CALORIES WITH SERIOUS COMORBIDITY AND BODY MASS INDEX (BMI) OF 40.0 TO 44.9 IN ADULT: Status: RESOLVED | Noted: 2020-09-14 | Resolved: 2021-10-22

## 2021-10-22 PROBLEM — E66.01 CLASS 3 SEVERE OBESITY DUE TO EXCESS CALORIES WITH SERIOUS COMORBIDITY AND BODY MASS INDEX (BMI) OF 40.0 TO 44.9 IN ADULT (HCC): Status: RESOLVED | Noted: 2020-09-14 | Resolved: 2021-10-22

## 2021-10-22 PROCEDURE — 3051F HG A1C>EQUAL 7.0%<8.0%: CPT | Performed by: NURSE PRACTITIONER

## 2021-10-22 PROCEDURE — 81002 URINALYSIS NONAUTO W/O SCOPE: CPT | Performed by: NURSE PRACTITIONER

## 2021-10-22 PROCEDURE — 3008F BODY MASS INDEX DOCD: CPT | Performed by: NURSE PRACTITIONER

## 2021-10-22 PROCEDURE — 3074F SYST BP LT 130 MM HG: CPT | Performed by: NURSE PRACTITIONER

## 2021-10-22 PROCEDURE — 99213 OFFICE O/P EST LOW 20 MIN: CPT | Performed by: NURSE PRACTITIONER

## 2021-10-22 PROCEDURE — 90471 IMMUNIZATION ADMIN: CPT | Performed by: NURSE PRACTITIONER

## 2021-10-22 PROCEDURE — 36415 COLL VENOUS BLD VENIPUNCTURE: CPT | Performed by: NURSE PRACTITIONER

## 2021-10-22 PROCEDURE — 90686 IIV4 VACC NO PRSV 0.5 ML IM: CPT | Performed by: NURSE PRACTITIONER

## 2021-10-22 PROCEDURE — 3078F DIAST BP <80 MM HG: CPT | Performed by: NURSE PRACTITIONER

## 2021-10-22 PROCEDURE — 99395 PREV VISIT EST AGE 18-39: CPT | Performed by: NURSE PRACTITIONER

## 2021-10-22 RX ORDER — LANCETS 30 GAUGE
EACH MISCELLANEOUS
Qty: 50 EACH | Refills: 2 | Status: SHIPPED | OUTPATIENT
Start: 2021-10-22

## 2021-10-22 RX ORDER — BLOOD-GLUCOSE METER
EACH MISCELLANEOUS
Qty: 1 KIT | Refills: 0 | Status: SHIPPED | OUTPATIENT
Start: 2021-10-22

## 2021-10-22 RX ORDER — TIZANIDINE 4 MG/1
4 TABLET ORAL NIGHTLY
Qty: 5 TABLET | Refills: 0 | Status: SHIPPED | OUTPATIENT
Start: 2021-10-22 | End: 2021-10-27

## 2021-10-22 NOTE — PROGRESS NOTES
HPI:    Patient ID: Emily Morton is a 39year old female.     Emily Morton is a 39year old female who presents for a complete physical exam.   HPI:   Patient presents with:  Physical: Pt states here for px       Patient's last menstrual period was 10/13/20 No recommendations at this time    Current Outpatient Medications   Medication Sig Dispense Refill   • mupirocin 2 % External Ointment Apply 1 Application topically 3 (three) times daily.  1 g 0   • tiZANidine 4 MG Oral Tab Take 1 tablet (4 mg total Laterality Date   • LAP GASTRIC BYPASS/OPAL-EN-Y  09/14/2020    Dr Natali Mancia, [facility]      Family History   Problem Relation Age of Onset   • Hypertension Father    • Diabetes Father    • Diabetes Mother    • Other (EToHism. ) Maternal Grandmother - - - -   Last Eye Exam - - - - -   Eye Doctor Name - - - - -   Eye Exam Location - - - - -   Eye Exam Retinopathy - - - - -   Comments - - - - -   PHQ2 Score - - - - -   PHQ2 Score - 0 - - -   PHQ2 Score (Depression/Suicide Screening) - - - - -   PHQ4 Sco Has c/o of lump on R leg x 2 months. Has c/o LBP x years. Had 2 prior falls, xray and MRI. No PT.  10/2019  Impression CONCLUSION: Unremarkable MRI of the sacrum.       Mass  This is a new problem. The current episode started more than 1 month ago.  Alma White Gastrointestinal: Negative. Negative for abdominal pain, anorexia, bowel incontinence, change in bowel habit, nausea and vomiting. Endocrine: Negative. Genitourinary: Negative. Negative for bladder incontinence, dysuria and pelvic pain.    Musculos History:   Diagnosis Date   • Abrasion of toe of left foot 10/30/2020   • Class 3 severe obesity due to excess calories with serious comorbidity and body mass index (BMI) of 40.0 to 44.9 in adult [facility]) 9/14/2020   • Diabetes ([facility] [address]    • Diabetes mellitus, t (80.5 kg)      Physical Exam  Constitutional:       Appearance: Normal appearance. She is well-developed. HENT:      Head: Normocephalic.       Right Ear: Hearing, tympanic membrane, ear canal and external ear normal.      Left Ear: Hearing, tympanic memb submandibular, tonsillar, preauricular or posterior auricular adenopathy. Left side of head: No submental, submandibular, tonsillar, preauricular or posterior auricular adenopathy. Cervical: No cervical adenopathy.       Upper Body:      Right upp of both eyes without macular edema associated with type 2 diabetes mellitus (hcc)    Obesity (bmi 30-39. 9)  Follow up with Dr. Leo Chaudhry    S/p gastric bypass  Follow up with Dr. Leo Chaudhry    Visit for annual health examination  (primary encounter diagnosis)  Ch

## 2021-10-22 NOTE — PATIENT INSTRUCTIONS
ASSESSMENT/PLAN:   Essential hypertension  Careful with diet and excercise at least 30 minutes 3-4 times a week. Check blood pressures at different times on different days. Can purchase own blood pressure monitor. If not, check at local pharmacy.  Valerie de anda Free T4      Hemoglobin A1C      Flulaval 6 months and older 0.5 ml PFS [57983]      ThinPrep PAP with HPV Reflex Request B    Follow up in 6 months or sooner if needed  Meds This Visit:  Requested Prescriptions     Signed Prescriptions Disp Refills   • mu

## 2021-10-25 NOTE — TELEPHONE ENCOUNTER
Good Morning Dr Satnam Benitez and staff,    Referral is still pending review with [facility]. Unfortunately, they are running about 3-4 weeks behind for referrals at this time.     Thank you,    KUMAR HOWELL

## 2021-10-26 ENCOUNTER — TELEPHONE (OUTPATIENT)
Dept: CASE MANAGEMENT | Age: 36
End: 2021-10-26

## 2021-10-26 DIAGNOSIS — E11.65 UNCONTROLLED TYPE 2 DIABETES MELLITUS WITH HYPERGLYCEMIA (HCC): Primary | ICD-10-CM

## 2021-11-01 ENCOUNTER — TELEPHONE (OUTPATIENT)
Dept: INTERNAL MEDICINE CLINIC | Facility: CLINIC | Age: 36
End: 2021-11-01

## 2021-11-01 ENCOUNTER — PATIENT MESSAGE (OUTPATIENT)
Dept: INTERNAL MEDICINE CLINIC | Facility: CLINIC | Age: 36
End: 2021-11-01

## 2021-11-01 NOTE — TELEPHONE ENCOUNTER
From: Sadie Wood  To: FADY Engel  Sent: 11/1/2021 3:47 PM CDT  Subject: Question regarding HPV DNA HIGH RISK , THIN PREP    This is all new to me so I’m not sure what I should be doing next. Is there something I need to do?

## 2021-11-02 ENCOUNTER — LABORATORY ENCOUNTER (OUTPATIENT)
Dept: LAB | Age: 36
End: 2021-11-02
Attending: INTERNAL MEDICINE
Payer: COMMERCIAL

## 2021-11-02 DIAGNOSIS — E11.65 UNCONTROLLED TYPE 2 DIABETES MELLITUS WITH HYPERGLYCEMIA (HCC): ICD-10-CM

## 2021-11-02 PROCEDURE — 82043 UR ALBUMIN QUANTITATIVE: CPT

## 2021-11-02 PROCEDURE — 82570 ASSAY OF URINE CREATININE: CPT

## 2021-12-06 ENCOUNTER — MED REC SCAN ONLY (OUTPATIENT)
Dept: INTERNAL MEDICINE CLINIC | Facility: CLINIC | Age: 36
End: 2021-12-06

## 2022-02-03 ENCOUNTER — OFFICE VISIT (OUTPATIENT)
Dept: SURGERY | Facility: CLINIC | Age: 37
End: 2022-02-03
Payer: COMMERCIAL

## 2022-02-03 VITALS
DIASTOLIC BLOOD PRESSURE: 76 MMHG | SYSTOLIC BLOOD PRESSURE: 130 MMHG | BODY MASS INDEX: 36.18 KG/M2 | WEIGHT: 167.69 LBS | HEIGHT: 57.2 IN | HEART RATE: 87 BPM | OXYGEN SATURATION: 98 %

## 2022-02-03 DIAGNOSIS — L30.4 INTERTRIGO: ICD-10-CM

## 2022-02-03 DIAGNOSIS — E44.1 MILD PROTEIN-CALORIE MALNUTRITION (HCC): ICD-10-CM

## 2022-02-03 DIAGNOSIS — E11.649 TYPE 2 DIABETES MELLITUS WITH HYPOGLYCEMIA WITHOUT COMA, WITHOUT LONG-TERM CURRENT USE OF INSULIN (HCC): Primary | ICD-10-CM

## 2022-02-03 DIAGNOSIS — E55.9 VITAMIN D DEFICIENCY: ICD-10-CM

## 2022-02-03 DIAGNOSIS — E66.9 OBESITY (BMI 30-39.9): ICD-10-CM

## 2022-02-03 DIAGNOSIS — Z98.84 S/P GASTRIC BYPASS: ICD-10-CM

## 2022-02-03 PROBLEM — E66.01 MORBID (SEVERE) OBESITY DUE TO EXCESS CALORIES (HCC): Status: ACTIVE | Noted: 2022-02-03

## 2022-02-03 PROCEDURE — 3008F BODY MASS INDEX DOCD: CPT | Performed by: INTERNAL MEDICINE

## 2022-02-03 PROCEDURE — 99214 OFFICE O/P EST MOD 30 MIN: CPT | Performed by: INTERNAL MEDICINE

## 2022-02-03 PROCEDURE — 3078F DIAST BP <80 MM HG: CPT | Performed by: INTERNAL MEDICINE

## 2022-02-03 PROCEDURE — 3075F SYST BP GE 130 - 139MM HG: CPT | Performed by: INTERNAL MEDICINE

## 2022-02-03 RX ORDER — NYSTATIN 100000 [USP'U]/G
1 POWDER TOPICAL 4 TIMES DAILY
Qty: 30 G | Refills: 1 | Status: SHIPPED | OUTPATIENT
Start: 2022-02-03

## 2022-02-03 RX ORDER — PHENTERMINE HYDROCHLORIDE 15 MG/1
15 CAPSULE ORAL EVERY MORNING
Qty: 30 CAPSULE | Refills: 2 | Status: SHIPPED | OUTPATIENT
Start: 2022-02-03

## 2022-02-08 ENCOUNTER — TELEPHONE (OUTPATIENT)
Dept: SURGERY | Facility: CLINIC | Age: 37
End: 2022-02-08

## 2022-02-17 ENCOUNTER — TELEMEDICINE (OUTPATIENT)
Dept: SURGERY | Facility: CLINIC | Age: 37
End: 2022-02-17

## 2022-02-17 DIAGNOSIS — E78.2 HYPERCHOLESTEROLEMIA WITH HYPERTRIGLYCERIDEMIA: ICD-10-CM

## 2022-02-17 DIAGNOSIS — E11.65 UNCONTROLLED TYPE 2 DIABETES MELLITUS WITH HYPERGLYCEMIA (HCC): ICD-10-CM

## 2022-02-17 DIAGNOSIS — Z98.84 S/P GASTRIC BYPASS: ICD-10-CM

## 2022-02-17 DIAGNOSIS — I10 ESSENTIAL HYPERTENSION: Primary | ICD-10-CM

## 2022-02-17 DIAGNOSIS — E66.9 OBESITY (BMI 30-39.9): ICD-10-CM

## 2022-02-17 DIAGNOSIS — E11.649 TYPE 2 DIABETES MELLITUS WITH HYPOGLYCEMIA WITHOUT COMA, WITHOUT LONG-TERM CURRENT USE OF INSULIN (HCC): ICD-10-CM

## 2022-02-17 DIAGNOSIS — E44.1 MILD PROTEIN-CALORIE MALNUTRITION (HCC): ICD-10-CM

## 2022-02-17 DIAGNOSIS — E66.01 MORBID (SEVERE) OBESITY DUE TO EXCESS CALORIES (HCC): ICD-10-CM

## 2022-02-17 PROCEDURE — 3008F BODY MASS INDEX DOCD: CPT

## 2022-02-17 PROCEDURE — 97803 MED NUTRITION INDIV SUBSEQ: CPT

## 2022-04-15 VITALS — WEIGHT: 167 LBS | HEIGHT: 57.2 IN | BODY MASS INDEX: 36.03 KG/M2

## 2022-04-18 ENCOUNTER — TELEPHONE (OUTPATIENT)
Dept: INTERNAL MEDICINE CLINIC | Facility: CLINIC | Age: 37
End: 2022-04-18

## 2022-04-18 ENCOUNTER — MED REC SCAN ONLY (OUTPATIENT)
Dept: INTERNAL MEDICINE CLINIC | Facility: CLINIC | Age: 37
End: 2022-04-18

## 2022-04-19 ENCOUNTER — TELEPHONE (OUTPATIENT)
Dept: INTERNAL MEDICINE CLINIC | Facility: CLINIC | Age: 37
End: 2022-04-19

## 2022-04-19 NOTE — TELEPHONE ENCOUNTER
Per Patient she want for us to send to [facility] supplies @ [phone]. Sent to [employer].

## 2022-04-19 NOTE — TELEPHONE ENCOUNTER
I called patient to see which Pharmacy she wanted me to send DME order for Dexcome G6 sensor because she has 3 on her chart. She told me she usually get it from [facility] fax . I sent DME to [address] [place] [address].       Patient also needs a refill for Metformin to please send it to [facility] in [place]

## 2022-05-05 ENCOUNTER — OFFICE VISIT (OUTPATIENT)
Dept: SURGERY | Facility: CLINIC | Age: 37
End: 2022-05-05
Payer: COMMERCIAL

## 2022-05-05 VITALS
SYSTOLIC BLOOD PRESSURE: 123 MMHG | WEIGHT: 164.69 LBS | OXYGEN SATURATION: 98 % | BODY MASS INDEX: 35.53 KG/M2 | HEART RATE: 83 BPM | DIASTOLIC BLOOD PRESSURE: 82 MMHG | HEIGHT: 57.2 IN

## 2022-05-05 DIAGNOSIS — L30.4 INTERTRIGO: ICD-10-CM

## 2022-05-05 DIAGNOSIS — I10 ESSENTIAL HYPERTENSION: ICD-10-CM

## 2022-05-05 DIAGNOSIS — E11.649 TYPE 2 DIABETES MELLITUS WITH HYPOGLYCEMIA WITHOUT COMA, WITHOUT LONG-TERM CURRENT USE OF INSULIN (HCC): Primary | ICD-10-CM

## 2022-05-05 DIAGNOSIS — E78.2 HYPERCHOLESTEROLEMIA WITH HYPERTRIGLYCERIDEMIA: ICD-10-CM

## 2022-05-05 DIAGNOSIS — Z98.84 S/P GASTRIC BYPASS: ICD-10-CM

## 2022-05-05 DIAGNOSIS — E66.9 OBESITY (BMI 30-39.9): ICD-10-CM

## 2022-05-05 PROCEDURE — 99214 OFFICE O/P EST MOD 30 MIN: CPT | Performed by: INTERNAL MEDICINE

## 2022-05-05 PROCEDURE — 3074F SYST BP LT 130 MM HG: CPT | Performed by: INTERNAL MEDICINE

## 2022-05-05 PROCEDURE — 3079F DIAST BP 80-89 MM HG: CPT | Performed by: INTERNAL MEDICINE

## 2022-05-05 PROCEDURE — 3008F BODY MASS INDEX DOCD: CPT | Performed by: INTERNAL MEDICINE

## 2022-05-05 RX ORDER — PHENTERMINE HYDROCHLORIDE 15 MG/1
15 CAPSULE ORAL EVERY MORNING
Qty: 30 CAPSULE | Refills: 2 | Status: SHIPPED | OUTPATIENT
Start: 2022-05-05

## 2022-05-07 ENCOUNTER — LAB ENCOUNTER (OUTPATIENT)
Dept: LAB | Facility: HOSPITAL | Age: 37
End: 2022-05-07
Attending: NURSE PRACTITIONER
Payer: COMMERCIAL

## 2022-05-07 DIAGNOSIS — E11.65 UNCONTROLLED TYPE 2 DIABETES MELLITUS WITH HYPERGLYCEMIA (HCC): ICD-10-CM

## 2022-05-07 DIAGNOSIS — E44.1 MILD PROTEIN-CALORIE MALNUTRITION (HCC): ICD-10-CM

## 2022-05-07 DIAGNOSIS — Z98.84 S/P GASTRIC BYPASS: ICD-10-CM

## 2022-05-07 DIAGNOSIS — E78.00 HIGH CHOLESTEROL: ICD-10-CM

## 2022-05-07 DIAGNOSIS — E55.9 VITAMIN D DEFICIENCY: ICD-10-CM

## 2022-05-07 DIAGNOSIS — E11.649 TYPE 2 DIABETES MELLITUS WITH HYPOGLYCEMIA WITHOUT COMA, WITHOUT LONG-TERM CURRENT USE OF INSULIN (HCC): ICD-10-CM

## 2022-05-07 DIAGNOSIS — E66.9 OBESITY (BMI 30-39.9): ICD-10-CM

## 2022-05-07 LAB
CHOLEST SERPL-MCNC: 191 MG/DL (ref ?–200)
EST. AVERAGE GLUCOSE BLD GHB EST-MCNC: 137 MG/DL (ref 68–126)
FASTING PATIENT LIPID ANSWER: YES
HBA1C MFR BLD: 6.4 % (ref ?–5.7)
HDLC SERPL-MCNC: 64 MG/DL (ref 40–59)
LDLC SERPL CALC-MCNC: 115 MG/DL (ref ?–100)
NONHDLC SERPL-MCNC: 127 MG/DL (ref ?–130)
TRIGL SERPL-MCNC: 64 MG/DL (ref 30–149)
VIT B12 SERPL-MCNC: >2000 PG/ML (ref 193–986)
VIT D+METAB SERPL-MCNC: 44 NG/ML (ref 30–100)
VLDLC SERPL CALC-MCNC: 11 MG/DL (ref 0–30)

## 2022-05-07 PROCEDURE — 80061 LIPID PANEL: CPT

## 2022-05-07 PROCEDURE — 83036 HEMOGLOBIN GLYCOSYLATED A1C: CPT

## 2022-05-07 PROCEDURE — 36415 COLL VENOUS BLD VENIPUNCTURE: CPT

## 2022-05-07 PROCEDURE — 84425 ASSAY OF VITAMIN B-1: CPT

## 2022-05-07 PROCEDURE — 93010 ELECTROCARDIOGRAM REPORT: CPT | Performed by: INTERNAL MEDICINE

## 2022-05-07 PROCEDURE — 82306 VITAMIN D 25 HYDROXY: CPT

## 2022-05-07 PROCEDURE — 82607 VITAMIN B-12: CPT

## 2022-05-07 PROCEDURE — 93005 ELECTROCARDIOGRAM TRACING: CPT

## 2022-05-07 PROCEDURE — 3044F HG A1C LEVEL LT 7.0%: CPT | Performed by: NURSE PRACTITIONER

## 2022-05-12 ENCOUNTER — OFFICE VISIT (OUTPATIENT)
Dept: SURGERY | Facility: CLINIC | Age: 37
End: 2022-05-12
Payer: COMMERCIAL

## 2022-05-12 VITALS — WEIGHT: 165 LBS | BODY MASS INDEX: 34.63 KG/M2 | HEIGHT: 58 IN

## 2022-05-12 DIAGNOSIS — E66.9 OBESITY (BMI 30-39.9): Primary | ICD-10-CM

## 2022-05-12 LAB — VITAMIN B1 (THIAMINE), WHOLE B: 146 NMOL/L

## 2022-09-16 ENCOUNTER — OFFICE VISIT (OUTPATIENT)
Dept: INTERNAL MEDICINE CLINIC | Facility: CLINIC | Age: 37
End: 2022-09-16
Payer: COMMERCIAL

## 2022-09-16 ENCOUNTER — OFFICE VISIT (OUTPATIENT)
Dept: SURGERY | Facility: CLINIC | Age: 37
End: 2022-09-16
Payer: COMMERCIAL

## 2022-09-16 VITALS
HEIGHT: 58 IN | BODY MASS INDEX: 34.08 KG/M2 | HEART RATE: 76 BPM | WEIGHT: 162.38 LBS | OXYGEN SATURATION: 99 % | DIASTOLIC BLOOD PRESSURE: 80 MMHG | SYSTOLIC BLOOD PRESSURE: 118 MMHG

## 2022-09-16 VITALS
SYSTOLIC BLOOD PRESSURE: 142 MMHG | DIASTOLIC BLOOD PRESSURE: 82 MMHG | HEIGHT: 57.2 IN | OXYGEN SATURATION: 100 % | BODY MASS INDEX: 35.14 KG/M2 | HEART RATE: 116 BPM | WEIGHT: 162.88 LBS

## 2022-09-16 DIAGNOSIS — I10 ESSENTIAL HYPERTENSION: ICD-10-CM

## 2022-09-16 DIAGNOSIS — E11.3293 MILD NONPROLIFERATIVE DIABETIC RETINOPATHY OF BOTH EYES WITHOUT MACULAR EDEMA ASSOCIATED WITH TYPE 2 DIABETES MELLITUS (HCC): ICD-10-CM

## 2022-09-16 DIAGNOSIS — M54.50 CHRONIC MIDLINE LOW BACK PAIN WITHOUT SCIATICA: ICD-10-CM

## 2022-09-16 DIAGNOSIS — Z98.84 S/P GASTRIC BYPASS: ICD-10-CM

## 2022-09-16 DIAGNOSIS — E66.9 OBESITY (BMI 30-39.9): ICD-10-CM

## 2022-09-16 DIAGNOSIS — R87.618 PAP SMEAR ABNORMALITY OF CERVIX/HUMAN PAPILLOMAVIRUS (HPV) POSITIVE: ICD-10-CM

## 2022-09-16 DIAGNOSIS — E11.649 TYPE 2 DIABETES MELLITUS WITH HYPOGLYCEMIA WITHOUT COMA, WITHOUT LONG-TERM CURRENT USE OF INSULIN (HCC): Primary | ICD-10-CM

## 2022-09-16 DIAGNOSIS — E78.2 HYPERCHOLESTEROLEMIA WITH HYPERTRIGLYCERIDEMIA: ICD-10-CM

## 2022-09-16 DIAGNOSIS — L81.1 MELASMA: ICD-10-CM

## 2022-09-16 DIAGNOSIS — L30.4 INTERTRIGO: ICD-10-CM

## 2022-09-16 DIAGNOSIS — G89.29 CHRONIC MIDLINE LOW BACK PAIN WITHOUT SCIATICA: ICD-10-CM

## 2022-09-16 DIAGNOSIS — Z00.00 VISIT FOR ANNUAL HEALTH EXAMINATION: Primary | ICD-10-CM

## 2022-09-16 DIAGNOSIS — E55.9 VITAMIN D DEFICIENCY: ICD-10-CM

## 2022-09-16 DIAGNOSIS — E44.1 MILD PROTEIN-CALORIE MALNUTRITION (HCC): ICD-10-CM

## 2022-09-16 DIAGNOSIS — E11.649 TYPE 2 DIABETES MELLITUS WITH HYPOGLYCEMIA WITHOUT COMA, WITHOUT LONG-TERM CURRENT USE OF INSULIN (HCC): ICD-10-CM

## 2022-09-16 PROBLEM — E66.01 MORBID (SEVERE) OBESITY DUE TO EXCESS CALORIES (HCC): Status: RESOLVED | Noted: 2022-02-03 | Resolved: 2022-09-16

## 2022-09-16 PROBLEM — E11.65 UNCONTROLLED TYPE 2 DIABETES MELLITUS WITH HYPERGLYCEMIA (HCC): Status: RESOLVED | Noted: 2018-06-08 | Resolved: 2022-09-16

## 2022-09-16 LAB
ALBUMIN SERPL-MCNC: 4.2 G/DL (ref 3.4–5)
ALBUMIN/GLOB SERPL: 1.2 {RATIO} (ref 1–2)
ALP LIVER SERPL-CCNC: 52 U/L
ALT SERPL-CCNC: 22 U/L
ANION GAP SERPL CALC-SCNC: 7 MMOL/L (ref 0–18)
AST SERPL-CCNC: 11 U/L (ref 15–37)
BASOPHILS # BLD AUTO: 0.06 X10(3) UL (ref 0–0.2)
BASOPHILS NFR BLD AUTO: 1 %
BILIRUB SERPL-MCNC: 0.4 MG/DL (ref 0.1–2)
BILIRUBIN: NEGATIVE
BUN BLD-MCNC: 15 MG/DL (ref 7–18)
BUN/CREAT SERPL: 21.4 (ref 10–20)
CALCIUM BLD-MCNC: 9 MG/DL (ref 8.5–10.1)
CHLORIDE SERPL-SCNC: 103 MMOL/L (ref 98–112)
CHOLEST SERPL-MCNC: 216 MG/DL (ref ?–200)
CO2 SERPL-SCNC: 27 MMOL/L (ref 21–32)
CREAT BLD-MCNC: 0.7 MG/DL
DEPRECATED RDW RBC AUTO: 42 FL (ref 35.1–46.3)
EOSINOPHIL # BLD AUTO: 0.16 X10(3) UL (ref 0–0.7)
EOSINOPHIL NFR BLD AUTO: 2.6 %
ERYTHROCYTE [DISTWIDTH] IN BLOOD BY AUTOMATED COUNT: 13.2 % (ref 11–15)
EST. AVERAGE GLUCOSE BLD GHB EST-MCNC: 143 MG/DL (ref 68–126)
FASTING PATIENT LIPID ANSWER: YES
FASTING STATUS PATIENT QL REPORTED: YES
GFR SERPLBLD BASED ON 1.73 SQ M-ARVRAT: 113 ML/MIN/1.73M2 (ref 60–?)
GLOBULIN PLAS-MCNC: 3.5 G/DL (ref 2.8–4.4)
GLUCOSE (URINE DIPSTICK): NEGATIVE MG/DL
GLUCOSE BLD-MCNC: 90 MG/DL (ref 70–99)
HBA1C MFR BLD: 6.6 % (ref ?–5.7)
HCT VFR BLD AUTO: 41.7 %
HDLC SERPL-MCNC: 79 MG/DL (ref 40–59)
HGB BLD-MCNC: 12.2 G/DL
IMM GRANULOCYTES # BLD AUTO: 0.01 X10(3) UL (ref 0–1)
IMM GRANULOCYTES NFR BLD: 0.2 %
KETONES (URINE DIPSTICK): NEGATIVE MG/DL
LDLC SERPL CALC-MCNC: 122 MG/DL (ref ?–100)
LEUKOCYTES: NEGATIVE
LYMPHOCYTES # BLD AUTO: 2.72 X10(3) UL (ref 1–4)
LYMPHOCYTES NFR BLD AUTO: 44.2 %
MCH RBC QN AUTO: 25.4 PG (ref 26–34)
MCHC RBC AUTO-ENTMCNC: 29.3 G/DL (ref 31–37)
MCV RBC AUTO: 86.7 FL
MONOCYTES # BLD AUTO: 0.33 X10(3) UL (ref 0.1–1)
MONOCYTES NFR BLD AUTO: 5.4 %
MULTISTIX LOT#: ABNORMAL NUMERIC
NEUTROPHILS # BLD AUTO: 2.87 X10 (3) UL (ref 1.5–7.7)
NEUTROPHILS # BLD AUTO: 2.87 X10(3) UL (ref 1.5–7.7)
NEUTROPHILS NFR BLD AUTO: 46.6 %
NITRITE, URINE: NEGATIVE
NONHDLC SERPL-MCNC: 137 MG/DL (ref ?–130)
OCCULT BLOOD: NEGATIVE
OSMOLALITY SERPL CALC.SUM OF ELEC: 284 MOSM/KG (ref 275–295)
PH, URINE: 7 (ref 4.5–8)
PLATELET # BLD AUTO: 359 10(3)UL (ref 150–450)
POTASSIUM SERPL-SCNC: 3.9 MMOL/L (ref 3.5–5.1)
PROT SERPL-MCNC: 7.7 G/DL (ref 6.4–8.2)
PROTEIN (URINE DIPSTICK): NEGATIVE MG/DL
RBC # BLD AUTO: 4.81 X10(6)UL
SODIUM SERPL-SCNC: 137 MMOL/L (ref 136–145)
SPECIFIC GRAVITY: 1.02 (ref 1–1.03)
TRIGL SERPL-MCNC: 86 MG/DL (ref 30–149)
TSI SER-ACNC: 0.91 MIU/ML (ref 0.36–3.74)
URINE-COLOR: YELLOW
UROBILINOGEN,SEMI-QN: 0.2 MG/DL (ref 0–1.9)
VIT B12 SERPL-MCNC: 1426 PG/ML (ref 193–986)
VIT D+METAB SERPL-MCNC: 34.1 NG/ML (ref 30–100)
VLDLC SERPL CALC-MCNC: 15 MG/DL (ref 0–30)
WBC # BLD AUTO: 6.2 X10(3) UL (ref 4–11)

## 2022-09-16 PROCEDURE — 81003 URINALYSIS AUTO W/O SCOPE: CPT | Performed by: NURSE PRACTITIONER

## 2022-09-16 PROCEDURE — 3077F SYST BP >= 140 MM HG: CPT | Performed by: INTERNAL MEDICINE

## 2022-09-16 PROCEDURE — 3008F BODY MASS INDEX DOCD: CPT | Performed by: INTERNAL MEDICINE

## 2022-09-16 PROCEDURE — 3079F DIAST BP 80-89 MM HG: CPT | Performed by: NURSE PRACTITIONER

## 2022-09-16 PROCEDURE — 3044F HG A1C LEVEL LT 7.0%: CPT | Performed by: NURSE PRACTITIONER

## 2022-09-16 PROCEDURE — 3079F DIAST BP 80-89 MM HG: CPT | Performed by: INTERNAL MEDICINE

## 2022-09-16 PROCEDURE — 3074F SYST BP LT 130 MM HG: CPT | Performed by: NURSE PRACTITIONER

## 2022-09-16 PROCEDURE — 99214 OFFICE O/P EST MOD 30 MIN: CPT | Performed by: INTERNAL MEDICINE

## 2022-09-16 PROCEDURE — 36415 COLL VENOUS BLD VENIPUNCTURE: CPT | Performed by: NURSE PRACTITIONER

## 2022-09-16 PROCEDURE — 99395 PREV VISIT EST AGE 18-39: CPT | Performed by: NURSE PRACTITIONER

## 2022-09-16 PROCEDURE — 3008F BODY MASS INDEX DOCD: CPT | Performed by: NURSE PRACTITIONER

## 2022-09-16 RX ORDER — PHENTERMINE HYDROCHLORIDE 15 MG/1
15 CAPSULE ORAL EVERY MORNING
Qty: 30 CAPSULE | Refills: 2 | Status: SHIPPED | OUTPATIENT
Start: 2022-09-16

## 2022-09-16 NOTE — PATIENT INSTRUCTIONS
ASSESSMENT/PLAN:   Visit for annual health examination  (primary encounter diagnosis)  Check urine  Check blood    Hypercholesterolemia with hypertriglyceridemia  Check blood    Essential hypertension  Careful with diet and excercise at least 30 minutes 3-4 times a week. Check blood pressures at different times on different days. Can purchase own blood pressure monitor. If not, check at local pharmacy. Bake foods more and grill occasionally. Avoid fried foods. No salt. Use other seasonings. Type 2 diabetes mellitus with hypoglycemia without coma, without long-term current use of insulin ([facility])  Careful with diet and excercise at least 30 minutes 3-4 times a week. Check sugars at different times on different dates. Careful with low sugars. Carry something with you and check sugar if can. Can carry dylan cracker, etc. Decrease carbohydrates. But also, careful with fruits and natural sugars. One serving a day and no more than 1 handful every day. Check feet  every AM and careful with sores and ulcers on feet bilaterally. Check eyes every year with dilated eye exam.  Check sugars. 2-hour postmeal should be less than 140s. Pre-meal should be 's. Both equally affected A1c.   Discussed importance of glycemic control to prevent complications of diabetes  -Discussed complications of diabetes include retinopathy, neuropathy, nephropathy and cardiovascular disease  -Discussed ABCs of DM  -Discussed importance of SBGM  -Discussed importance of low CHO diet, recommend 45gm per meal or 135gm per day  -Normal foot exam  -UTD with optho  -Normotensive    Mild nonproliferative diabetic retinopathy of both eyes without macular edema associated with type 2 diabetes mellitus (hcc)  Follow up with eye doctor    Vitamin d deficiency  Check blood    Mild protein-calorie malnutrition (hcc)  Check blood    Chronic midline low back pain without sciatica stable    Melasma    S/p gastric bypass  Follow up with  Sravanthi    Obesity (bmi 30-39. 9)  Check blood      Pap smear abnormality of cervix/human papillomavirus (hpv) positive  Pap smear done in office    Orders Placed This Encounter      URINALYSIS, AUTO, W/O SCOPE      TSH W Reflex To Free T4      Lipid Panel      CBC With Differential With Platelet      Comp Metabolic Panel (14)      Vitamin D      Vitamin B12      Hemoglobin A1C      Vitamin D, 25-Hydroxy      ThinPrep PAP with HPV Reflex Request B    Follow up in 6 months or sooner if needed.   Meds This Visit:  Requested Prescriptions      No prescriptions requested or ordered in this encounter       Imaging & Referrals:  None

## 2022-09-23 ENCOUNTER — TELEPHONE (OUTPATIENT)
Dept: INTERNAL MEDICINE CLINIC | Facility: CLINIC | Age: 37
End: 2022-09-23

## 2022-09-23 DIAGNOSIS — E11.9 TYPE 2 DIABETES MELLITUS WITHOUT COMPLICATION, UNSPECIFIED WHETHER LONG TERM INSULIN USE (HCC): Primary | ICD-10-CM

## 2022-09-23 NOTE — TELEPHONE ENCOUNTER
Insurance generally covers urine microalbumin once a year. So patient will be due until November 2, 2022.

## 2022-09-23 NOTE — TELEPHONE ENCOUNTER
The [facility] [place] department is currently outreaching to your diabetic patients. This patient is due for a urine microalbumin. Please sign pending orders/referral and route back to me to contact patient.

## 2022-10-03 LAB — HPV I/H RISK 1 DNA SPEC QL NAA+PROBE: NEGATIVE

## 2022-10-19 NOTE — TELEPHONE ENCOUNTER
Refill passed per [facility] protocol. Requested Prescriptions   Pending Prescriptions Disp Refills    METFORMIN 500 MG Oral Tab [Pharmacy Med Name: METFORMIN 500MG TABLETS] 180 tablet 1     Sig: TAKE 1 TABLET(500 MG) BY MOUTH EVERY 12 HOURS        Diabetes Medication Protocol Passed - 10/19/2022  5:56 AM        Passed - Last A1C < 7.5 and within past 6 months     Lab Results   Component Value Date    A1C 6.6 (H) 09/16/2022               Passed - In person appointment or virtual visit in the past 6 mos or appointment in next 3 mos       Recent Outpatient Visits              1 month ago Visit for annual health examination    [facility], [address], Adwoa Rojas APRN    Office Visit    1 month ago Type 2 diabetes mellitus with hypoglycemia without coma, without long-term current use of insulin [facility])    Andrew Crooks MD    Office Visit    5 months ago Obesity (BMI 30-39. 5)    [address] [facility], [address]    Office Visit    5 months ago Type 2 diabetes mellitus with hypoglycemia without coma, without long-term current use of insulin [facility])    Andrew Crooks MD    Office Visit    8 months ago Essential hypertension    [employer], [address], [address]    Telemedicine     Future Appointments         Provider Department Appt Notes    In 2 months Azalia Berrios MD [employer], [address], Bijal Guerra HMO- needs referral  POST OP F/U    In 5 months Leatha Harry MD [facility] New Consult, Skin Removal, Ref by Dr Gael Benitez or GFRNAA > 50     GFR Evaluation  EGFRCR: 113 , resulted on 9/16/2022            Passed - GFR in the past 12 months            Recent Outpatient Visits              1 month ago Visit for annual health examination [address] [facility], [facility] [place], Adwoa, FADY    Office Visit    1 month ago Type 2 diabetes mellitus with hypoglycemia without coma, without long-term current use of insulin [facility])    Jake Mcrae MD    Office Visit    5 months ago Obesity (BMI 30-39. 5)    [address] [facility] [address]    Office Visit    5 months ago Type 2 diabetes mellitus with hypoglycemia without coma, without long-term current use of insulin [facility])    Jake Mcrae MD    Office Visit    8 months ago Essential hypertension    [address], [address], Jesus Sol, ALB[place]    Telemedicine          Future Appointments         Provider Department Appt Notes    In 2 months Alexx Lance MD [address], [address], Bijal Guerra HMO- needs referral  POST OP F/U    In 5 months Lonnell Lanes., MD [facility] New Consult, Skin Removal, Ref by Dr Yani More

## 2022-12-15 ENCOUNTER — OFFICE VISIT (OUTPATIENT)
Dept: INTERNAL MEDICINE CLINIC | Facility: CLINIC | Age: 37
End: 2022-12-15
Payer: COMMERCIAL

## 2022-12-15 ENCOUNTER — HOSPITAL ENCOUNTER (OUTPATIENT)
Dept: ULTRASOUND IMAGING | Facility: HOSPITAL | Age: 37
Discharge: HOME | End: 2022-12-15
Attending: NURSE PRACTITIONER
Payer: COMMERCIAL

## 2022-12-15 VITALS
WEIGHT: 151.81 LBS | OXYGEN SATURATION: 99 % | HEIGHT: 57 IN | DIASTOLIC BLOOD PRESSURE: 78 MMHG | HEART RATE: 88 BPM | SYSTOLIC BLOOD PRESSURE: 114 MMHG | BODY MASS INDEX: 32.75 KG/M2

## 2022-12-15 DIAGNOSIS — M79.605 PAIN IN BOTH LOWER EXTREMITIES: ICD-10-CM

## 2022-12-15 DIAGNOSIS — T14.8XXD WOUND HEALING, DELAYED: ICD-10-CM

## 2022-12-15 DIAGNOSIS — Z98.890 S/P PANNICULECTOMY: ICD-10-CM

## 2022-12-15 DIAGNOSIS — M79.604 PAIN IN BOTH LOWER EXTREMITIES: ICD-10-CM

## 2022-12-15 DIAGNOSIS — E11.649 TYPE 2 DIABETES MELLITUS WITH HYPOGLYCEMIA WITHOUT COMA, WITHOUT LONG-TERM CURRENT USE OF INSULIN (HCC): ICD-10-CM

## 2022-12-15 DIAGNOSIS — Z98.84 S/P GASTRIC BYPASS: ICD-10-CM

## 2022-12-15 DIAGNOSIS — I10 ESSENTIAL HYPERTENSION: Primary | ICD-10-CM

## 2022-12-15 DIAGNOSIS — E11.9 TYPE 2 DIABETES MELLITUS WITHOUT COMPLICATION, UNSPECIFIED WHETHER LONG TERM INSULIN USE (HCC): ICD-10-CM

## 2022-12-15 LAB
CREAT UR-SCNC: 76.4 MG/DL
MICROALBUMIN UR-MCNC: 0.63 MG/DL
MICROALBUMIN/CREAT 24H UR-RTO: 8.2 UG/MG (ref ?–30)

## 2022-12-15 PROCEDURE — 3061F NEG MICROALBUMINURIA REV: CPT | Performed by: NURSE PRACTITIONER

## 2022-12-15 PROCEDURE — 99214 OFFICE O/P EST MOD 30 MIN: CPT | Performed by: NURSE PRACTITIONER

## 2022-12-15 PROCEDURE — 3078F DIAST BP <80 MM HG: CPT | Performed by: NURSE PRACTITIONER

## 2022-12-15 PROCEDURE — 87186 SC STD MICRODIL/AGAR DIL: CPT | Performed by: NURSE PRACTITIONER

## 2022-12-15 PROCEDURE — 93970 EXTREMITY STUDY: CPT | Performed by: NURSE PRACTITIONER

## 2022-12-15 PROCEDURE — 3074F SYST BP LT 130 MM HG: CPT | Performed by: NURSE PRACTITIONER

## 2022-12-15 PROCEDURE — 3008F BODY MASS INDEX DOCD: CPT | Performed by: NURSE PRACTITIONER

## 2022-12-15 NOTE — PATIENT INSTRUCTIONS
ASSESSMENT/PLAN:   Essential hypertension  (primary encounter diagnosis)  Careful with diet and excercise at least 30 minutes 3-4 times a week. Check blood pressures at different times on different days. Can purchase own blood pressure monitor. If not, check at local pharmacy. Bake foods more and grill occasionally. Avoid fried foods. No salt. Use other seasonings. Type 2 diabetes mellitus with hypoglycemia without coma, without long-term current use of insulin (hcc)  Careful with diet and excercise at least 30 minutes 3-4 times a week. Check sugars at different times on different dates. Careful with low sugars. Carry something with you and check sugar if can. Can carry dylan cracker, etc. Decrease carbohydrates. But also, careful with fruits and natural sugars. One serving a day and no more than 1 handful every day. Check feet  every AM and careful with sores and ulcers on feet bilaterally. Check eyes every year with dilated eye exam.  Check sugars. 2-hour postmeal should be less than 140s. Pre-meal should be 's. Both equally affected A1c. Discussed importance of glycemic control to prevent complications of diabetes  -Discussed complications of diabetes include retinopathy, neuropathy, nephropathy and cardiovascular disease  -Discussed ABCs of DM  -Discussed importance of SBGM  -Discussed importance of low CHO diet, recommend 45gm per meal or 135gm per day  -Normal foot exam  -UTD with optho  -Normotensive    Pain in both lower extremities  Order stat ultrasound  Pending ultrasound results wear compression socks during the day and remove at night. Elevate legs multiple times throughout the day. S/p gastric bypass  Follow-up with Dr. Tanner Giordano referral entered    S/p panniculectomy  Ordered culture  Clean areas 2 times a day with mild soap and water allowed to dry and apply mupirocin ointment 2 times a day.   Contact office if symptoms worsen  No orders of the defined types were placed in this encounter. Follow-up in 3 months or sooner if needed. Meds This Visit:  Requested Prescriptions     Signed Prescriptions Disp Refills    mupirocin 2 % External Ointment 1 g 0     Sig: Apply 1 Application. topically 2 (two) times daily.        Imaging & Referrals:  BARIATRICS - INTERNAL  US VENOUS DOPPLER LEG BILAT - DIAG IMG (CPT=93970)

## 2022-12-18 PROCEDURE — 87186 SC STD MICRODIL/AGAR DIL: CPT | Performed by: NURSE PRACTITIONER

## 2022-12-19 ENCOUNTER — TELEPHONE (OUTPATIENT)
Dept: INTERNAL MEDICINE CLINIC | Facility: CLINIC | Age: 37
End: 2022-12-19

## 2022-12-19 NOTE — TELEPHONE ENCOUNTER
Spoke to pt regarding form dropped off in office. Pt stated form is to specify in detail on when pt can return back to work. Pt stated form has been completed by Surgeon. States she will need provider to complete form. Once done please fax to [facility] [phone].

## 2022-12-20 NOTE — TELEPHONE ENCOUNTER
Form signed and faxed to [facility] [phone]. Conf rec'v. Mailed original to pt's home address. Copy sent to scanning. Pt advised form was sent. Verbally understanding.

## 2023-01-13 ENCOUNTER — OFFICE VISIT (OUTPATIENT)
Dept: SURGERY | Facility: CLINIC | Age: 38
End: 2023-01-13
Payer: COMMERCIAL

## 2023-01-13 VITALS
SYSTOLIC BLOOD PRESSURE: 135 MMHG | OXYGEN SATURATION: 100 % | WEIGHT: 143.63 LBS | BODY MASS INDEX: 30.99 KG/M2 | HEART RATE: 109 BPM | DIASTOLIC BLOOD PRESSURE: 89 MMHG | HEIGHT: 57.2 IN

## 2023-01-13 DIAGNOSIS — Z98.84 S/P GASTRIC BYPASS: ICD-10-CM

## 2023-01-13 DIAGNOSIS — I10 ESSENTIAL HYPERTENSION: ICD-10-CM

## 2023-01-13 DIAGNOSIS — E66.9 OBESITY (BMI 30-39.9): ICD-10-CM

## 2023-01-13 DIAGNOSIS — E11.649 TYPE 2 DIABETES MELLITUS WITH HYPOGLYCEMIA WITHOUT COMA, WITHOUT LONG-TERM CURRENT USE OF INSULIN (HCC): Primary | ICD-10-CM

## 2023-01-13 DIAGNOSIS — L30.4 INTERTRIGO: ICD-10-CM

## 2023-01-13 PROCEDURE — 3008F BODY MASS INDEX DOCD: CPT | Performed by: INTERNAL MEDICINE

## 2023-01-13 PROCEDURE — 99214 OFFICE O/P EST MOD 30 MIN: CPT | Performed by: INTERNAL MEDICINE

## 2023-01-13 PROCEDURE — 3075F SYST BP GE 130 - 139MM HG: CPT | Performed by: INTERNAL MEDICINE

## 2023-01-13 PROCEDURE — 3079F DIAST BP 80-89 MM HG: CPT | Performed by: INTERNAL MEDICINE

## 2023-01-13 RX ORDER — PHENTERMINE HYDROCHLORIDE 15 MG/1
15 CAPSULE ORAL EVERY MORNING
Qty: 30 CAPSULE | Refills: 2 | Status: SHIPPED | OUTPATIENT
Start: 2023-01-13

## 2023-02-28 ENCOUNTER — OFFICE VISIT (OUTPATIENT)
Dept: INTERNAL MEDICINE CLINIC | Facility: CLINIC | Age: 38
End: 2023-02-28

## 2023-02-28 VITALS
DIASTOLIC BLOOD PRESSURE: 78 MMHG | TEMPERATURE: 97 F | RESPIRATION RATE: 14 BRPM | HEIGHT: 57.2 IN | HEART RATE: 98 BPM | WEIGHT: 142 LBS | BODY MASS INDEX: 30.63 KG/M2 | OXYGEN SATURATION: 100 % | SYSTOLIC BLOOD PRESSURE: 122 MMHG

## 2023-02-28 DIAGNOSIS — E55.9 VITAMIN D DEFICIENCY: ICD-10-CM

## 2023-02-28 DIAGNOSIS — E11.3293 MILD NONPROLIFERATIVE DIABETIC RETINOPATHY OF BOTH EYES WITHOUT MACULAR EDEMA ASSOCIATED WITH TYPE 2 DIABETES MELLITUS (HCC): ICD-10-CM

## 2023-02-28 DIAGNOSIS — I10 ESSENTIAL HYPERTENSION: Primary | ICD-10-CM

## 2023-02-28 DIAGNOSIS — Z98.84 S/P GASTRIC BYPASS: ICD-10-CM

## 2023-02-28 DIAGNOSIS — E78.2 HYPERCHOLESTEROLEMIA WITH HYPERTRIGLYCERIDEMIA: ICD-10-CM

## 2023-02-28 DIAGNOSIS — E11.649 TYPE 2 DIABETES MELLITUS WITH HYPOGLYCEMIA WITHOUT COMA, WITHOUT LONG-TERM CURRENT USE OF INSULIN (HCC): ICD-10-CM

## 2023-02-28 PROBLEM — E44.1 MILD PROTEIN-CALORIE MALNUTRITION (HCC): Status: RESOLVED | Noted: 2022-02-03 | Resolved: 2023-02-28

## 2023-02-28 PROCEDURE — 99214 OFFICE O/P EST MOD 30 MIN: CPT | Performed by: INTERNAL MEDICINE

## 2023-02-28 PROCEDURE — 3074F SYST BP LT 130 MM HG: CPT | Performed by: INTERNAL MEDICINE

## 2023-02-28 PROCEDURE — 90471 IMMUNIZATION ADMIN: CPT | Performed by: INTERNAL MEDICINE

## 2023-02-28 PROCEDURE — 90715 TDAP VACCINE 7 YRS/> IM: CPT | Performed by: INTERNAL MEDICINE

## 2023-02-28 PROCEDURE — 90686 IIV4 VACC NO PRSV 0.5 ML IM: CPT | Performed by: INTERNAL MEDICINE

## 2023-02-28 PROCEDURE — 90472 IMMUNIZATION ADMIN EACH ADD: CPT | Performed by: INTERNAL MEDICINE

## 2023-02-28 PROCEDURE — 3078F DIAST BP <80 MM HG: CPT | Performed by: INTERNAL MEDICINE

## 2023-02-28 PROCEDURE — 3008F BODY MASS INDEX DOCD: CPT | Performed by: INTERNAL MEDICINE

## 2023-02-28 RX ORDER — PANTOPRAZOLE SODIUM 40 MG/1
40 TABLET, DELAYED RELEASE ORAL AS DIRECTED
COMMUNITY

## 2023-03-22 ENCOUNTER — PATIENT MESSAGE (OUTPATIENT)
Dept: INTERNAL MEDICINE CLINIC | Facility: CLINIC | Age: 38
End: 2023-03-22

## 2023-03-23 ENCOUNTER — TELEPHONE (OUTPATIENT)
Dept: INTERNAL MEDICINE CLINIC | Facility: CLINIC | Age: 38
End: 2023-03-23

## 2023-03-23 DIAGNOSIS — E11.649 TYPE 2 DIABETES MELLITUS WITH HYPOGLYCEMIA WITHOUT COMA, WITHOUT LONG-TERM CURRENT USE OF INSULIN (HCC): Primary | ICD-10-CM

## 2023-03-23 RX ORDER — ONDANSETRON 4 MG/1
4 TABLET, FILM COATED ORAL EVERY 8 HOURS PRN
Qty: 10 TABLET | Refills: 3 | Status: SHIPPED | OUTPATIENT
Start: 2023-03-23

## 2023-03-24 ENCOUNTER — TELEPHONE (OUTPATIENT)
Dept: INTERNAL MEDICINE CLINIC | Facility: CLINIC | Age: 38
End: 2023-03-24

## 2023-03-28 NOTE — TELEPHONE ENCOUNTER
Spoke with patient and informed her that order for dexcom was sent to [facility] and is pending review, also faxed forms to [facility] as requested

## 2023-06-02 ENCOUNTER — TELEPHONE (OUTPATIENT)
Dept: INTERNAL MEDICINE CLINIC | Facility: CLINIC | Age: 38
End: 2023-06-02

## 2023-06-27 ENCOUNTER — OFFICE VISIT (OUTPATIENT)
Dept: SURGERY | Facility: CLINIC | Age: 38
End: 2023-06-27
Payer: COMMERCIAL

## 2023-06-27 VITALS
HEART RATE: 81 BPM | DIASTOLIC BLOOD PRESSURE: 80 MMHG | OXYGEN SATURATION: 94 % | BODY MASS INDEX: 31.28 KG/M2 | SYSTOLIC BLOOD PRESSURE: 132 MMHG | HEIGHT: 57.2 IN | WEIGHT: 145 LBS

## 2023-06-27 DIAGNOSIS — E11.649 TYPE 2 DIABETES MELLITUS WITH HYPOGLYCEMIA WITHOUT COMA, WITHOUT LONG-TERM CURRENT USE OF INSULIN (HCC): Primary | ICD-10-CM

## 2023-06-27 DIAGNOSIS — Z98.84 S/P GASTRIC BYPASS: ICD-10-CM

## 2023-06-27 DIAGNOSIS — I10 ESSENTIAL HYPERTENSION: ICD-10-CM

## 2023-06-27 DIAGNOSIS — E44.1 MILD PROTEIN-CALORIE MALNUTRITION (HCC): ICD-10-CM

## 2023-06-27 DIAGNOSIS — E66.9 OBESITY (BMI 30-39.9): ICD-10-CM

## 2023-06-27 PROCEDURE — 99214 OFFICE O/P EST MOD 30 MIN: CPT | Performed by: INTERNAL MEDICINE

## 2023-06-27 PROCEDURE — 3079F DIAST BP 80-89 MM HG: CPT | Performed by: INTERNAL MEDICINE

## 2023-06-27 PROCEDURE — 3075F SYST BP GE 130 - 139MM HG: CPT | Performed by: INTERNAL MEDICINE

## 2023-06-27 PROCEDURE — 3008F BODY MASS INDEX DOCD: CPT | Performed by: INTERNAL MEDICINE

## 2023-06-27 RX ORDER — PHENTERMINE HYDROCHLORIDE 15 MG/1
15 CAPSULE ORAL EVERY MORNING
Qty: 30 CAPSULE | Refills: 3 | Status: SHIPPED | OUTPATIENT
Start: 2023-06-27

## 2023-07-01 ENCOUNTER — LAB ENCOUNTER (OUTPATIENT)
Dept: LAB | Facility: HOSPITAL | Age: 38
End: 2023-07-01
Attending: INTERNAL MEDICINE
Payer: COMMERCIAL

## 2023-07-01 DIAGNOSIS — E11.3293 MILD NONPROLIFERATIVE DIABETIC RETINOPATHY OF BOTH EYES WITHOUT MACULAR EDEMA ASSOCIATED WITH TYPE 2 DIABETES MELLITUS (HCC): ICD-10-CM

## 2023-07-01 DIAGNOSIS — E11.649 TYPE 2 DIABETES MELLITUS WITH HYPOGLYCEMIA WITHOUT COMA, WITHOUT LONG-TERM CURRENT USE OF INSULIN (HCC): ICD-10-CM

## 2023-07-01 DIAGNOSIS — I10 ESSENTIAL HYPERTENSION: ICD-10-CM

## 2023-07-01 DIAGNOSIS — E66.9 OBESITY (BMI 30-39.9): ICD-10-CM

## 2023-07-01 DIAGNOSIS — Z98.84 S/P GASTRIC BYPASS: ICD-10-CM

## 2023-07-01 DIAGNOSIS — E78.2 HYPERCHOLESTEROLEMIA WITH HYPERTRIGLYCERIDEMIA: ICD-10-CM

## 2023-07-01 DIAGNOSIS — E44.1 MILD PROTEIN-CALORIE MALNUTRITION (HCC): ICD-10-CM

## 2023-07-01 LAB
ALBUMIN SERPL-MCNC: 3.6 G/DL (ref 3.4–5)
ALBUMIN/GLOB SERPL: 1 {RATIO} (ref 1–2)
ALP LIVER SERPL-CCNC: 48 U/L
ALT SERPL-CCNC: 19 U/L
ANION GAP SERPL CALC-SCNC: 4 MMOL/L (ref 0–18)
AST SERPL-CCNC: 13 U/L (ref 15–37)
ATRIAL RATE: 69 BPM
BILIRUB SERPL-MCNC: 0.3 MG/DL (ref 0.1–2)
BUN BLD-MCNC: 21 MG/DL (ref 7–18)
BUN/CREAT SERPL: 45.7 (ref 10–20)
CALCIUM BLD-MCNC: 8.7 MG/DL (ref 8.5–10.1)
CHLORIDE SERPL-SCNC: 107 MMOL/L (ref 98–112)
CHOLEST SERPL-MCNC: 186 MG/DL (ref ?–200)
CO2 SERPL-SCNC: 29 MMOL/L (ref 21–32)
CREAT BLD-MCNC: 0.46 MG/DL
EST. AVERAGE GLUCOSE BLD GHB EST-MCNC: 140 MG/DL (ref 68–126)
FASTING PATIENT LIPID ANSWER: YES
FASTING STATUS PATIENT QL REPORTED: YES
GFR SERPLBLD BASED ON 1.73 SQ M-ARVRAT: 125 ML/MIN/1.73M2 (ref 60–?)
GLOBULIN PLAS-MCNC: 3.6 G/DL (ref 2.8–4.4)
GLUCOSE BLD-MCNC: 102 MG/DL (ref 70–99)
HBA1C MFR BLD: 6.5 % (ref ?–5.7)
HDLC SERPL-MCNC: 68 MG/DL (ref 40–59)
INR BLD: 1 (ref 0.85–1.16)
LDLC SERPL CALC-MCNC: 105 MG/DL (ref ?–100)
NONHDLC SERPL-MCNC: 118 MG/DL (ref ?–130)
OSMOLALITY SERPL CALC.SUM OF ELEC: 293 MOSM/KG (ref 275–295)
P AXIS: -13 DEGREES
P-R INTERVAL: 168 MS
POTASSIUM SERPL-SCNC: 4.2 MMOL/L (ref 3.5–5.1)
PROT SERPL-MCNC: 7.2 G/DL (ref 6.4–8.2)
PROTHROMBIN TIME: 13.1 SECONDS (ref 11.6–14.8)
Q-T INTERVAL: 376 MS
QRS DURATION: 74 MS
QTC CALCULATION (BEZET): 402 MS
R AXIS: -14 DEGREES
SODIUM SERPL-SCNC: 140 MMOL/L (ref 136–145)
T AXIS: -18 DEGREES
TRIGL SERPL-MCNC: 71 MG/DL (ref 30–149)
VENTRICULAR RATE: 69 BPM
VIT B12 SERPL-MCNC: 937 PG/ML (ref 193–986)
VLDLC SERPL CALC-MCNC: 12 MG/DL (ref 0–30)

## 2023-07-01 PROCEDURE — 85610 PROTHROMBIN TIME: CPT

## 2023-07-01 PROCEDURE — 82607 VITAMIN B-12: CPT

## 2023-07-01 PROCEDURE — 80053 COMPREHEN METABOLIC PANEL: CPT

## 2023-07-01 PROCEDURE — 3044F HG A1C LEVEL LT 7.0%: CPT | Performed by: INTERNAL MEDICINE

## 2023-07-01 PROCEDURE — 93010 ELECTROCARDIOGRAM REPORT: CPT | Performed by: INTERNAL MEDICINE

## 2023-07-01 PROCEDURE — 36415 COLL VENOUS BLD VENIPUNCTURE: CPT

## 2023-07-01 PROCEDURE — 93005 ELECTROCARDIOGRAM TRACING: CPT

## 2023-07-01 PROCEDURE — 80061 LIPID PANEL: CPT

## 2023-07-01 PROCEDURE — 83036 HEMOGLOBIN GLYCOSYLATED A1C: CPT

## 2023-07-03 NOTE — PROGRESS NOTES
Patient informed that her Cholesterol levels look good  Blood sugar levels are good for diabetes control  Vitamin B12 levels are within normal limits  Kidney function and liver function look good

## 2023-08-18 ENCOUNTER — TELEPHONE (OUTPATIENT)
Dept: INTERNAL MEDICINE CLINIC | Facility: CLINIC | Age: 38
End: 2023-08-18

## 2023-08-18 NOTE — TELEPHONE ENCOUNTER
Patient is due for DM eye exam. Referral to Dr. Phoenix Sawyer has been approved. Left message to call to schedule an appointment and his contact information was provided.

## 2023-09-01 ENCOUNTER — TELEPHONE (OUTPATIENT)
Dept: INTERNAL MEDICINE CLINIC | Facility: CLINIC | Age: 38
End: 2023-09-01

## 2023-09-19 PROBLEM — Z71.85 VACCINE COUNSELING: Status: ACTIVE | Noted: 2023-09-19

## 2023-09-19 PROBLEM — E44.1 MILD PROTEIN-CALORIE MALNUTRITION (HCC): Status: RESOLVED | Noted: 2022-02-03 | Resolved: 2023-09-19

## 2023-09-19 PROBLEM — Z00.00 ADULT GENERAL MEDICAL EXAM: Status: ACTIVE | Noted: 2023-09-19

## 2023-09-19 PROBLEM — Z12.4 PAP SMEAR FOR CERVICAL CANCER SCREENING: Status: ACTIVE | Noted: 2023-09-19

## 2023-09-20 ENCOUNTER — TELEPHONE (OUTPATIENT)
Dept: INTERNAL MEDICINE CLINIC | Facility: CLINIC | Age: 38
End: 2023-09-20

## 2023-09-20 NOTE — TELEPHONE ENCOUNTER
Per patient she's so sorry that she miss her appointment yesterday, she states that she mixed up her dates. And would like to know if doctor can accommodate her to have physical sooner that 03/01/2024. Please advise.     Please reply to pool: [facility] IM FM ALG THE

## 2023-09-21 NOTE — TELEPHONE ENCOUNTER
We will see if we get an opening. Since his physical I cannot really see any openings is on longer appointments. We can put her on a wait list if we get an opening for a physical then we can get her in. With enough time.

## 2023-11-21 ENCOUNTER — TELEPHONE (OUTPATIENT)
Dept: INTERNAL MEDICINE CLINIC | Facility: CLINIC | Age: 38
End: 2023-11-21

## 2023-11-29 ENCOUNTER — OFFICE VISIT (OUTPATIENT)
Dept: INTERNAL MEDICINE CLINIC | Facility: CLINIC | Age: 38
End: 2023-11-29
Payer: COMMERCIAL

## 2023-11-29 VITALS
BODY MASS INDEX: 29.99 KG/M2 | HEART RATE: 87 BPM | SYSTOLIC BLOOD PRESSURE: 124 MMHG | HEIGHT: 57 IN | TEMPERATURE: 98 F | DIASTOLIC BLOOD PRESSURE: 75 MMHG | WEIGHT: 139 LBS

## 2023-11-29 DIAGNOSIS — M53.3 SACRAL PAIN: Primary | ICD-10-CM

## 2023-11-29 DIAGNOSIS — Z30.09 BIRTH CONTROL COUNSELING: ICD-10-CM

## 2023-11-29 PROCEDURE — 3078F DIAST BP <80 MM HG: CPT | Performed by: INTERNAL MEDICINE

## 2023-11-29 PROCEDURE — 3074F SYST BP LT 130 MM HG: CPT | Performed by: INTERNAL MEDICINE

## 2023-11-29 PROCEDURE — 3008F BODY MASS INDEX DOCD: CPT | Performed by: INTERNAL MEDICINE

## 2023-11-29 PROCEDURE — 99214 OFFICE O/P EST MOD 30 MIN: CPT | Performed by: INTERNAL MEDICINE

## 2023-11-29 RX ORDER — NORGESTIMATE AND ETHINYL ESTRADIOL 7DAYSX3 LO
1 KIT ORAL DAILY
Qty: 28 TABLET | Refills: 1 | Status: SHIPPED | OUTPATIENT
Start: 2023-11-29 | End: 2023-11-29

## 2023-11-29 RX ORDER — METHYLPREDNISOLONE 4 MG/1
TABLET ORAL
Qty: 1 EACH | Refills: 0 | Status: SHIPPED | OUTPATIENT
Start: 2023-11-29

## 2023-11-29 NOTE — PROGRESS NOTES
Subjective:     Patient ID: Marry Street is a 43year old female. Contraception    Back Pain    Shoulder Pain         History/Other: Came in today to discuss few issues. She is interested in starting birth control. She can decide a between the pills or other options but she wants to start with pills first    She also states that she has this a chronic sacral pain she had MRI in 2019 and everything was normal but she states that she continues to have pain she has hard time when she is trying to get up from the chair. She also for last 2 weeks she is having some shoulder pain which is worse with movement  Review of Systems   Constitutional: Negative. HENT: Negative. Respiratory: Negative. Cardiovascular: Negative. Genitourinary: Negative. Musculoskeletal:  Positive for back pain. Skin: Negative. Neurological: Negative. Hematological: Negative. Psychiatric/Behavioral: Negative. Current Outpatient Medications   Medication Sig Dispense Refill    metFORMIN 500 MG Oral Tab Take 1 tablet (500 mg total) by mouth Q12H. 180 tablet 1    Phentermine HCl 15 MG Oral Cap Take 1 capsule (15 mg total) by mouth every morning. 30 capsule 3    ondansetron (ZOFRAN) 4 mg tablet Take 1 tablet (4 mg total) by mouth every 8 (eight) hours as needed for Nausea. 10 tablet 3    Glucose Blood (ONETOUCH ULTRA) In Vitro Strip CHECK BLOOD SUGAR TWICE DAILY 200 strip 3    Lancets Does not apply Misc Check blood sugar twice daily. 50 each 2    Blood Glucose Monitoring Suppl (ONETOUCH ULTRA 2) w/Device Does not apply Kit Check blood sugar 2 times daily 1 kit 0    Continuous Blood Gluc  (DEXCOM G6 ) Does not apply Device 1 each by Does not apply route See Admin Instructions. 1 Device 0    Continuous Blood Gluc Sensor (DEXCOM G6 SENSOR) Does not apply Misc 1 each by Does not apply route See Admin Instructions.  9 each 3    Continuous Blood Gluc Transmit (DEXCOM G6 TRANSMITTER) Does not apply Misc 1 each by Does not apply route See Admin Instructions. 3 each 3    Multiple Vitamin (MULTIVITAMINS OR) Take by mouth. Bariatric vitamins       pantoprazole 40 MG Oral Tab EC Take 1 tablet (40 mg total) by mouth As Directed. (Patient not taking: Reported on 11/29/2023)      fluconazole 200 MG Oral Tab Take 4 tablets (800 mg total) by mouth daily. (Patient not taking: Reported on 11/29/2023) 28 tablet 0    mupirocin 2 % External Ointment Apply 1 Application. topically 2 (two) times daily. (Patient not taking: Reported on 11/29/2023) 1 g 0    Nystatin [id number] UNIT/GM External Powder Apply 1 Application topically 4 (four) times daily.  Apply to affected areas (Patient not taking: Reported on 11/29/2023) 30 g 1     Allergies:No Known Allergies    Past Medical History:   Diagnosis Date    Abrasion of toe of left foot 10/30/2020    Class 3 severe obesity due to excess calories with serious comorbidity and body mass index (BMI) of 40.0 to 44.9 in adult ([address] 9/14/2020    Diabetes ([address]     Diabetes mellitus, type 2 ([address] 9/14/2020    Essential hypertension     High blood pressure     High cholesterol     High cholesterol 1/14/2019    Hypercholesterolemia with hypertriglyceridemia     Intertrigo 2/3/2022    Morbid (severe) obesity due to excess calories ([address] 2/3/2022    Morbid obesity with BMI of 45.0-49.9, adult ([address] 2/13/2020    Morbid obesity with BMI of 50.0-59.9, adult ([address]     S/P gastric bypass 09/14/2020    Uncontrolled type 2 diabetes mellitus with hyperglycemia ([address] 6/8/2018    Visual impairment     GLASSES      Past Surgical History:   Procedure Laterality Date    LAP GASTRIC BYPASS/OPAL-EN-Y  09/14/2020    Dr Stacie Montero, [facility]      Family History   Problem Relation Age of Onset    Hypertension Father     Diabetes Father     Diabetes Mother     Other (EToHism. ) Maternal Grandmother     Diabetes Paternal Grandmother     Hypertension Paternal Grandmother     Lipids Paternal Grandmother     Renal Disease Paternal Grandmother     Diabetes Paternal Grandfather         Type 1, IDDM with retinoapthy. Stroke Paternal Grandfather     Other (PVD) Paternal Grandfather     Other (retinopathy) Paternal Grandfather       Social History:   Social History     Socioeconomic History    Marital status: Single   Occupational History    Occupation: HR. Tobacco Use    Smoking status: Never    Smokeless tobacco: Never   Vaping Use    Vaping Use: Never used   Substance and Sexual Activity    Alcohol use: Yes     Comment: socially     Drug use: Never        Objective:   Physical Exam  Vitals and nursing note reviewed. Constitutional:       Appearance: Normal appearance. HENT:      Head: Normocephalic and atraumatic. Cardiovascular:      Rate and Rhythm: Normal rate and regular rhythm. Pulses: Normal pulses. Heart sounds: Normal heart sounds. Pulmonary:      Effort: Pulmonary effort is normal.      Breath sounds: Normal breath sounds. Musculoskeletal:      Right shoulder: Tenderness present. No swelling, deformity, effusion, laceration or bony tenderness. Decreased range of motion. Cervical back: Normal range of motion and neck supple. Lumbar back: Tenderness present. Back:    Neurological:      Mental Status: She is alert. Assessment & Plan:   No diagnosis found. Right shoulder pain, exercise instruction , medrol dose pack   Sacral pain- chronic - referral for physiatry   Referral for ob  No orders of the defined types were placed in this encounter.       Meds This Visit:  Requested Prescriptions      No prescriptions requested or ordered in this encounter       Imaging & Referrals:  None

## 2023-11-29 NOTE — PATIENT INSTRUCTIONS
Right shoulder pain, exercise instruction , medrol dose pack   Sacral pain- chronic - referral for physiatry   Referral for ob

## 2023-11-30 RX ORDER — NORGESTIMATE AND ETHINYL ESTRADIOL 7DAYSX3 LO
1 KIT ORAL DAILY
Qty: 84 TABLET | Refills: 3 | Status: SHIPPED | OUTPATIENT
Start: 2023-11-30 | End: 2024-11-24

## 2023-11-30 NOTE — TELEPHONE ENCOUNTER
[facility] pharmacy is request 3 month supply. Please advise      Norgestim-Eth Estrad Triphasic (ORTHO TRI-CYCLEN LO) 0.18/0.215/0.25 MG-25 MCG Oral Tab 28 tablet 1 11/29/2023 11/23/2024    Sig - Route:  Take 1 tablet by mouth daily. - Oral    Sent to pharmacy as: Ellender Bakes Triphasic 0.18/0.215/0.25 MG-25 MCG Oral Tablet (Ortho Tri-Cyclen Lo)    E-Prescribing Status: Receipt confirmed by pharmacy (11/29/2023  3:29 PM CST)      Pharmacy    [address], [address] AT [facility], [phone], [phone]

## 2023-11-30 NOTE — TELEPHONE ENCOUNTER
Patient called to find out when the refill will be sent to the pharmacy.     [facility] [address] [place] [address], [address] [place] [address] [address], [phone], [phone]   [address] [employer] [address] [id number]   Phone: [phone] Fax: [phone]

## 2023-12-01 RX ORDER — NORGESTIMATE AND ETHINYL ESTRADIOL 7DAYSX3 LO
1 KIT ORAL DAILY
Qty: 84 TABLET | Refills: 0 | OUTPATIENT
Start: 2023-12-01

## 2023-12-01 NOTE — TELEPHONE ENCOUNTER
Norgestim-Eth Estrad Triphasic (ORTHO TRI-CYCLEN LO) 0.18/0.215/0.25 MG-25 MCG Oral Tab 84 tablet 3 11/30/2023 11/24/2024    Sig - Route:  Take 1 tablet by mouth daily. - Oral    Sent to pharmacy as: Ninetta Cart Triphasic 0.18/0.215/0.25 MG-25 MCG Oral Tablet (Ortho Tri-Cyclen Lo)    E-Prescribing Status: Receipt confirmed by pharmacy (11/30/2023 12:44 PM CST)

## 2023-12-11 ENCOUNTER — OFFICE VISIT (OUTPATIENT)
Dept: SURGERY | Facility: CLINIC | Age: 38
End: 2023-12-11
Payer: COMMERCIAL

## 2023-12-11 VITALS
OXYGEN SATURATION: 98 % | DIASTOLIC BLOOD PRESSURE: 70 MMHG | SYSTOLIC BLOOD PRESSURE: 112 MMHG | BODY MASS INDEX: 30.42 KG/M2 | HEIGHT: 57.2 IN | WEIGHT: 141 LBS | HEART RATE: 92 BPM

## 2023-12-11 DIAGNOSIS — E55.9 VITAMIN D DEFICIENCY: ICD-10-CM

## 2023-12-11 DIAGNOSIS — E44.1 MILD PROTEIN-CALORIE MALNUTRITION (HCC): ICD-10-CM

## 2023-12-11 DIAGNOSIS — Z98.84 S/P GASTRIC BYPASS: ICD-10-CM

## 2023-12-11 DIAGNOSIS — E66.9 OBESITY (BMI 30-39.9): ICD-10-CM

## 2023-12-11 DIAGNOSIS — I10 ESSENTIAL HYPERTENSION: ICD-10-CM

## 2023-12-11 DIAGNOSIS — E11.649 TYPE 2 DIABETES MELLITUS WITH HYPOGLYCEMIA WITHOUT COMA, WITHOUT LONG-TERM CURRENT USE OF INSULIN (HCC): Primary | ICD-10-CM

## 2023-12-11 PROCEDURE — 3078F DIAST BP <80 MM HG: CPT | Performed by: INTERNAL MEDICINE

## 2023-12-11 PROCEDURE — 99214 OFFICE O/P EST MOD 30 MIN: CPT | Performed by: INTERNAL MEDICINE

## 2023-12-11 PROCEDURE — 3074F SYST BP LT 130 MM HG: CPT | Performed by: INTERNAL MEDICINE

## 2023-12-11 PROCEDURE — 3008F BODY MASS INDEX DOCD: CPT | Performed by: INTERNAL MEDICINE

## 2023-12-11 RX ORDER — SEMAGLUTIDE 0.68 MG/ML
0.25 INJECTION, SOLUTION SUBCUTANEOUS WEEKLY
Qty: 3 ML | Refills: 3 | Status: SHIPPED | OUTPATIENT
Start: 2023-12-11 | End: 2024-01-10

## 2023-12-11 RX ORDER — PHENTERMINE HYDROCHLORIDE 15 MG/1
15 CAPSULE ORAL EVERY MORNING
Qty: 30 CAPSULE | Refills: 3 | Status: SHIPPED | OUTPATIENT
Start: 2023-12-11

## 2024-02-06 ENCOUNTER — TELEPHONE (OUTPATIENT)
Dept: OBGYN CLINIC | Facility: CLINIC | Age: 39
End: 2024-02-06

## 2024-02-06 NOTE — TELEPHONE ENCOUNTER
Called patient and left a voicemail to see if she can come at 3:00 pm instead of 4:00 pm please update the note to confirm. Do not move appointment time.

## 2024-02-26 ENCOUNTER — TELEPHONE (OUTPATIENT)
Dept: INTERNAL MEDICINE CLINIC | Facility: CLINIC | Age: 39
End: 2024-02-26

## 2024-02-26 NOTE — TELEPHONE ENCOUNTER
Brayan from [employer] called and said they fax pre authorization on 2/22 for DME supplies, wants to confirm if fax was received.         [id number]

## 2024-03-12 ENCOUNTER — OFFICE VISIT (OUTPATIENT)
Dept: INTERNAL MEDICINE CLINIC | Facility: CLINIC | Age: 39
End: 2024-03-12

## 2024-03-12 VITALS
DIASTOLIC BLOOD PRESSURE: 83 MMHG | TEMPERATURE: 99 F | BODY MASS INDEX: 29.26 KG/M2 | SYSTOLIC BLOOD PRESSURE: 135 MMHG | WEIGHT: 135.63 LBS | HEIGHT: 57 IN | HEART RATE: 89 BPM | OXYGEN SATURATION: 99 %

## 2024-03-12 DIAGNOSIS — Z98.84 S/P GASTRIC BYPASS: ICD-10-CM

## 2024-03-12 DIAGNOSIS — E66.01 CLASS 3 SEVERE OBESITY DUE TO EXCESS CALORIES WITH SERIOUS COMORBIDITY AND BODY MASS INDEX (BMI) OF 40.0 TO 44.9 IN ADULT (HCC): ICD-10-CM

## 2024-03-12 DIAGNOSIS — E11.3293 MILD NONPROLIFERATIVE DIABETIC RETINOPATHY OF BOTH EYES WITHOUT MACULAR EDEMA ASSOCIATED WITH TYPE 2 DIABETES MELLITUS (HCC): ICD-10-CM

## 2024-03-12 DIAGNOSIS — E55.9 VITAMIN D DEFICIENCY: ICD-10-CM

## 2024-03-12 DIAGNOSIS — L29.9 ITCHY SKIN: ICD-10-CM

## 2024-03-12 DIAGNOSIS — I10 ESSENTIAL HYPERTENSION: ICD-10-CM

## 2024-03-12 DIAGNOSIS — E11.649 TYPE 2 DIABETES MELLITUS WITH HYPOGLYCEMIA WITHOUT COMA, WITHOUT LONG-TERM CURRENT USE OF INSULIN (HCC): ICD-10-CM

## 2024-03-12 DIAGNOSIS — M54.50 CHRONIC MIDLINE LOW BACK PAIN WITHOUT SCIATICA: ICD-10-CM

## 2024-03-12 DIAGNOSIS — L30.4 INTERTRIGO: ICD-10-CM

## 2024-03-12 DIAGNOSIS — E78.00 HIGH CHOLESTEROL: ICD-10-CM

## 2024-03-12 DIAGNOSIS — Z98.890 S/P PANNICULECTOMY: ICD-10-CM

## 2024-03-12 DIAGNOSIS — L81.1 MELASMA: ICD-10-CM

## 2024-03-12 DIAGNOSIS — Z71.85 VACCINE COUNSELING: ICD-10-CM

## 2024-03-12 DIAGNOSIS — G47.09 OTHER INSOMNIA: ICD-10-CM

## 2024-03-12 DIAGNOSIS — K59.09 OTHER CONSTIPATION: ICD-10-CM

## 2024-03-12 DIAGNOSIS — Z12.4 PAP SMEAR FOR CERVICAL CANCER SCREENING: ICD-10-CM

## 2024-03-12 DIAGNOSIS — E78.2 HYPERCHOLESTEROLEMIA WITH HYPERTRIGLYCERIDEMIA: ICD-10-CM

## 2024-03-12 DIAGNOSIS — G89.29 CHRONIC MIDLINE LOW BACK PAIN WITHOUT SCIATICA: ICD-10-CM

## 2024-03-12 DIAGNOSIS — Z00.00 ADULT GENERAL MEDICAL EXAM: Primary | ICD-10-CM

## 2024-03-12 DIAGNOSIS — E66.01 MORBID OBESITY WITH BMI OF 45.0-49.9, ADULT (HCC): ICD-10-CM

## 2024-03-12 PROBLEM — E44.1 MILD PROTEIN-CALORIE MALNUTRITION (HCC): Status: RESOLVED | Noted: 2022-02-03 | Resolved: 2024-03-12

## 2024-03-12 LAB
APPEARANCE: CLEAR
BILIRUBIN: NEGATIVE
CREAT UR-SCNC: 94.6 MG/DL
GLUCOSE (URINE DIPSTICK): NEGATIVE MG/DL
KETONES (URINE DIPSTICK): NEGATIVE MG/DL
LEUKOCYTES: NEGATIVE
MICROALBUMIN UR-MCNC: 0.4 MG/DL
MICROALBUMIN/CREAT 24H UR-RTO: 4.2 UG/MG (ref ?–30)
MULTISTIX LOT#: NORMAL NUMERIC
NITRITE, URINE: NEGATIVE
OCCULT BLOOD: NEGATIVE
PH, URINE: 6.5 (ref 4.5–8)
PROTEIN (URINE DIPSTICK): NEGATIVE MG/DL
SPECIFIC GRAVITY: 1.02 (ref 1–1.03)
URINE-COLOR: YELLOW
UROBILINOGEN,SEMI-QN: 1 MG/DL (ref 0–1.9)

## 2024-03-12 PROCEDURE — 3008F BODY MASS INDEX DOCD: CPT | Performed by: INTERNAL MEDICINE

## 2024-03-12 PROCEDURE — 3075F SYST BP GE 130 - 139MM HG: CPT | Performed by: INTERNAL MEDICINE

## 2024-03-12 PROCEDURE — 99215 OFFICE O/P EST HI 40 MIN: CPT | Performed by: INTERNAL MEDICINE

## 2024-03-12 PROCEDURE — 3079F DIAST BP 80-89 MM HG: CPT | Performed by: INTERNAL MEDICINE

## 2024-03-12 PROCEDURE — 81003 URINALYSIS AUTO W/O SCOPE: CPT | Performed by: INTERNAL MEDICINE

## 2024-03-12 PROCEDURE — 99395 PREV VISIT EST AGE 18-39: CPT | Performed by: INTERNAL MEDICINE

## 2024-03-12 PROCEDURE — 3061F NEG MICROALBUMINURIA REV: CPT | Performed by: INTERNAL MEDICINE

## 2024-03-12 RX ORDER — FLUCONAZOLE 150 MG/1
150 TABLET ORAL ONCE
Qty: 1 TABLET | Refills: 0 | Status: SHIPPED | OUTPATIENT
Start: 2024-03-12 | End: 2024-03-12

## 2024-03-12 NOTE — PROGRESS NOTES
HPI:    Patient ID: Gisell Zepeda is a 38 year old female.    Gisell Zepeda is a 38 year old female who presents for a complete physical exam.   HPI:     Chief Complaint   Patient presents with    Physical     Patient states she is here for Annual Physical Examination.         Patient's last menstrual period was 10/31/2023 (approximate). On BCP X 4 months. Menses have been spotting X 3 days. Sees gyne. 3-15-24. Occ. misssanchez doses. Prior was heavy menses and irregaular ie q1-3 months X 1 week.     /83 (BP Location: Right arm, Patient Position: Sitting, Cuff Size: adult)   Pulse 89   Temp 98.5 °F (36.9 °C) (Oral)   Ht 4' 9\" (1.448 m)   Wt 135 lb 9.6 oz (61.5 kg)   LMP 10/31/2023 (Approximate)   SpO2 99%   BMI 29.34 kg/m²    Wt Readings from Last 4 Encounters:   03/12/24 135 lb 9.6 oz (61.5 kg)   12/11/23 141 lb (64 kg)   11/29/23 139 lb (63 kg)   06/27/23 145 lb (65.8 kg)     Body mass index is 29.34 kg/m².     Labs:   Lab Results   Component Value Date/Time    WBC 6.2 09/16/2022 10:55 AM    HGB 12.2 09/16/2022 10:55 AM    .0 09/16/2022 10:55 AM      Lab Results   Component Value Date/Time     (H) 07/01/2023 07:32 AM     07/01/2023 07:32 AM    K 4.2 07/01/2023 07:32 AM     07/01/2023 07:32 AM    CO2 29.0 07/01/2023 07:32 AM    CREATSERUM 0.46 (L) 07/01/2023 07:32 AM    CA 8.7 07/01/2023 07:32 AM    ALB 3.6 07/01/2023 07:32 AM    TP 7.2 07/01/2023 07:32 AM    ALKPHO 48 07/01/2023 07:32 AM    AST 13 (L) 07/01/2023 07:32 AM    ALT 19 07/01/2023 07:32 AM    BILT 0.3 07/01/2023 07:32 AM    TSH 0.914 09/16/2022 10:55 AM        Lab Results   Component Value Date/Time    CHOLEST 186 07/01/2023 07:32 AM    HDL 68 (H) 07/01/2023 07:32 AM    TRIG 71 07/01/2023 07:32 AM     (H) 07/01/2023 07:32 AM    NONHDLC 118 07/01/2023 07:32 AM       Lab Results   Component Value Date/Time    A1C 6.5 (H) 07/01/2023 07:32 AM      Lab Results   Component Value Date    VITD 34.1 09/16/2022         No  recommendations at this time    Current Outpatient Medications   Medication Sig Dispense Refill    Phentermine HCl 15 MG Oral Cap Take 1 capsule (15 mg total) by mouth every morning. 30 capsule 3    metFORMIN 500 MG Oral Tab Take 1 tablet (500 mg total) by mouth Q12H. 180 tablet 1    Norgestim-Eth Estrad Triphasic (ORTHO TRI-CYCLEN LO) 0.18/0.215/0.25 MG-25 MCG Oral Tab Take 1 tablet by mouth daily. 84 tablet 3    methylPREDNISolone (MEDROL) 4 MG Oral Tablet Therapy Pack As directed. 1 each 0    ondansetron (ZOFRAN) 4 mg tablet Take 1 tablet (4 mg total) by mouth every 8 (eight) hours as needed for Nausea. 10 tablet 3    Glucose Blood (ONETOUCH ULTRA) In Vitro Strip CHECK BLOOD SUGAR TWICE DAILY 200 strip 3    fluconazole 200 MG Oral Tab Take 4 tablets (800 mg total) by mouth daily. 28 tablet 0    Lancets Does not apply Misc Check blood sugar twice daily. 50 each 2    Blood Glucose Monitoring Suppl (ONETOUCH ULTRA 2) w/Device Does not apply Kit Check blood sugar 2 times daily 1 kit 0    Continuous Blood Gluc  (DEXCOM G6 ) Does not apply Device 1 each by Does not apply route See Admin Instructions. 1 Device 0    Continuous Blood Gluc Sensor (DEXCOM G6 SENSOR) Does not apply Misc 1 each by Does not apply route See Admin Instructions. 9 each 3    Continuous Blood Gluc Transmit (DEXCOM G6 TRANSMITTER) Does not apply Misc 1 each by Does not apply route See Admin Instructions. 3 each 3    Multiple Vitamin (MULTIVITAMINS OR) Take by mouth. Bariatric vitamins       semaglutide (OZEMPIC, 0.25 OR 0.5 MG/DOSE,) 2 MG/3ML Subcutaneous Solution Pen-injector Inject 0.25 mg into the skin once a week. 3 mL 3    pantoprazole 40 MG Oral Tab EC Take 1 tablet (40 mg total) by mouth As Directed. (Patient not taking: Reported on 11/29/2023)      mupirocin 2 % External Ointment Apply 1 Application. topically 2 (two) times daily. (Patient not taking: Reported on 11/29/2023) 1 g 0    Nystatin 897009 UNIT/GM External Powder  Apply 1 Application topically 4 (four) times daily. Apply to affected areas (Patient not taking: Reported on 11/29/2023) 30 g 1      Past Medical History:   Diagnosis Date    Abrasion of toe of left foot 10/30/2020    Class 3 severe obesity due to excess calories with serious comorbidity and body mass index (BMI) of 40.0 to 44.9 in adult ([facility]) 09/14/2020    Diabetes mellitus, type 2 ([facility]) 09/14/2020    Essential hypertension     High blood pressure     High cholesterol     High cholesterol 01/14/2019    Hypercholesterolemia with hypertriglyceridemia     Intertrigo 02/03/2022    Morbid (severe) obesity due to excess calories ([facility]) 02/03/2022    Morbid obesity with BMI of 45.0-49.9, adult ([facility]) 02/13/2020    Morbid obesity with BMI of 50.0-59.9, adult ([facility])     S/P gastric bypass 09/14/2020    Uncontrolled type 2 diabetes mellitus with hyperglycemia ([facility]) 06/08/2018    Visual impairment     GLASSES      Past Surgical History:   Procedure Laterality Date    LAP GASTRIC BYPASS/OPAL-EN-Y  09/14/2020    Dr Black, [facility]      Family History   Problem Relation Age of Onset    Diabetes Mother     Breast Cancer Mother     Hypertension Father     Diabetes Father     Other (EToHism. ) Maternal Grandmother     Diabetes Paternal Grandmother     Hypertension Paternal Grandmother     Lipids Paternal Grandmother     Renal Disease Paternal Grandmother     Diabetes Paternal Grandfather         Type 1, IDDM with retinoapthy.     Stroke Paternal Grandfather     Other (PVD) Paternal Grandfather     Other (retinopathy) Paternal Grandfather     Breast Cancer Paternal Aunt       Social History:  Social History     Socioeconomic History    Marital status: Single   Occupational History    Occupation: HR.    Tobacco Use    Smoking status: Never    Smokeless tobacco: Never   Vaping Use    Vaping Use: Never used   Substance and Sexual Activity    Alcohol use: Yes     Comment: socially     Drug use: Never         OB History   No  obstetric history on file.        Hx of Pap: all Paps normal        HPV + but recheck in 1 yr. affer,  negative.     Patient here for follow up of Diabetes.  Has been taking medications regularly.    Checks sugars 1 times daily.  Fasting sugars average . Work is hectic. Lows in Am or during night. Not eat dinner when gets home from work late. Now, eats spoonful peanut butter. And does not notice early AM lows then. When, busy at work, dont;'t eat and then stuffs face. Thus, higher. Month Feb., hectic more. Starts at 4 AM nd home at  6 to 7 PM.  Now working only 3 days a week and now, meal preps. When, cooks, keto friendly.   Watches diabetic diet, low salt.  Diabetic Flow sheet      Latest Ref Rng & Units 3/12/2024     6:33 PM 3/12/2024     6:28 PM 3/12/2024     5:31 PM 3/12/2024     5:25 PM   DIABETIC FLOWSHEET ([facility])   BMI    29.34 kg/m2    Microalbumin Urine mg/dL 0.40       Microalb/Creatinine Calc <=30.0 ug/mg 4.2       Weight (enc vitals)    135 lb 9.6 oz    BP (enc vitals)    135/83    Last Foot Exam   3/12/2024     PHQ2 Score     0     Hypertension  Patient is here for follow up of hypertension. BP at home: not check.   Has been compliant with medications.  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  Weight has been stable. Eatting out more. But now, meal prepps.No added salt.    Wt Readings from Last 3 Encounters:   03/12/24 135 lb 9.6 oz (61.5 kg)   12/11/23 141 lb (64 kg)   11/29/23 139 lb (63 kg)     BP Readings from Last 3 Encounters:   03/12/24 135/83   12/11/23 112/70   11/29/23 124/75       Labs:   Lab Results   Component Value Date/Time     (H) 07/01/2023 07:32 AM     07/01/2023 07:32 AM    K 4.2 07/01/2023 07:32 AM     07/01/2023 07:32 AM    CO2 29.0 07/01/2023 07:32 AM    CREATSERUM 0.46 (L) 07/01/2023 07:32 AM     CA 8.7 07/01/2023 07:32 AM    AST 13 (L) 07/01/2023 07:32 AM    ALT 19 07/01/2023 07:32 AM    TSH 0.914 09/16/2022 10:55 AM        Lab Results   Component Value Date/Time    CHOLEST 186 07/01/2023 07:32 AM    HDL 68 (H) 07/01/2023 07:32 AM    TRIG 71 07/01/2023 07:32 AM     (H) 07/01/2023 07:32 AM    NONHDLC 118 07/01/2023 07:32 AM          Labs:   Lab Results   Component Value Date/Time     (H) 07/01/2023 07:32 AM     07/01/2023 07:32 AM    K 4.2 07/01/2023 07:32 AM     07/01/2023 07:32 AM    CO2 29.0 07/01/2023 07:32 AM    CREATSERUM 0.46 (L) 07/01/2023 07:32 AM    CA 8.7 07/01/2023 07:32 AM    AST 13 (L) 07/01/2023 07:32 AM    ALT 19 07/01/2023 07:32 AM    TSH 0.914 09/16/2022 10:55 AM        Lab Results   Component Value Date/Time    CHOLEST 186 07/01/2023 07:32 AM    HDL 68 (H) 07/01/2023 07:32 AM    TRIG 71 07/01/2023 07:32 AM     (H) 07/01/2023 07:32 AM    NONHDLC 118 07/01/2023 07:32 AM          Itchy ears especially when the weather changes.  No new construction in the home.  Feels itchy in the back of the throat also.  No difficulty breathing or throat closing.  Has tried Claritin in the past and has helped but too groggy with Claritin.    Constipation  This is a chronic problem. The current episode started more than 1 year ago. The problem is unchanged. Her stool frequency is 1 time per week or less. The stool is described as formed and pellet like. The patient is on a high fiber diet. She Exercises regularly. There has Been adequate water intake. Pertinent negatives include no abdominal pain, diarrhea, difficulty urinating, fever, hematochezia, hemorrhoids, melena, nausea, rectal pain or vomiting. Risk factors include dietary change. She has tried fiber and manual disimpaction for the symptoms.   Vaginal Discharge  The patient's primary symptoms include genital itching. The patient's pertinent negatives include no genital lesions, genital odor, genital rash, missed  menses, pelvic pain, vaginal bleeding or vaginal discharge. Chronicity: Monogamous with partner.  Occasionally uses condoms.  No history of STDs. The current episode started in the past 7 days. The problem occurs constantly. The problem has been unchanged. The patient is experiencing no pain. She is not pregnant. Associated symptoms include constipation. Pertinent negatives include no abdominal pain, chills, diarrhea, dysuria, fever, flank pain, frequency, headaches, hematuria, nausea, painful intercourse, rash, sore throat, urgency or vomiting. The vaginal discharge was normal. There has been no bleeding. She has not been passing clots. She has not been passing tissue. Nothing aggravates the symptoms. She has tried nothing for the symptoms. She uses oral contraceptives for contraception.         Review of Systems   Constitutional:  Negative for activity change, appetite change, chills, diaphoresis, fatigue, fever and unexpected weight change.   HENT:  Negative for congestion, dental problem, drooling, ear discharge, ear pain, facial swelling, hearing loss, mouth sores, nosebleeds, postnasal drip, rhinorrhea, sinus pressure, sinus pain, sneezing, sore throat, tinnitus, trouble swallowing and voice change.    Eyes:  Negative for photophobia, pain, discharge, redness, itching and visual disturbance.   Respiratory:  Negative for apnea, cough, choking, chest tightness, shortness of breath, wheezing and stridor.    Cardiovascular:  Negative for chest pain, palpitations and leg swelling.   Gastrointestinal:  Positive for constipation. Negative for abdominal distention, abdominal pain, anal bleeding, blood in stool, diarrhea, hematochezia, hemorrhoids, melena, nausea, rectal pain and vomiting.   Endocrine: Negative for cold intolerance, heat intolerance, polydipsia, polyphagia and polyuria.   Genitourinary:  Negative for decreased urine volume, difficulty urinating, dysuria, flank pain, frequency, genital sores, hematuria,  menstrual problem, missed menses, pelvic pain, urgency, vaginal bleeding, vaginal discharge and vaginal pain.   Skin:  Negative for rash.   Neurological:  Negative for dizziness, tremors, seizures, syncope, facial asymmetry, speech difficulty, weakness, light-headedness, numbness and headaches.   Psychiatric/Behavioral:  Negative for agitation, behavioral problems, confusion, decreased concentration, dysphoric mood, hallucinations, self-injury, sleep disturbance and suicidal ideas. The patient is not nervous/anxious and is not hyperactive.    All other systems reviewed and are negative.        Current Outpatient Medications   Medication Sig Dispense Refill    Phentermine HCl 15 MG Oral Cap Take 1 capsule (15 mg total) by mouth every morning. 30 capsule 3    metFORMIN 500 MG Oral Tab Take 1 tablet (500 mg total) by mouth Q12H. 180 tablet 1    Norgestim-Eth Estrad Triphasic (ORTHO TRI-CYCLEN LO) 0.18/0.215/0.25 MG-25 MCG Oral Tab Take 1 tablet by mouth daily. 84 tablet 3    methylPREDNISolone (MEDROL) 4 MG Oral Tablet Therapy Pack As directed. 1 each 0    ondansetron (ZOFRAN) 4 mg tablet Take 1 tablet (4 mg total) by mouth every 8 (eight) hours as needed for Nausea. 10 tablet 3    Glucose Blood (ONETOUCH ULTRA) In Vitro Strip CHECK BLOOD SUGAR TWICE DAILY 200 strip 3    fluconazole 200 MG Oral Tab Take 4 tablets (800 mg total) by mouth daily. 28 tablet 0    Lancets Does not apply Misc Check blood sugar twice daily. 50 each 2    Blood Glucose Monitoring Suppl (ONETOUCH ULTRA 2) w/Device Does not apply Kit Check blood sugar 2 times daily 1 kit 0    Continuous Blood Gluc  (DEXCOM G6 ) Does not apply Device 1 each by Does not apply route See Admin Instructions. 1 Device 0    Continuous Blood Gluc Sensor (DEXCOM G6 SENSOR) Does not apply Misc 1 each by Does not apply route See Admin Instructions. 9 each 3    Continuous Blood Gluc Transmit (DEXCOM G6 TRANSMITTER) Does not apply Misc 1 each by Does not apply  route See Admin Instructions. 3 each 3    Multiple Vitamin (MULTIVITAMINS OR) Take by mouth. Bariatric vitamins       semaglutide (OZEMPIC, 0.25 OR 0.5 MG/DOSE,) 2 MG/3ML Subcutaneous Solution Pen-injector Inject 0.25 mg into the skin once a week. 3 mL 3    pantoprazole 40 MG Oral Tab EC Take 1 tablet (40 mg total) by mouth As Directed. (Patient not taking: Reported on 11/29/2023)      mupirocin 2 % External Ointment Apply 1 Application. topically 2 (two) times daily. (Patient not taking: Reported on 11/29/2023) 1 g 0    Nystatin 393586 UNIT/GM External Powder Apply 1 Application topically 4 (four) times daily. Apply to affected areas (Patient not taking: Reported on 11/29/2023) 30 g 1     Allergies:No Known Allergies    HISTORY:  Past Medical History:   Diagnosis Date    Abrasion of toe of left foot 10/30/2020    Class 3 severe obesity due to excess calories with serious comorbidity and body mass index (BMI) of 40.0 to 44.9 in adult ([facility]) 09/14/2020    Diabetes mellitus, type 2 ([facility]) 09/14/2020    Essential hypertension     High blood pressure     High cholesterol     High cholesterol 01/14/2019    Hypercholesterolemia with hypertriglyceridemia     Intertrigo 02/03/2022    Morbid (severe) obesity due to excess calories ([facility]) 02/03/2022    Morbid obesity with BMI of 45.0-49.9, adult ([facility]) 02/13/2020    Morbid obesity with BMI of 50.0-59.9, adult ([facility])     S/P gastric bypass 09/14/2020    Uncontrolled type 2 diabetes mellitus with hyperglycemia ([facility]) 06/08/2018    Visual impairment     GLASSES      Past Surgical History:   Procedure Laterality Date    LAP GASTRIC BYPASS/OPAL-EN-Y  09/14/2020    Dr Black, [facility]      Family History   Problem Relation Age of Onset    Diabetes Mother     Breast Cancer Mother     Hypertension Father     Diabetes Father     Other (EToHism. ) Maternal Grandmother     Diabetes Paternal Grandmother     Hypertension Paternal Grandmother     Lipids Paternal Grandmother     Renal  Disease Paternal Grandmother     Diabetes Paternal Grandfather         Type 1, IDDM with retinoapthy.     Stroke Paternal Grandfather     Other (PVD) Paternal Grandfather     Other (retinopathy) Paternal Grandfather     Breast Cancer Paternal Aunt       Social History:   Social History     Socioeconomic History    Marital status: Single   Occupational History    Occupation: HR.    Tobacco Use    Smoking status: Never    Smokeless tobacco: Never   Vaping Use    Vaping Use: Never used   Substance and Sexual Activity    Alcohol use: Yes     Comment: socially     Drug use: Never        PHYSICAL EXAM:   /83 (BP Location: Right arm, Patient Position: Sitting, Cuff Size: adult)   Pulse 89   Temp 98.5 °F (36.9 °C) (Oral)   Ht 4' 9\" (1.448 m)   Wt 135 lb 9.6 oz (61.5 kg)   LMP 10/31/2023 (Approximate)   SpO2 99%   BMI 29.34 kg/m²   BP Readings from Last 3 Encounters:   03/12/24 135/83   12/11/23 112/70   11/29/23 124/75     Wt Readings from Last 3 Encounters:   03/12/24 135 lb 9.6 oz (61.5 kg)   12/11/23 141 lb (64 kg)   11/29/23 139 lb (63 kg)       Physical Exam  Vitals and nursing note reviewed.   Constitutional:       General: She is not in acute distress.     Appearance: Normal appearance. She is well-developed and well-groomed. She is not ill-appearing, toxic-appearing or diaphoretic.      Interventions: She is not intubated.  HENT:      Head: Normocephalic and atraumatic.      Right Ear: Tympanic membrane, ear canal and external ear normal. No decreased hearing noted. No laceration, drainage, swelling or tenderness. No middle ear effusion. There is no impacted cerumen. No foreign body. No mastoid tenderness. No PE tube. No hemotympanum. Tympanic membrane is not injected, scarred, perforated, erythematous, retracted or bulging. Tympanic membrane has normal mobility.      Left Ear: Tympanic membrane, ear canal and external ear normal. No decreased hearing noted. No laceration, drainage, swelling or  tenderness.  No middle ear effusion. There is no impacted cerumen. No foreign body. No mastoid tenderness. No PE tube. No hemotympanum. Tympanic membrane is not injected, scarred, perforated, erythematous, retracted or bulging. Tympanic membrane has normal mobility.      Nose:      Right Sinus: No maxillary sinus tenderness or frontal sinus tenderness.      Left Sinus: No maxillary sinus tenderness or frontal sinus tenderness.      Mouth/Throat:      Comments: Patient wearing mask.   Did not have patient remove mask due to current Covid virus situation.  Eyes:      General: Lids are normal. No scleral icterus.        Right eye: No foreign body, discharge or hordeolum.         Left eye: No foreign body, discharge or hordeolum.      Extraocular Movements: Extraocular movements intact.      Right eye: Normal extraocular motion and no nystagmus.      Left eye: Normal extraocular motion and no nystagmus.      Conjunctiva/sclera: Conjunctivae normal.      Right eye: Right conjunctiva is not injected. No chemosis, exudate or hemorrhage.     Left eye: Left conjunctiva is not injected. No chemosis, exudate or hemorrhage.     Pupils: Pupils are equal, round, and reactive to light.   Neck:      Thyroid: No thyroid mass, thyromegaly or thyroid tenderness.      Vascular: Normal carotid pulses. No carotid bruit, hepatojugular reflux or JVD.      Trachea: Trachea and phonation normal. No tracheal tenderness, tracheostomy, abnormal tracheal secretions or tracheal deviation.   Cardiovascular:      Rate and Rhythm: Normal rate and regular rhythm.      Pulses: Normal pulses.           Carotid pulses are 2+ on the right side and 2+ on the left side.       Radial pulses are 2+ on the right side and 2+ on the left side.        Dorsalis pedis pulses are 2+ on the right side and 2+ on the left side.        Posterior tibial pulses are 2+ on the right side and 2+ on the left side.      Heart sounds: Normal heart sounds, S1 normal and S2  normal.   Pulmonary:      Effort: Pulmonary effort is normal. No tachypnea, bradypnea, accessory muscle usage, prolonged expiration, respiratory distress or retractions. She is not intubated.      Breath sounds: Normal breath sounds and air entry. No stridor, decreased air movement or transmitted upper airway sounds. No decreased breath sounds, wheezing, rhonchi or rales.       Chest:      Chest wall: No tenderness.   Breasts:     Breasts are symmetrical.      Right: No swelling, bleeding, inverted nipple, mass, nipple discharge, skin change or tenderness.      Left: No swelling, bleeding, inverted nipple, mass, nipple discharge, skin change or tenderness.      Comments: Bilateral breast implants.  Abdominal:      General: Bowel sounds are normal. There is no distension.      Palpations: Abdomen is soft. Abdomen is not rigid. There is no fluid wave, hepatomegaly, splenomegaly or mass.      Tenderness: There is no abdominal tenderness. There is no right CVA tenderness, left CVA tenderness, guarding or rebound.   Genitourinary:     General: Normal vulva.      Exam position: Lithotomy position.      Pubic Area: No rash or pubic lice.       Labia:         Right: No rash, tenderness, lesion or injury.         Left: No rash, tenderness, lesion or injury.       Urethra: No prolapse, urethral pain, urethral swelling or urethral lesion.      Vagina: No signs of injury and foreign body. Vaginal discharge present. No erythema, tenderness (Thick whitish DC without odor.), bleeding, lesions or prolapsed vaginal walls.      Cervix: No cervical motion tenderness, discharge, friability, lesion, erythema, cervical bleeding or eversion.   Musculoskeletal:      Cervical back: Neck supple. No tenderness.      Right lower leg: No edema.      Left lower leg: No edema.   Lymphadenopathy:      Head:      Right side of head: No submental, submandibular, preauricular, posterior auricular or occipital adenopathy.      Left side of head: No  submental, submandibular, preauricular, posterior auricular or occipital adenopathy.      Cervical: No cervical adenopathy.      Right cervical: No superficial, deep or posterior cervical adenopathy.     Left cervical: No superficial, deep or posterior cervical adenopathy.      Upper Body:      Right upper body: No supraclavicular, axillary or pectoral adenopathy.      Left upper body: No supraclavicular, axillary or pectoral adenopathy.      Lower Body: No right inguinal adenopathy. No left inguinal adenopathy.   Skin:     General: Skin is warm and dry.      Coloration: Skin is not pale.      Findings: No erythema or rash.   Neurological:      Mental Status: She is alert and oriented to person, place, and time.   Psychiatric:         Mood and Affect: Mood normal.         Speech: Speech normal.         Behavior: Behavior normal. Behavior is cooperative.       Bilateral barefoot skin diabetic exam is normal, visualized feet and the appearance is normal.  Bilateral monofilament/sensation of both feet is normal.  Pulsation pedal pulse exam of both lower legs/feet is normal as well.          ASSESSMENT/PLAN:     Encounter Diagnoses   Name Primary?    Adult general medical exam Check urine.    Yes    Chronic midline low back pain without sciatica Stable.        Class 3 severe obesity due to excess calories with serious comorbidity and body mass index (BMI) of 40.0 to 44.9 in adult ([facility]) Lost weight.  Follow-up with bariatrics.  Sees GYN as an outpatient.       Mild nonproliferative diabetic retinopathy of both eyes without macular edema associated with type 2 diabetes mellitus ([facility]) No new vision changes. FU optho.       Melasma     Intertrigo       Hypercholesterolemia with hypertriglyceridemia Stable.        Essential hypertension Stable. Careful with diet and excercise at least 30 minutes 3-4 times a week. Check blood pressures at different times on different days. Can purchase own blood pressure monitor. If not,  check at local pharmacy. Bake foods more and grill occasionally. Avoid fried foods. No salt. Use other seasonings.         High cholesterol Stable.        Pap smear for cervical cancer screening Up to date.        Morbid obesity with BMI of 45.0-49.9, adult ([facility])     S/P gastric bypass Stable.        S/P panniculectomy       Type 2 diabetes mellitus with hypoglycemia without coma, without long-term current use of insulin ([facility]) Stable. Check blood. Careful with diet and excercise at least 30 minutes 3-4 times a week. Check sugars at different times on different dates. Careful with low sugars. Carry something with you and check sugar if can. Can carry dylan cracker, etc. Decrease carbohydrates. But also, careful with fruits and natural sugars.One serving a day and no more than 1 handful every day. Check feet  every AM and careful with sores and ulcers on feet bilaterally. Check eyes every year with dilated eye exam.  Check sugars.  2-hour postmeal should be less than 140s.  Pre-meal should be 's.  Both equally affected A1c.  Discussed importance of glycemic control to prevent complications of diabetes  -Discussed complications of diabetes include retinopathy, neuropathy, nephropathy and cardiovascular disease  -Discussed ABCs of DM  -Discussed importance of SBGM  -Discussed importance of low CHO diet, recommend 45gm per meal or 135gm per day  -Normal foot exam  -UTD with optho  -Normotensive        Vaccine counseling PCV 20 today.        Vitamin D deficiency Check blood.        Itchy skin warmer water showers and lotion afterwards.    Ithcy ears. Try allergra 60 mg 2 times a day. Discussed with patient of side effects and use of these medications.         Other insomnia Stick to a sleep schedule. Keep your bedtime and wake time consistent from day to day, including on weekends.   Stay active. Regular activity helps promote a good night's sleep. Schedule exercise at least a few hours before bedtime and avoid  stimulating activities before bedtime.   Check your medications. If you take medications regularly, check with your doctor to see if they may be contributing to your insomnia. Also check the labels of OTC products to see if they contain caffeine or other stimulants, such as pseudoephedrine.   Avoid or limit naps. Naps can make it harder to fall asleep at night. If you can't get by without one, try to limit a nap to no more than 30 minutes and don't nap after 3 p.m.   Avoid or limit caffeine and alcohol and don't use nicotine. All of these can make it harder to sleep, and effects can last for several hours.   Avoid large meals and beverages before bed. A light snack is fine and may help avoid heartburn. Drink less liquid before bedtime so that you won't have to urinate as often.  At bedtime:  Try melatonin 10 mg 30 minutes before bed.  Make your bedroom comfortable for sleep. Only use your bedroom for sex or sleep. Keep it dark and quiet, at a comfortable temperature. Hide all clocks in your bedroom, including your wristwatch and cellphone, so you don't worry about what time it is.   Find ways to relax. Try to put your worries and planning aside when you get into bed. A warm bath or a massage before bedtime can help prepare you for sleep. Create a relaxing bedtime ritual, such as taking a hot bath, reading, soft music, breathing exercises, yoga or prayer.   Avoid trying too hard to sleep. The harder you try, the more awake you'll become. Read in another room until you become very drowsy, then go to bed to sleep. Don't go to bed too early, before you're sleepy.   Get out of bed when you're not sleeping. Sleep as much as you need to feel rested, and then get out of bed. Don't stay in bed if you're not sleeping..        Other constipation Increase benefiber 1 tsp. a day. Increase activity.       Vaginal itching. Try diflucan 150 mg X 1 with cold carbonated beverage. Discussed with patient of side effects and use of  these medications.      Orders Placed This Encounter   Procedures    Microalb/Creat Ratio, Random Urine    Hemoglobin A1C    URINALYSIS, AUTO, W/O SCOPE    Prevnar 20 (PCV20) [29672]       Meds This Visit:  Requested Prescriptions     Signed Prescriptions Disp Refills    fluconazole (DIFLUCAN) 150 MG Oral Tab 1 tablet 0     Sig: Take 1 tablet (150 mg total) by mouth once for 1 dose.       Imaging & Referrals:  PCV20 VACCINE FOR INTRAMUSCULAR USE        RTC 5 months for FU.

## 2024-03-13 NOTE — PATIENT INSTRUCTIONS
ASSESSMENT/PLAN:     Encounter Diagnoses   Name Primary?    Adult general medical exam Check urine.    Yes    Chronic midline low back pain without sciatica Stable.        Class 3 severe obesity due to excess calories with serious comorbidity and body mass index (BMI) of 40.0 to 44.9 in adult ([facility]) Lost weight.        Mild nonproliferative diabetic retinopathy of both eyes without macular edema associated with type 2 diabetes mellitus ([facility]) No new vision changes. FU optho.       Melasma     Intertrigo       Hypercholesterolemia with hypertriglyceridemia Stable.        Essential hypertension Stable. Careful with diet and excercise at least 30 minutes 3-4 times a week. Check blood pressures at different times on different days. Can purchase own blood pressure monitor. If not, check at local pharmacy. Bake foods more and grill occasionally. Avoid fried foods. No salt. Use other seasonings.         High cholesterol Stable.        Pap smear for cervical cancer screening Up to date.        Morbid obesity with BMI of 45.0-49.9, adult ([facility])     S/P gastric bypass Stable.        S/P panniculectomy       Type 2 diabetes mellitus with hypoglycemia without coma, without long-term current use of insulin ([facility]) Stable. Check blood. Careful with diet and excercise at least 30 minutes 3-4 times a week. Check sugars at different times on different dates. Careful with low sugars. Carry something with you and check sugar if can. Can carry dylan cracker, etc. Decrease carbohydrates. But also, careful with fruits and natural sugars.One serving a day and no more than 1 handful every day. Check feet  every AM and careful with sores and ulcers on feet bilaterally. Check eyes every year with dilated eye exam.  Check sugars.  2-hour postmeal should be less than 140s.  Pre-meal should be 's.  Both equally affected A1c.  Discussed importance of glycemic control to prevent complications of diabetes  -Discussed complications of diabetes  include retinopathy, neuropathy, nephropathy and cardiovascular disease  -Discussed ABCs of DM  -Discussed importance of SBGM  -Discussed importance of low CHO diet, recommend 45gm per meal or 135gm per day  -Normal foot exam  -UTD with optho  -Normotensive        Vaccine counseling PCV 20 today.        Vitamin D deficiency Check blood.        Itchy skin warmer water showers and lotion afterwards.    Ithcy ears. Try allergra 60 mg 2 times a day. Discussed with patient of side effects and use of these medications.         Other insomnia Stick to a sleep schedule. Keep your bedtime and wake time consistent from day to day, including on weekends.   Stay active. Regular activity helps promote a good night's sleep. Schedule exercise at least a few hours before bedtime and avoid stimulating activities before bedtime.   Check your medications. If you take medications regularly, check with your doctor to see if they may be contributing to your insomnia. Also check the labels of OTC products to see if they contain caffeine or other stimulants, such as pseudoephedrine.   Avoid or limit naps. Naps can make it harder to fall asleep at night. If you can't get by without one, try to limit a nap to no more than 30 minutes and don't nap after 3 p.m.   Avoid or limit caffeine and alcohol and don't use nicotine. All of these can make it harder to sleep, and effects can last for several hours.   Avoid large meals and beverages before bed. A light snack is fine and may help avoid heartburn. Drink less liquid before bedtime so that you won't have to urinate as often.  At bedtime:  Try melatonin 10 mg 30 minutes before bed.  Make your bedroom comfortable for sleep. Only use your bedroom for sex or sleep. Keep it dark and quiet, at a comfortable temperature. Hide all clocks in your bedroom, including your wristwatch and cellphone, so you don't worry about what time it is.   Find ways to relax. Try to put your worries and planning aside  when you get into bed. A warm bath or a massage before bedtime can help prepare you for sleep. Create a relaxing bedtime ritual, such as taking a hot bath, reading, soft music, breathing exercises, yoga or prayer.   Avoid trying too hard to sleep. The harder you try, the more awake you'll become. Read in another room until you become very drowsy, then go to bed to sleep. Don't go to bed too early, before you're sleepy.   Get out of bed when you're not sleeping. Sleep as much as you need to feel rested, and then get out of bed. Don't stay in bed if you're not sleeping..        Other constipation Increase benefiber 1 tsp. a day. Increase activity.       Vaginal itching. Try diflucan 150 mg X 1 with cold carbonated beverage. Discussed with patient of side effects and use of these medications.      Orders Placed This Encounter   Procedures    Microalb/Creat Ratio, Random Urine    Hemoglobin A1C    URINALYSIS, AUTO, W/O SCOPE       Meds This Visit:  Requested Prescriptions     Signed Prescriptions Disp Refills    fluconazole (DIFLUCAN) 150 MG Oral Tab 1 tablet 0     Sig: Take 1 tablet (150 mg total) by mouth once for 1 dose.       Imaging & Referrals:  PCV20 VACCINE FOR INTRAMUSCULAR USE       RTC 5 months for FU.

## 2024-03-14 PROCEDURE — 3075F SYST BP GE 130 - 139MM HG: CPT | Performed by: INTERNAL MEDICINE

## 2024-03-14 PROCEDURE — 3079F DIAST BP 80-89 MM HG: CPT | Performed by: INTERNAL MEDICINE

## 2024-03-14 PROCEDURE — 3008F BODY MASS INDEX DOCD: CPT | Performed by: INTERNAL MEDICINE

## 2024-03-14 PROCEDURE — 90677 PCV20 VACCINE IM: CPT | Performed by: INTERNAL MEDICINE

## 2024-03-14 PROCEDURE — 90471 IMMUNIZATION ADMIN: CPT | Performed by: INTERNAL MEDICINE

## 2024-03-19 ENCOUNTER — OFFICE VISIT (OUTPATIENT)
Dept: OBGYN CLINIC | Facility: CLINIC | Age: 39
End: 2024-03-19
Payer: COMMERCIAL

## 2024-03-19 VITALS — HEIGHT: 57 IN | BODY MASS INDEX: 29 KG/M2

## 2024-03-19 DIAGNOSIS — Z30.41 SURVEILLANCE FOR BIRTH CONTROL, ORAL CONTRACEPTIVES: Primary | ICD-10-CM

## 2024-03-19 PROCEDURE — 99203 OFFICE O/P NEW LOW 30 MIN: CPT | Performed by: OBSTETRICS & GYNECOLOGY

## 2024-03-19 RX ORDER — NORGESTIMATE AND ETHINYL ESTRADIOL 7DAYSX3 LO
1 KIT ORAL DAILY
Qty: 84 TABLET | Refills: 3 | Status: SHIPPED | OUTPATIENT
Start: 2024-03-19 | End: 2025-03-14

## 2024-03-19 NOTE — PROGRESS NOTES
[facility]  Obstetrics and Gynecology  Gynecology Visit    Chief Complaint   Patient presents with    Consult           Gisell Zepeda is a 38 year old female who presents for consult.    LMP: n/a .    Menses regular: n/a.    Menstrual flow normal: n/a.    Birth control or HRT:  OCP.   Refill no  Last Pap Smear: 09/16/2022.  Any history of abnormal paps: hx of HPV 2021   Last MMG: n/a  Any Medication Refills needed today?: no  Sleep: 7-8 hours.    Diet: balanced.    Exercise: occasional.   Screening labs/Blood work today: no.     Colonoscopy (if over 46 y/o): n/a.   Gardasil:(age 9-46 y/o) no.   Genetic Cancer screen (if indicated): no.   Flu (Aug-April): up to date.TDAP (every 10 years) up to date.      Additional Problems/concerns: Patient 's mom just diagnosis last Aug at 72 and PCP wants to make sure OCP isn't going to be a contributor of her getting cancer.      Next Appt: n/a    Immunization History   Administered Date(s) Administered    Covid-19 Vaccine Pfizer 30 mcg/0.3 ml 02/17/2021, 03/10/2021, 10/06/2021    Covid-19 Vaccine Pfizer Bivalent 30mcg/0.3mL 09/28/2022    DTP 02/19/1985, 11/21/1985    FLULAVAL 6 months & older 0.5 ml Prefilled syringe (25991) 10/24/2019, 10/30/2020, 10/22/2021, 02/28/2023    Mumps 08/09/1985    OPV 11/21/1985    Pneumococcal Conjugate PCV20 03/14/2024    Pneumovax 23 06/08/2018    TDAP 02/28/2023         Current Outpatient Medications:     Norgestim-Eth Estrad Triphasic (ORTHO TRI-CYCLEN LO) 0.18/0.215/0.25 MG-25 MCG Oral Tab, Take 1 tablet by mouth daily., Disp: 84 tablet, Rfl: 3    Phentermine HCl 15 MG Oral Cap, Take 1 capsule (15 mg total) by mouth every morning., Disp: 30 capsule, Rfl: 3    metFORMIN 500 MG Oral Tab, Take 1 tablet (500 mg total) by mouth Q12H., Disp: 180 tablet, Rfl: 1    semaglutide (OZEMPIC, 0.25 OR 0.5 MG/DOSE,) 2 MG/3ML Subcutaneous Solution Pen-injector, Inject 0.25 mg into the skin once a week., Disp: 3 mL, Rfl: 3    methylPREDNISolone (MEDROL) 4  MG Oral Tablet Therapy Pack, As directed., Disp: 1 each, Rfl: 0    ondansetron (ZOFRAN) 4 mg tablet, Take 1 tablet (4 mg total) by mouth every 8 (eight) hours as needed for Nausea., Disp: 10 tablet, Rfl: 3    pantoprazole 40 MG Oral Tab EC, Take 1 tablet (40 mg total) by mouth As Directed. (Patient not taking: Reported on 2023), Disp: , Rfl:     Glucose Blood (ONETOUCH ULTRA) In Vitro Strip, CHECK BLOOD SUGAR TWICE DAILY, Disp: 200 strip, Rfl: 3    fluconazole 200 MG Oral Tab, Take 4 tablets (800 mg total) by mouth daily., Disp: 28 tablet, Rfl: 0    Lancets Does not apply Misc, Check blood sugar twice daily., Disp: 50 each, Rfl: 2    Blood Glucose Monitoring Suppl (ONETOUCH ULTRA 2) w/Device Does not apply Kit, Check blood sugar 2 times daily, Disp: 1 kit, Rfl: 0    Continuous Blood Gluc  (DEXCOM G6 ) Does not apply Device, 1 each by Does not apply route See Admin Instructions., Disp: 1 Device, Rfl: 0    Continuous Blood Gluc Sensor (DEXCOM G6 SENSOR) Does not apply Misc, 1 each by Does not apply route See Admin Instructions., Disp: 9 each, Rfl: 3    Continuous Blood Gluc Transmit (DEXCOM G6 TRANSMITTER) Does not apply Misc, 1 each by Does not apply route See Admin Instructions., Disp: 3 each, Rfl: 3    Multiple Vitamin (MULTIVITAMINS OR), Take by mouth. Bariatric vitamins , Disp: , Rfl:     No Known Allergies    OB History    Para Term  AB Living   0 0 0 0 0 0   SAB IAB Ectopic Multiple Live Births   0 0 0 0 0       Past Medical History:   Diagnosis Date    Abrasion of toe of left foot 10/30/2020    Class 3 severe obesity due to excess calories with serious comorbidity and body mass index (BMI) of 40.0 to 44.9 in adult ([facility]) 2020    Diabetes mellitus, type 2 ([facility]) 2020    Essential hypertension     High blood pressure     High cholesterol     High cholesterol 2019    Hypercholesterolemia with hypertriglyceridemia     Intertrigo 2022    Morbid (severe)  obesity due to excess calories ([facility]) 02/03/2022    Morbid obesity with BMI of 45.0-49.9, adult ([facility]) 02/13/2020    Morbid obesity with BMI of 50.0-59.9, adult ([facility])     S/P gastric bypass 09/14/2020    Uncontrolled type 2 diabetes mellitus with hyperglycemia ([facility]) 06/08/2018    Visual impairment     GLASSES       Past Surgical History:   Procedure Laterality Date    LAP GASTRIC BYPASS/OPAL-EN-Y  09/14/2020    Dr Black, [facility]       Family History   Problem Relation Age of Onset    Diabetes Mother     Breast Cancer Mother     Hypertension Father     Diabetes Father     Other (EToHism. ) Maternal Grandmother     Diabetes Paternal Grandmother     Hypertension Paternal Grandmother     Lipids Paternal Grandmother     Renal Disease Paternal Grandmother     Diabetes Paternal Grandfather         Type 1, IDDM with retinoapthy.     Stroke Paternal Grandfather     Other (PVD) Paternal Grandfather     Other (retinopathy) Paternal Grandfather     Breast Cancer Paternal Aunt                Social History     Socioeconomic History    Marital status: Single     Spouse name: Not on file    Number of children: Not on file    Years of education: Not on file    Highest education level: Not on file   Occupational History    Occupation: HR.    Tobacco Use    Smoking status: Never     Passive exposure: Never    Smokeless tobacco: Never   Vaping Use    Vaping Use: Never used   Substance and Sexual Activity    Alcohol use: Yes     Comment: socially     Drug use: Never    Sexual activity: Not on file   Other Topics Concern    Not on file   Social History Narrative    Not on file     Social Determinants of Health     Financial Resource Strain: Not on file   Food Insecurity: Not on file   Transportation Needs: Not on file   Physical Activity: Not on file   Stress: Not on file   Social Connections: Not on file   Housing Stability: Not on file       Ht 4' 9\" (1.448 m)   LMP 10/31/2023 (Approximate)   Wt Readings from Last 3  Encounters:   03/12/24 135 lb 9.6 oz (61.5 kg)   12/11/23 141 lb (64 kg)   11/29/23 139 lb (63 kg)     Health Maintenance   Topic Date Due    COVID-19 Vaccine (5 - 2023-24 season) 09/01/2023    Influenza Vaccine (1) 10/01/2023    Diabetes Care A1C  01/01/2024    Diabetes Care: GFR  07/01/2024    Diabetes Care Dilated Eye Exam  11/18/2024    Diabetes Care Foot Exam  03/12/2025    Diabetes Care: Microalb/Creat Ratio  03/12/2025    Annual Physical  03/12/2025    Pap Smear  09/16/2025    DTaP,Tdap,and Td Vaccines (4 - Td or Tdap) 02/28/2033    Annual Depression Screening  Completed    Pneumococcal Vaccine: Birth to 64yrs  Completed         Review of Systems   General: Present- Feeling well.  Cardiovascular: Not Present- Chest Pain, Elevated Blood Pressure, Leg Pain and/or Swelling and Shortness of Breath.  Gastrointestinal: Not Present- Nausea and Vomiting.  Female Genitourinary: Not Present- Discharge, Dysmenorrhea, Dysuria, Excessive Menstrual Bleeding and Pelvic Pain.  Musculoskeletal: Not Present- Leg Cramps and Swelling of Extremities.  Neurological: Not Present- Headaches.  Psychiatric: Not Present- Anxiety and Depression.  All other systems negative       Physical Exam   The physical exam findings are as follows:       General   Mental Status - Alert. General Appearance - Well Developed/Well Nourished/No acute distress/ NC/AT.      Note: More than 50% of this visit was spent in counseling or coordinating care for the following reason OCPS at 40 y/o  .      Discussed risks of BC  including risk of blood clots in legs, heart causing heart attack or brain causing a stroke; do not smoke while taking BC as increases risk of clots, nausea and mood disturbances;  (pt denies any family hx of blood clots); caution if any other meds started while she is on BC especially if for contraception as can interfere with efficacy of BC (especially antibiotics) and can increase risk of pregnancy; may elevate BP and would have to  stop if develops HTN; may elevate triglyceride levels/cholesterol; stop if causes severe headaches/migraines, visual changes or jaundice.  May cause nausea, vomiting, spotting or breakthru bleeding; leg/ankle swelling, dark pigment rash on face, breast tenderness, intolerance to contact lenses and gallstones. Also discussed not safe against pregnancy until second pill pack started and how to double up pill if misses a day and how to initiate first pack.     We discussed the risks and side effects associated with birth control including blood clots, nausea, mood disturbances. We talked about the importance of taking the BC at around the same time each day. We discussed what the patient should do if she misses pills. 30% of women will spot in the first month after starting and BC and 10% will in month 3. Pt to notify me if still spotting after 3 months. Patient is aware that if she has significant mood disturbances with this pill that she should notify me and we can change. Pt knows that the pill does not protect her from pregnancy in 100% and it does not protect her against STI. Pt informed that she will need to use other protection in the first month of starting the pill. She is a non-smoker, no FH of  clotting     Pt expressed understanding re: risks and benefits and would like to start med. No barriers to understanding identified.              1. Surveillance for birth control, oral contraceptives

## 2024-04-11 DIAGNOSIS — E11.649 TYPE 2 DIABETES MELLITUS WITH HYPOGLYCEMIA WITHOUT COMA, WITHOUT LONG-TERM CURRENT USE OF INSULIN (HCC): ICD-10-CM

## 2024-05-30 ENCOUNTER — TELEPHONE (OUTPATIENT)
Dept: INTERNAL MEDICINE CLINIC | Facility: CLINIC | Age: 39
End: 2024-05-30

## 2024-05-30 DIAGNOSIS — I10 ESSENTIAL HYPERTENSION: ICD-10-CM

## 2024-05-30 DIAGNOSIS — E66.01 CLASS 3 SEVERE OBESITY DUE TO EXCESS CALORIES WITH SERIOUS COMORBIDITY AND BODY MASS INDEX (BMI) OF 40.0 TO 44.9 IN ADULT (HCC): Primary | ICD-10-CM

## 2024-05-30 DIAGNOSIS — E11.649 TYPE 2 DIABETES MELLITUS WITH HYPOGLYCEMIA WITHOUT COMA, WITHOUT LONG-TERM CURRENT USE OF INSULIN (HCC): ICD-10-CM

## 2024-05-30 DIAGNOSIS — E78.00 HIGH CHOLESTEROL: ICD-10-CM

## 2024-05-30 DIAGNOSIS — Z98.84 S/P GASTRIC BYPASS: ICD-10-CM

## 2024-05-30 NOTE — TELEPHONE ENCOUNTER
----- Message from Mariah SAINI sent at 5/30/2024  5:51 PM CDT -----  Regarding: Referral Needed  Good afternoon,    Can a referral be placed in this patient's chart for Dr. Rex Fishman, Bariatric Department?    Thank you,    Mariah

## 2024-06-01 ENCOUNTER — PATIENT MESSAGE (OUTPATIENT)
Dept: INTERNAL MEDICINE CLINIC | Facility: CLINIC | Age: 39
End: 2024-06-01

## 2024-06-02 RX ORDER — FLUCONAZOLE 150 MG/1
150 TABLET ORAL ONCE
Qty: 1 TABLET | Refills: 0 | Status: SHIPPED | OUTPATIENT
Start: 2024-06-02 | End: 2024-06-02

## 2024-06-02 NOTE — TELEPHONE ENCOUNTER
From: Gisell Zepeda  To: Althea Delgado  Sent: 6/1/2024 9:25 AM CDT  Subject: Medication     Good morning Dr. Delgado,     Can you call in a prescription for Fluconazole. My sugar levels have been a little high and I got a yeast infection again. I have the same exact issues as the last time. If so can you send the request to the [facility] on [address] and [address] in [place], [place]    Gisell

## 2024-06-02 NOTE — TELEPHONE ENCOUNTER
Dr. Delgado, please see patient's MyChart message below and advise on refill of fluconazole, pended for review. Thank you.

## 2024-06-03 ENCOUNTER — OFFICE VISIT (OUTPATIENT)
Dept: SURGERY | Facility: CLINIC | Age: 39
End: 2024-06-03
Payer: COMMERCIAL

## 2024-06-03 ENCOUNTER — LAB ENCOUNTER (OUTPATIENT)
Dept: LAB | Facility: HOSPITAL | Age: 39
End: 2024-06-03
Attending: INTERNAL MEDICINE
Payer: COMMERCIAL

## 2024-06-03 VITALS
HEART RATE: 93 BPM | HEIGHT: 57.2 IN | OXYGEN SATURATION: 100 % | SYSTOLIC BLOOD PRESSURE: 122 MMHG | DIASTOLIC BLOOD PRESSURE: 66 MMHG | WEIGHT: 135.69 LBS | BODY MASS INDEX: 29.27 KG/M2

## 2024-06-03 DIAGNOSIS — E55.9 VITAMIN D DEFICIENCY: ICD-10-CM

## 2024-06-03 DIAGNOSIS — E11.649 TYPE 2 DIABETES MELLITUS WITH HYPOGLYCEMIA WITHOUT COMA, WITHOUT LONG-TERM CURRENT USE OF INSULIN (HCC): Primary | ICD-10-CM

## 2024-06-03 DIAGNOSIS — I10 ESSENTIAL HYPERTENSION: ICD-10-CM

## 2024-06-03 DIAGNOSIS — E44.1 MILD PROTEIN-CALORIE MALNUTRITION (HCC): ICD-10-CM

## 2024-06-03 DIAGNOSIS — E66.9 OBESITY (BMI 30-39.9): ICD-10-CM

## 2024-06-03 DIAGNOSIS — E66.3 OVERWEIGHT (BMI 25.0-29.9): ICD-10-CM

## 2024-06-03 DIAGNOSIS — Z98.84 S/P GASTRIC BYPASS: ICD-10-CM

## 2024-06-03 DIAGNOSIS — E11.649 TYPE 2 DIABETES MELLITUS WITH HYPOGLYCEMIA WITHOUT COMA, WITHOUT LONG-TERM CURRENT USE OF INSULIN (HCC): ICD-10-CM

## 2024-06-03 LAB
EST. AVERAGE GLUCOSE BLD GHB EST-MCNC: 154 MG/DL (ref 68–126)
HBA1C MFR BLD: 7 % (ref ?–5.7)
VIT D+METAB SERPL-MCNC: 29.8 NG/ML (ref 30–100)

## 2024-06-03 PROCEDURE — 3078F DIAST BP <80 MM HG: CPT | Performed by: INTERNAL MEDICINE

## 2024-06-03 PROCEDURE — 84425 ASSAY OF VITAMIN B-1: CPT

## 2024-06-03 PROCEDURE — 99214 OFFICE O/P EST MOD 30 MIN: CPT | Performed by: INTERNAL MEDICINE

## 2024-06-03 PROCEDURE — 82306 VITAMIN D 25 HYDROXY: CPT

## 2024-06-03 PROCEDURE — 3074F SYST BP LT 130 MM HG: CPT | Performed by: INTERNAL MEDICINE

## 2024-06-03 PROCEDURE — 3008F BODY MASS INDEX DOCD: CPT | Performed by: INTERNAL MEDICINE

## 2024-06-03 PROCEDURE — 83036 HEMOGLOBIN GLYCOSYLATED A1C: CPT

## 2024-06-03 PROCEDURE — 36415 COLL VENOUS BLD VENIPUNCTURE: CPT

## 2024-06-03 RX ORDER — PHENTERMINE HYDROCHLORIDE 15 MG/1
15 CAPSULE ORAL EVERY MORNING
Qty: 30 CAPSULE | Refills: 5 | Status: SHIPPED | OUTPATIENT
Start: 2024-06-03

## 2024-06-03 RX ORDER — SEMAGLUTIDE 0.68 MG/ML
0.25 INJECTION, SOLUTION SUBCUTANEOUS WEEKLY
Qty: 6 ML | Refills: 3 | Status: SHIPPED | OUTPATIENT
Start: 2024-06-03 | End: 2024-09-01

## 2024-06-03 NOTE — PROGRESS NOTES
[facility], [facility], [place]  [address]  [facility] [address]  Dept: [phone]    Patient:  Gisell Zepeda  :      1985  MRN:      [id number]    Referring Provider: Althea Delgado MD     Chief Complaint:     Chief Complaint   Patient presents with    Follow - Up    Weight Management     Weight check        Date of Surgery:   2020  Surgery Type:   Laparoscopic gastric bypass surgery     Subjective     Satiety:  positive  Food Intake:  <01 cup(s)  Fluid Intake:  adeq oz  Protein Intake:  adeq grams  Vitamin:  Yes   Bariatric choice  Exercise: Yes  Comorbidities:  SOB/BURRELL-Improvement?  yes, Back pain-Improvement?  yes, Joint pain-Improvement?  yes, Diabetes-Improvement?  yes, Hypertension-Improvement?  yes, TERESE-Improvement?  yes and Snoring-Improvement?  yes    Objective     Vitals: /66   Pulse 93   Ht 4' 9.2\" (1.453 m)   Wt 135 lb 11.2 oz (61.6 kg)   LMP 10/31/2023 (Approximate)   SpO2 100%   BMI 29.16 kg/m²     Starting weight: 233   Current weight:    Interval weight loss: -06   Total weight loss:  -97    Last 3 Weights   24 1212 135 lb 11.2 oz (61.6 kg)   24 1731 135 lb 9.6 oz (61.5 kg)   23 1628 141 lb (64 kg)       Patient Medications:    Current Outpatient Medications:     METFORMIN 500 MG Oral Tab, TAKE 1 TABLET(500 MG) BY MOUTH EVERY 12 HOURS, Disp: 180 tablet, Rfl: 1    Norgestim-Eth Estrad Triphasic (ORTHO TRI-CYCLEN LO) 0.18/0.215/0.25 MG-25 MCG Oral Tab, Take 1 tablet by mouth daily., Disp: 84 tablet, Rfl: 3    Phentermine HCl 15 MG Oral Cap, Take 1 capsule (15 mg total) by mouth every morning., Disp: 30 capsule, Rfl: 3    semaglutide (OZEMPIC, 0.25 OR 0.5 MG/DOSE,) 2 MG/3ML Subcutaneous Solution Pen-injector, Inject 0.25 mg into the skin once a week., Disp: 3 mL, Rfl: 3    methylPREDNISolone (MEDROL) 4 MG Oral Tablet Therapy Pack, As directed., Disp: 1 each, Rfl: 0    ondansetron (ZOFRAN) 4 mg  tablet, Take 1 tablet (4 mg total) by mouth every 8 (eight) hours as needed for Nausea., Disp: 10 tablet, Rfl: 3    pantoprazole 40 MG Oral Tab EC, Take 1 tablet (40 mg total) by mouth As Directed. (Patient not taking: Reported on 11/29/2023), Disp: , Rfl:     Glucose Blood (ONETOUCH ULTRA) In Vitro Strip, CHECK BLOOD SUGAR TWICE DAILY, Disp: 200 strip, Rfl: 3    fluconazole 200 MG Oral Tab, Take 4 tablets (800 mg total) by mouth daily., Disp: 28 tablet, Rfl: 0    Lancets Does not apply Misc, Check blood sugar twice daily., Disp: 50 each, Rfl: 2    Blood Glucose Monitoring Suppl (ONETOUCH ULTRA 2) w/Device Does not apply Kit, Check blood sugar 2 times daily, Disp: 1 kit, Rfl: 0    Continuous Blood Gluc  (DEXCOM G6 ) Does not apply Device, 1 each by Does not apply route See Admin Instructions., Disp: 1 Device, Rfl: 0    Continuous Blood Gluc Sensor (DEXCOM G6 SENSOR) Does not apply Misc, 1 each by Does not apply route See Admin Instructions., Disp: 9 each, Rfl: 3    Continuous Blood Gluc Transmit (DEXCOM G6 TRANSMITTER) Does not apply Misc, 1 each by Does not apply route See Admin Instructions., Disp: 3 each, Rfl: 3    Multiple Vitamin (MULTIVITAMINS OR), Take by mouth. Bariatric vitamins , Disp: , Rfl:     Allergies:  Patient has no known allergies.     Social History:    Social History     Socioeconomic History    Marital status: Single   Occupational History    Occupation: HR.    Tobacco Use    Smoking status: Never     Passive exposure: Never    Smokeless tobacco: Never   Vaping Use    Vaping status: Never Used   Substance and Sexual Activity    Alcohol use: Yes     Comment: socially     Drug use: Never     Surgical History:    Past Surgical History:   Procedure Laterality Date    Lap gastric bypass/nayan-en-y  09/14/2020    Dr Black, [facility]       Family History:    Family History   Problem Relation Age of Onset    Diabetes Mother     Breast Cancer Mother     Hypertension Father      Diabetes Father     Other (EToHism. ) Maternal Grandmother     Diabetes Paternal Grandmother     Hypertension Paternal Grandmother     Lipids Paternal Grandmother     Renal Disease Paternal Grandmother     Diabetes Paternal Grandfather         Type 1, IDDM with retinoapthy.     Stroke Paternal Grandfather     Other (PVD) Paternal Grandfather     Other (retinopathy) Paternal Grandfather     Breast Cancer Paternal Aunt        Physical Exam:  Vital signs: /66   Pulse 93   Ht 4' 9.2\" (1.453 m)   Wt 135 lb 11.2 oz (61.6 kg)   LMP 10/31/2023 (Approximate)   SpO2 100%   BMI 29.16 kg/m²   General appearance: alert, appears stated age, cooperative and mildly obese  Head: Normocephalic, without obvious abnormality, atraumatic  Eyes: conjunctivae/corneas clear. PERRL, EOM's intact. Fundi benign.  Neck: no adenopathy, no carotid bruit, no JVD, supple, symmetrical, trachea midline and thyroid not enlarged, symmetric, no tenderness/mass/nodules  Back: symmetric, no curvature. ROM normal. No CVA tenderness.  Lungs: clear to auscultation bilaterally  Heart: S1, S2 normal, no murmur, click, rub or gallop, regular rate and rhythm  Abdomen:  incicisons are healing well  Extremities: extremities normal, atraumatic, no cyanosis or edema  Pulses: 2+ and symmetric  Neurologic: Grossly normal       ROS:    Constitutional: negative  Respiratory: negative  Cardiovascular: negative  Gastrointestinal: negative  Musculoskeletal:negative  Neurological: negative  Behavioral/Psych: negative  Endocrine: negative  All other systems were reviewed and are negative    Assessment     DIABETES:  The patient's blood sugars were reviewed with the patient with averages ranging from .  The patient denies any episodes of hypoglycemia since her last clinic visit.  she denies any lower extremity skin breakdown or foot ulcers.    HYPERTENSION:  The patient's blood pressure has been well controlled.  she has been checking it as instructed and  has remained in relatively good control.    HYPERCHOLESTEROLEMIA:  The patient states that her cholesterol has been well controlled on her current medication.    Lab Results   Component Value Date/Time    CHOLEST 186 07/01/2023 07:32 AM     (H) 07/01/2023 07:32 AM    HDL 68 (H) 07/01/2023 07:32 AM    TRIG 71 07/01/2023 07:32 AM    VLDL 12 07/01/2023 07:32 AM       Encounter Diagnosis(ses):   Encounter Diagnoses   Name Primary?    Type 2 diabetes mellitus with hypoglycemia without coma, without long-term current use of insulin (HCC) Yes    Mild protein-calorie malnutrition (HCC)     Essential hypertension     S/P gastric bypass     Overweight (BMI 25.0-29.9)        Plan     S/P Pancho en y 09/14/2020    Doing well    DM2:  Blood sugars elevated after she goes for a walk  continue metformin 500 BID  Ozempic 0.25 mg weekly.     Continue to work out as scheduled    Follow up with RD and surgical team as scheduled    Bariatric labs due      PHENTERMINE: tolerating well  ekg done         abdominoplasty with muscle repair/lipo in [facility]  breast lift and arms done (Aug)  May get leg lift       No orders of the defined types were placed in this encounter.        Rex Fishman MD

## 2024-06-07 LAB — VITAMIN B1 WHOLE BLD: 101.4 NMOL/L

## 2024-09-17 ENCOUNTER — TELEPHONE (OUTPATIENT)
Dept: CASE MANAGEMENT | Age: 39
End: 2024-09-17

## 2024-09-17 DIAGNOSIS — S62.616A CLOSED DISPLACED FRACTURE OF PROXIMAL PHALANX OF RIGHT LITTLE FINGER, INITIAL ENCOUNTER: Primary | ICD-10-CM

## 2024-09-17 NOTE — TELEPHONE ENCOUNTER
Dr Delgado,     Patient was in ER in [facility] and she needs a referral to an orthopedic.    Pended referral please review diagnosis and sign off if you agree.    Thank you.  Tia Sandoval  Managed Care

## 2024-09-20 ENCOUNTER — TELEPHONE (OUTPATIENT)
Dept: ORTHOPEDICS CLINIC | Facility: CLINIC | Age: 39
End: 2024-09-20

## 2024-09-20 NOTE — TELEPHONE ENCOUNTER
Spoke with patient and she states on 9/16 she was practicing with a crossbow and she was cranking it and her right 5th finger got wrapped up in it. She went to [facility] and had XR and was told fx and needs surgery and put in temp splint. Patient has HMO so wanted to Follow up in network. Appointment scheduled on 9/23 at 915am with Dr Mayfield at [facility]. Address provided. Patient was not given a disc with images, and I recommended contacting the hospital to see if they can make her a disc and bring with to appointment on 9/23. She verbalized understanding.

## 2024-09-20 NOTE — TELEPHONE ENCOUNTER
Patient was in [facility] on 9/17 for a pinky fracture. She was told she needs surgery/a pin placed but needed a referral to a provider in her network. Patient has referral but next available appointment is not until 9/27. Please call.

## 2024-09-23 ENCOUNTER — OFFICE VISIT (OUTPATIENT)
Dept: ORTHOPEDICS CLINIC | Facility: CLINIC | Age: 39
End: 2024-09-23
Payer: COMMERCIAL

## 2024-09-23 ENCOUNTER — TELEPHONE (OUTPATIENT)
Dept: ORTHOPEDICS CLINIC | Facility: CLINIC | Age: 39
End: 2024-09-23

## 2024-09-23 ENCOUNTER — HOSPITAL ENCOUNTER (OUTPATIENT)
Dept: GENERAL RADIOLOGY | Age: 39
Discharge: HOME | End: 2024-09-23
Attending: STUDENT IN AN ORGANIZED HEALTH CARE EDUCATION/TRAINING PROGRAM
Payer: COMMERCIAL

## 2024-09-23 DIAGNOSIS — S62.616A CLOSED DISPLACED FRACTURE OF PROXIMAL PHALANX OF RIGHT LITTLE FINGER, INITIAL ENCOUNTER: ICD-10-CM

## 2024-09-23 DIAGNOSIS — T14.8XXA FRACTURE: Primary | ICD-10-CM

## 2024-09-23 DIAGNOSIS — S62.616A CLOSED DISPLACED FRACTURE OF PROXIMAL PHALANX OF RIGHT LITTLE FINGER, INITIAL ENCOUNTER: Primary | ICD-10-CM

## 2024-09-23 DIAGNOSIS — T14.8XXA FRACTURE: ICD-10-CM

## 2024-09-23 PROCEDURE — 73140 X-RAY EXAM OF FINGER(S): CPT | Performed by: STUDENT IN AN ORGANIZED HEALTH CARE EDUCATION/TRAINING PROGRAM

## 2024-09-23 NOTE — TELEPHONE ENCOUNTER
Ortho Surgery Request  Surgery: Right small finger proximal phalanx closed reduction percutaneous pinning versus possible open reduction internal fixation      Surgical Assistant: Sudeep Mckenzie  Surgery Day Request: 9/26/24  Estimated Procedure Length: 30 min  Anesthesia type: General   Position: Supine with hand table   Special Needs:[x]Mini C-Arm   Equipment: Power, K-wires, bipolar available  Location:Main OR  Post Op Visit Date: 2 weeks  CPT Code:  24486     ICD Code:S62.616  Medical Clearance Needed: NA  Preadmission Testing Orders:  Anesthesia testing protocols and anesthesia pre op orders will be used  Additional PAT orders:  Pre OP Orders:  Use [facility] procedure driven order set in addition to anesthesia protocol  Additional Pre Op Orders:  PCN Allergy:  No  If Yes:   __X__  Admit:  Hospital outpatient surgery

## 2024-09-23 NOTE — TELEPHONE ENCOUNTER
Spoke with patient to inform her that her surgery with Dr. Mayfield will be scheduled for 9/25. She was informed that our pre admission team will give her a call to let her know what time she needs to be in for surgery. Patient verbalized understanding

## 2024-09-23 NOTE — H&P
Orthopaedic Surgery New Patient Visit  _____________________________________________________________________________________________________  _____________________________________________________________________________________________________    DATE OF VISIT: 9/23/2024     CHIEF COMPLAINT:   Chief Complaint   Patient presents with    Injury     Right hand - onset last Monday while she had a crossbow in her hand and when she left the string it injured her hand - was in ER at [facility] next day and had x-rays - we have the report - was told she has a R 5th finger fx - has a soft splint on - she still has pain rated as 3-9/10 on and off - no more numbness - pt is R handed         HISTORY OF PRESENT ILLNESS: Gisell Zepeda is a 39 year old female who presents to the clinic for her right hand.  She reports that she sustained an injury to her right hand on 16 September when she was loading a crossbow and the bowstring unloaded, taking her finger with it.  This resulted in immediate pain and deformity of the right small finger.  She had swelling throughout the hand and wrist that have somewhat improved.  She has been immobilized in a splint since the time of the injury.  She denies any neurologic symptoms.  She reports pain is made worse with movement and activity and having the hand in a dependent position.  It is made slightly better with ibuprofen and acetaminophen.  She is right-hand dominant.  Pain is approximately 3-9/10 depending on activities and position    SOCIAL HISTORY  Social History     Socioeconomic History    Marital status: Single     Spouse name: Not on file    Number of children: Not on file    Years of education: Not on file    Highest education level: Not on file   Occupational History    Occupation: HR.    Tobacco Use    Smoking status: Never     Passive exposure: Never    Smokeless tobacco: Never   Vaping Use    Vaping status: Never Used   Substance and Sexual Activity    Alcohol use: Yes      Comment: socially     Drug use: Never    Sexual activity: Not on file   Other Topics Concern    Not on file   Social History Narrative    Not on file     Social Determinants of Health     Financial Resource Strain: Not on file   Food Insecurity: Not on file   Transportation Needs: Not on file   Physical Activity: Not on file   Stress: Not on file   Social Connections: Not on file   Housing Stability: Not on file        PAST MEDICAL HISTORY  Past Medical History:    Abrasion of toe of left foot    Class 3 severe obesity due to excess calories with serious comorbidity and body mass index (BMI) of 40.0 to 44.9 in adult ([facility])    Diabetes mellitus, type 2 ([facility])    Essential hypertension    High blood pressure    High cholesterol    High cholesterol    Hypercholesterolemia with hypertriglyceridemia    Intertrigo    Morbid (severe) obesity due to excess calories ([facility])    Morbid obesity with BMI of 45.0-49.9, adult ([facility])    Morbid obesity with BMI of 50.0-59.9, adult ([facility])    S/P gastric bypass    Uncontrolled type 2 diabetes mellitus with hyperglycemia ([facility])    Visual impairment    GLASSES        PAST SURGICAL HISTORY  Past Surgical History:   Procedure Laterality Date    Lap gastric bypass/nayan-en-y  09/14/2020    Dr Black, [facility]        MEDICATIONS  * Reviewed  No outpatient medications have been marked as taking for the 9/23/24 encounter (Office Visit) with Luis Mayfield MD.        ALLERGIES  No Known Allergies     FAMILY HISTORY  Family History   Problem Relation Age of Onset    Diabetes Mother     Breast Cancer Mother     Hypertension Father     Diabetes Father     Other (EToHism. ) Maternal Grandmother     Diabetes Paternal Grandmother     Hypertension Paternal Grandmother     Lipids Paternal Grandmother     Renal Disease Paternal Grandmother     Diabetes Paternal Grandfather         Type 1, IDDM with retinoapthy.     Stroke Paternal Grandfather     Other (PVD) Paternal Grandfather     Other (retinopathy)  Paternal Grandfather     Breast Cancer Paternal Aunt         REVIEW OF SYSTEMS  A 14 point review of systems was performed. Pertinent positives and negatives noted in the HPI.    PHYSICAL EXAM  LMP 10/31/2023 (Approximate)      Constitutional: The patient is well-developed, well-nourished, in no acute distress.  Neurological: Alert and oriented to person, place, and time.  Psychiatric: Mood and affect normal.  Head: Normocephalic and atraumatic.  Cardiovascular: regular rate by palpation  Pulmonary/Chest: Effort normal. No respiratory distress. Breathing non-labored  Abdominal: Abdomen exhibits no distension.   Right  Hand  Inspection/Palpation  No readily apparent visual abnormalities of the hand/fingers  Fingers postured in a neutral position  No ecchymosis or erythema  Moderate soft tissue edema and small finger and hypothenar region of hand  Mild joint effusion   No breaks in skin. Skin is euthermic  Focally tender to palpation to right small finger  ROM  Small finger MCP: 70 degrees flexion - 10 degrees extension  Small finger PIP: 60 degrees flexion - 10 degrees extension  Small finger DIP: 70 degrees flexion - 0 degrees extension  Unable to make full composite fist  Motor  Fires AIN/PIN/U/M/recurrent motor branch   Strength: Deferred  Finger abduction: Deferred  Sensory  SILT R/U/M distributions  Radial/ulnar digital nerve distribution intact to all fingers  2+ radial pulse, negative herman's test, <2s capillary refill in all digits       RESULTS    Lab Results   Component Value Date    WBC 6.2 09/16/2022    HGB 12.2 09/16/2022    .0 09/16/2022      Lab Results   Component Value Date     (H) 07/01/2023    BUN 21 (H) 07/01/2023    CREATSERUM 0.46 (L) 07/01/2023    GFRNAA 116 10/22/2021    GFRAA 133 10/22/2021        IMAGING  I independently viewed and interpreted the imaging. Radiologist interpretation is available in the imaging report.  X-ray: Plain films of the right small finger  demonstrate comminuted, intra-articular fracture of the proximal phalanx of the right small finger with ulnar-sided displaced fragment at the joint surface and metaphyseal transverse component.  The fracture is displaced with apex volar angulation    XR FINGER(S) (MIN 2 VIEWS), RIGHT 5TH (CPT=73140)    Result Date: 9/23/2024  PROCEDURE: XR FINGER(S) (MIN 2 VIEWS), RIGHT 5TH (CPT=73140)  COMPARISON: None.  INDICATIONS: Follow up fracture of the 5th right finger.  TECHNIQUE: 3 views were obtained.   FINDINGS:  BONES: A comminuted fracture proximal phalanx of small finger with mild palmar radial displacement, and medial impaction.  Fracture extends into the medial 4th of the 5th MCP joint.  Fifth MCP joint is congruent.  OTHER: Negative.         CONCLUSION:  1. Comminuted intra-articular fracture proximal phalanx of right small finger.    Dictated by (CST): Davidson Monaco MD on 9/23/2024 at 9:45 AM     Finalized by (CST): Davidson Monaco MD on 9/23/2024 at 9:47 AM             ASSESSMENT/PLAN: Gisell Zepeda is a 39 year old female who presents to the Orthopaedic surgery clinic today for right small finger P1 base fracture, intra-articular, closed, displaced, distally neurovascular intact.  The management options for this process were discussed with the patient to include conservative and surgical options. Given the patient's age, functional status, and based on the fracture pattern, the angulation, and subtle malrotation, I recommend operative intervention.      - General risks of surgery reviewed including risks of pain, bleeding, infection, scarring, damage to surrounding structures, need for further procedures, blood transfusion, VTE, risks of anesthesia, failure to improve symptoms, and recurrence of disease.    -  Patient will be placed on ERAS pathway. Written and verbal pre-op instructions given, including information regarding pre-op diet, utilization of chlorhexidine, and expectations for post-op activity.  Discussed expected course of recovery and post-op activity restrictions.  - Discussed discharge pathway.  - Discussed postoperative pain management and expectation that patient may require narcotic medication as an adjunct to multi-modal analgesic therapy.  - All questions were answered and patient desires to proceed.   - Plan to sign consents on the day of surgery.   - Preoperative testing needed: UPT on day of surgery  - See below for specific surgical planning details:      Surgical Planning:  Procedure: Right small finger P1 base CRPP  PMH considerations: DM2  PSH considerations: History of gastric bypass  Drug Allergies: NKDA  Anesthesia issues:  no FHx or personal problems with anesthesia  Planned positioning: Supine with hand table  Bleeding Issues:  no personal of FHx of bleeding or clotting problems   Pre-op consultations needed: None  Additional pre-op imaging needed: None  Counseled for smoking cessation to improve OR outcome: N/A  Special equipment needed in the operating room: Mini C arm  Post-op pain regimen:  icing, elevation, Tylenol, celebrex, gabapentin, oxyodone  Informed Consent: to be signed on day of surgery    I have personally seen Gisell Zepeda and discussed in detail their plan of care. Prior to departure, they indicated agreement with and understanding of their plan of care and their follow-up as documented herein this note. Please note that this note was written in combination with voice recognition/dictation software and there is a possibility of transcription errors which were not identified at the time of note submission. If clarification is necessary, please contact the author or clinic staff.    Luis Mayfield MD  Orthopaedic Surgery  9/23/2024      Rationale for 57 Modifier Addendum    Decision for Major Surgery  The decision for a major surgery was made during this visit/consultation based on review and discussion of the history of presentation, examination, available  labs/imaging, and the patient's functional status. The medical and surgical history were considered with regards to risk and potential modifications needed.

## 2024-09-23 NOTE — TELEPHONE ENCOUNTER
Type of surgery:  Right small finger proximal phalanx closed reduction percutaneous pinning versus possible open reduction internal fixation  Date: 9/25/24  Location: [facility]  Medical Clearance: No     *Medical:      *Dental:      *Other:  Prior Authorization Status: Stat  Workers Comp:  Medacta/Sathish:  Holliday: Yes  POV: 10/11/24

## 2024-09-23 NOTE — PATIENT INSTRUCTIONS
EXTREMITY FRACTURE INFORMATION PACKET  Dr. Luis Mayfield  Note: this information packet goes into detail regarding surgical treatment, but is also relevant to understanding your diagnosis and non-operative treatment options  FRACTURES  Fractures, or broken bones, are common and occur in people of all ages. They may be a result of overuse (stress fracture), a fault in the bone (pathologic fracture), or from trauma such as a fall, sporting injury, or motor vehicle collision. While any bone in the body may be fractured, the most common ones we see and treat are in the arms and legs near the joints (e.g., wrists, elbows, shoulder, hip, ankle). This is because the bone in these regions is less dense and more susceptible to injury.    When fractures occur, they are typically very painful. There are nerves in/around the bones that are damaged when the bone breaks. Additionally, there are blood vessels in the bones that are damaged by a fracture and cause a large amount of swelling in the area around the injury, which contrinutes to more pain even after the bone is placed into a splint or sling.     WORKUP OF FRACTURES  Your physician will perform a history and physical examination. This examination will help to identify risk factors for injury/reinjury and can identify other injuries. Your examination will include assessment of your motion, strength, and the function of your nerves.   You will often have X-rays performed before you see your Orthopaedic surgeon, even if others were recently taken. These X-rays will help us to assess the position of the bone, whether there are any other fractures or dislocations, and whether the bones have moved since your last X-rays. While X-rays do not show the muscles, ligaments, and tendons, some signs on these X-rays can also be used to identify an injury to these structures. Your surgeon may also order a computed tomography (CT) scan to look at the joint surface or for operative  planning. These scans are not required in all patients.                   X-Ray                       CT Scan       Repair  Tibial Plateau (Knee) Fracture      TREATMENT  The treatment for a fracture depends on several factors including the fracture location, the fracture pattern (what the X-rays look like), your age/health and risk factors, and your activity level/functional needs. Your surgeon should have a discussion with you about all of these to help you come to the decision of whether to undergo surgery. When treated without surgery, your bone will be immobilized in a sling, splint, or cast to keep it from moving, which helps decrease pain and accelerate healing. These will generally remain in place until your bone is healed/stable enough to discontinue the cast/sling, which may take several weeks to months. If fractures do not heal appropriately through this treatment, a surgery may still become necessary.      X-Ray          Repair  Distal Humerus (Elbow) Fracture  Surgery for fractures may include using removable wires to pin the bone into an appropriate position, using metal plates and screws or rods to hold the bone in position, or replacing the affected joint. These treatment options are typically based on the fracture pattern, the blood supply to the joint, and your rehabilitation needs.   Some common fractures and treatments are discussed below.    ANKLE FRACTURES                                      X-Ray                                    Repair  Ankle fractures are typically fractures of the lower portion of the fibula but can involve the tibia and frequently include injury to the ligaments of your ankle. These injuries frequently occur with a twisting motion like a bad sprain, and some can be treated very similar to an ankle sprain. In fact, there are several types of ankle fracture that can be treated with a walking boot or cast. When treated without surgery, you may be in a boot or cast for about  6-8 weeks before starting to transition back to normal walking, though this can vary. As these increase in severity (see above), the ankle joint becomes unstable and a cast may not be sufficient to help the joint stay in position. If the fracture involves the joint surface of the ankle, these are called pilon fractures and frequently necessitate surgery to keep the joint surface smooth and prevent rapid progression pf arthritis. In these settings, your surgeon will often recommend open reduction and internal fixation of the fracture. Your surgeon will have you wait 2-3 weeks after injury to have ankle fracture surgery to give time for swelling to decrease. Too much swelling will lead to healing issues in the lower leg.  During the surgery, your surgeon will correct the bone alignment, place a plate and screws, assess the ligaments, and repair/secure these as needed.     DISTAL RADIUS/WRIST FRACTURES    X-Rays of repaired distal radius  Fractures of the distal radius are grouped as those that enter the joint surface (intra-articular) and those that do not (extra-articular). Extra-articular/non-joint fractures can frequently be managed without surgery as long as the overall alignment of your wrist is reasonable. Intra-articular fractures are typically managed with surgery to protect function and prevent wrist instability or progression to arthritis. However, in persons who are older (there is not a specific agreed-upon age range) and have less functional demands on their hands, these do just as well without surgery. When non-operative management is performed, you will usually remain in a splint for the first 2 weeks after injury and then switch over to a cast when the swelling has decreased. You will typically be in a splint or cast for 6-8 weeks from the time of injury, with very limited functionality during that time. When surgery is performed, your surgeon will correct the bone alignment, place a plate and screws  and/or pins, and assess the ligaments and repair/secure these as needed. After the surgery, you will typically be placed in a wrist splint for 2 weeks and then transition to a removable splint to be worn when outside. With both operative and nonoperative management, the wrist will frequently become stiff and you may need therapy to help restore motion.    PROXIMAL HUMERUS/SHOULDER FRACTURES    X-Ray         Repair with plate/screws        X-Ray             Repair with intramedullary noni  Fractures of the proximal (upper) humerus are most commonly managed without surgery unless the fractured parts are substantially displaced in a way that will compromise your function even when healed. When managed without surgery, you will usually be placed in a sling for about 8 weeks with therapy to help protect your motion and progress your function. If your shoulder bones are severely displaced, the blood supply is injured, or your shoulder is both fractured and dislocated (moved out of its socket), your surgeon may recommend surgery. Depending on the fracture and the quality of your bone, your injury can be addressed with pinning, plates and screws, an intramedullary noni, or a shoulder replacement. Shoulder replacements are a good option for persons with pre-existing shoulder pain or fractures with many pieces too small to hold a screw. The recovery protocol for each of these procedures varies and your surgeon will discuss these with you in detail.    PROXIMAL HUMERUS/SHOULDER FRACTURES      X-Ray          Repair (Side view)   Repair (Front view)  Fractures of the shaft of the humerus are also commonly managed without surgery except for when the nerve is injured, the fracture is severely out of alignment, or when the fracture is still unstable after 6 weeks. Additionally, distal fractures (lower end) often require surgery. When managed without surgery, you will usually be placed in a splint initially and then transitioned to a  fracture brace (sometimes called a Herring brace) for a period of 8-10 weeks. You will also work with therapy to help protect your motion and progress your function. At about 6 weeks from injury, you should no longer have motion between your fracture ends (the upper and lower part of the arm)--if you still do, it's unlikely that the bone will heal.  In this setting and with severe displacement of the fracture, your surgeon may recommend surgery. Most often, your injury can be addressed with plates and screws or an intramedullary noni followed by a splint for 2 weeks and then usually a return to weightbearing and motion after your wound check.    RISKS OF SURGERY  Fracture surgeries are overall safe, but there are some risks to be aware of. Some of these risks depend on the type of anesthesia used as some patients may react to certain types of anesthesia. Additionally, while these are risks of surgery, there are many similar risks with nonoperative management, particularly including persistent pain and failure of the bone to heal appropriately. Potential risks of the surgery include:  Pain - Some amount of pain is normal after any surgery but should be manageable with your post-operative pain protocol. While this pain almost always resolves within the first few weeks, some activities may remain bothersome and painful until your rehabilitation process is nearly complete. If you have severe pain not controlled on your postoperative pain regimen, you should let your surgeon know.  Infection - Infection after fracture surgery is uncommon (<3%) but, when it does occur, is a major issue. Sometimes this can be managed with a brief course of antibiotics. However, in other cases, the fracture site may need to undergo one or more procedures to clean out the infection. If the infection has been present for multiple weeks, it may require a complete replacement of all the implants, which is a large surgery and will set back your  healing. To help avoid this risk, on the day of surgery, you will be given antibiotics and your skin and bone will be thoroughly cleaned in the operating room. For arthroplasty surgeries, your surgeon will also place additional cleaning solution and antibiotics into the wound unless you have an allergy to these.  Bleeding - Any time an incision is made to the skin, there will be some bleeding. You will typically lose a very small amount during this surgery.  Nerve Injuries - Injuries to the nerves are uncommon but very serious. The nerves are delicate and sensitive and can become injured even without cutting them, such as when traction is put on them to pull the out of the way. During fracture repair procedures, your surgeon will operate in close proximity to the nerves and will take care to avoid injury to them. While many times, injured nerves may recover to some extent, you may be referred to a nerve specialist if an injury occurs to check on the healing and anticipated recovery.  Stiffness - Any time a surgery is performed, the body attempts to heal by creating scar tissue. While this is very important to the process of healing, it also sets the joint near the fracture up for stiffness. This stiffness is exacerbated by the use of the splint/sling to protect your limb during early healing. Too much scar tissue will cause pain and loss of motion. It will be important to work with your therapist to progress motion to prevent stiffness while protecting your surgery. If substantial stiffness does occur, additional interventions such as steroid injections and/or manipulation can be performed.   Delayed Union and Nonunion - Occasionally the fracture may not heal after surgery. If this occurs, you may work with your surgeon to determine the cause of the nonunion and whether additional surgical interventions are necessary. To minimize the risk, you should carefully adhere to the rehabilitation protocol, eat a nutritious  and well-balanced diet, and avoid tobacco use.    REHABILITATION AFTER SURGERY  The course of recovery following surgery depends somewhat upon the type of procedure the surgeon performs. All patients will have some amount of pain from the surgery that will be managed with medications, ice, and elevation. Depending on your occupation, commute, and demands, you may be able to return to work within days to weeks after surgery. However, it may take several months for your fractured extremity to feel normal again.      MEDICATIONS AFTER FRACTURES AND SURGERY  Your medication regimen for fractures treated with or without surgery will include medications for pain, nausea, and bowel health. Blood clots after fractures are common so your surgeon will typically put you on a course of anticoagulation to decrease this risk. You may be given a medication to help protect your stomach while taking aspirin unless you are already taking one. To minimize narcotic use and establish better pain control, we employ a multimodal pain approach. This protocol combines the use of scheduled acetaminophen, gabapentin, and narcotics.  We will often use anti-inflammatories (NSAID's such as ibuprofen, celecoxib, and/or naproxen) to further assist with pain control thus allowing for a lower dose of narcotic to be used. Dosing of medications will vary from patient to patient based on their needs and past medical history, so please refer to your medication list. In general, you will also receive vitamin D to improve bone health and bone density, which may also help with healing and protect from future fractures. If you take any other medications at baseline, please discuss these with your surgeon, pharmacist, or primary care manager.     ICE PACKS AFTER FRACTURES AND SURGERY   Icing can be helpful after both fractures and fracture surgery to alleviate pain and swelling. If using an ice pack, you should apply this frequently in the first week after  injury/surgery. If you receive a nerve block from anesthesia (for a surgery), ensure you can feel the skin (the block has worn off) to prevent frostbite injuries. Ice bags/packs should be used for about 20 minutes every 3-4 hours during waking hours and should not be left on when sleeping. It may be used more often at first if pain/inflammation are intense. It is often helpful to protect your skin from frostbite with a washcloth, towel, or ace wrap between the ice bag/pack and your skin.    INFORMATION ABOUT YOUR SURGEON  Dr. Luis Mayfield is an Orthopaedic surgeon with advanced skills in orthopaedic sports, trauma, and upper limb surgery. He completed his training at [facility]. He has authored over 50 peer-review papers and several book chapters advancing surgical techniques, surgeon education, and patient treatment. He is an assistant professor of surgery at the [employer] and [employer].                         X-Ray           Repair  Clavicle (Collar Bone) Fracture

## 2024-09-23 NOTE — H&P (VIEW-ONLY)
Orthopaedic Surgery New Patient Visit  _____________________________________________________________________________________________________  _____________________________________________________________________________________________________    DATE OF VISIT: 9/23/2024     CHIEF COMPLAINT:   Chief Complaint   Patient presents with    Injury     Right hand - onset last Monday while she had a crossbow in her hand and when she left the string it injured her hand - was in ER at [facility] next day and had x-rays - we have the report - was told she has a R 5th finger fx - has a soft splint on - she still has pain rated as 3-9/10 on and off - no more numbness - pt is R handed         HISTORY OF PRESENT ILLNESS: Gisell Zepeda is a 39 year old female who presents to the clinic for her right hand.  She reports that she sustained an injury to her right hand on 16 September when she was loading a crossbow and the bowstring unloaded, taking her finger with it.  This resulted in immediate pain and deformity of the right small finger.  She had swelling throughout the hand and wrist that have somewhat improved.  She has been immobilized in a splint since the time of the injury.  She denies any neurologic symptoms.  She reports pain is made worse with movement and activity and having the hand in a dependent position.  It is made slightly better with ibuprofen and acetaminophen.  She is right-hand dominant.  Pain is approximately 3-9/10 depending on activities and position    SOCIAL HISTORY  Social History     Socioeconomic History    Marital status: Single     Spouse name: Not on file    Number of children: Not on file    Years of education: Not on file    Highest education level: Not on file   Occupational History    Occupation: HR.    Tobacco Use    Smoking status: Never     Passive exposure: Never    Smokeless tobacco: Never   Vaping Use    Vaping status: Never Used   Substance and Sexual Activity    Alcohol use: Yes      Comment: socially     Drug use: Never    Sexual activity: Not on file   Other Topics Concern    Not on file   Social History Narrative    Not on file     Social Determinants of Health     Financial Resource Strain: Not on file   Food Insecurity: Not on file   Transportation Needs: Not on file   Physical Activity: Not on file   Stress: Not on file   Social Connections: Not on file   Housing Stability: Not on file        PAST MEDICAL HISTORY  Past Medical History:    Abrasion of toe of left foot    Class 3 severe obesity due to excess calories with serious comorbidity and body mass index (BMI) of 40.0 to 44.9 in adult ([facility])    Diabetes mellitus, type 2 ([facility])    Essential hypertension    High blood pressure    High cholesterol    High cholesterol    Hypercholesterolemia with hypertriglyceridemia    Intertrigo    Morbid (severe) obesity due to excess calories ([facility])    Morbid obesity with BMI of 45.0-49.9, adult ([facility])    Morbid obesity with BMI of 50.0-59.9, adult ([facility])    S/P gastric bypass    Uncontrolled type 2 diabetes mellitus with hyperglycemia ([facility])    Visual impairment    GLASSES        PAST SURGICAL HISTORY  Past Surgical History:   Procedure Laterality Date    Lap gastric bypass/nayan-en-y  09/14/2020    Dr Black, [facility]        MEDICATIONS  * Reviewed  No outpatient medications have been marked as taking for the 9/23/24 encounter (Office Visit) with Luis Mayfield MD.        ALLERGIES  No Known Allergies     FAMILY HISTORY  Family History   Problem Relation Age of Onset    Diabetes Mother     Breast Cancer Mother     Hypertension Father     Diabetes Father     Other (EToHism. ) Maternal Grandmother     Diabetes Paternal Grandmother     Hypertension Paternal Grandmother     Lipids Paternal Grandmother     Renal Disease Paternal Grandmother     Diabetes Paternal Grandfather         Type 1, IDDM with retinoapthy.     Stroke Paternal Grandfather     Other (PVD) Paternal Grandfather     Other (retinopathy)  Paternal Grandfather     Breast Cancer Paternal Aunt         REVIEW OF SYSTEMS  A 14 point review of systems was performed. Pertinent positives and negatives noted in the HPI.    PHYSICAL EXAM  LMP 10/31/2023 (Approximate)      Constitutional: The patient is well-developed, well-nourished, in no acute distress.  Neurological: Alert and oriented to person, place, and time.  Psychiatric: Mood and affect normal.  Head: Normocephalic and atraumatic.  Cardiovascular: regular rate by palpation  Pulmonary/Chest: Effort normal. No respiratory distress. Breathing non-labored  Abdominal: Abdomen exhibits no distension.   Right  Hand  Inspection/Palpation  No readily apparent visual abnormalities of the hand/fingers  Fingers postured in a neutral position  No ecchymosis or erythema  Moderate soft tissue edema and small finger and hypothenar region of hand  Mild joint effusion   No breaks in skin. Skin is euthermic  Focally tender to palpation to right small finger  ROM  Small finger MCP: 70 degrees flexion - 10 degrees extension  Small finger PIP: 60 degrees flexion - 10 degrees extension  Small finger DIP: 70 degrees flexion - 0 degrees extension  Unable to make full composite fist  Motor  Fires AIN/PIN/U/M/recurrent motor branch   Strength: Deferred  Finger abduction: Deferred  Sensory  SILT R/U/M distributions  Radial/ulnar digital nerve distribution intact to all fingers  2+ radial pulse, negative herman's test, <2s capillary refill in all digits       RESULTS    Lab Results   Component Value Date    WBC 6.2 09/16/2022    HGB 12.2 09/16/2022    .0 09/16/2022      Lab Results   Component Value Date     (H) 07/01/2023    BUN 21 (H) 07/01/2023    CREATSERUM 0.46 (L) 07/01/2023    GFRNAA 116 10/22/2021    GFRAA 133 10/22/2021        IMAGING  I independently viewed and interpreted the imaging. Radiologist interpretation is available in the imaging report.  X-ray: Plain films of the right small finger  demonstrate comminuted, intra-articular fracture of the proximal phalanx of the right small finger with ulnar-sided displaced fragment at the joint surface and metaphyseal transverse component.  The fracture is displaced with apex volar angulation    XR FINGER(S) (MIN 2 VIEWS), RIGHT 5TH (CPT=73140)    Result Date: 9/23/2024  PROCEDURE: XR FINGER(S) (MIN 2 VIEWS), RIGHT 5TH (CPT=73140)  COMPARISON: None.  INDICATIONS: Follow up fracture of the 5th right finger.  TECHNIQUE: 3 views were obtained.   FINDINGS:  BONES: A comminuted fracture proximal phalanx of small finger with mild palmar radial displacement, and medial impaction.  Fracture extends into the medial 4th of the 5th MCP joint.  Fifth MCP joint is congruent.  OTHER: Negative.         CONCLUSION:  1. Comminuted intra-articular fracture proximal phalanx of right small finger.    Dictated by (CST): Davidson Monaco MD on 9/23/2024 at 9:45 AM     Finalized by (CST): Davidson Monaco MD on 9/23/2024 at 9:47 AM             ASSESSMENT/PLAN: Gisell Zepeda is a 39 year old female who presents to the Orthopaedic surgery clinic today for right small finger P1 base fracture, intra-articular, closed, displaced, distally neurovascular intact.  The management options for this process were discussed with the patient to include conservative and surgical options. Given the patient's age, functional status, and based on the fracture pattern, the angulation, and subtle malrotation, I recommend operative intervention.      - General risks of surgery reviewed including risks of pain, bleeding, infection, scarring, damage to surrounding structures, need for further procedures, blood transfusion, VTE, risks of anesthesia, failure to improve symptoms, and recurrence of disease.    -  Patient will be placed on ERAS pathway. Written and verbal pre-op instructions given, including information regarding pre-op diet, utilization of chlorhexidine, and expectations for post-op activity.  Discussed expected course of recovery and post-op activity restrictions.  - Discussed discharge pathway.  - Discussed postoperative pain management and expectation that patient may require narcotic medication as an adjunct to multi-modal analgesic therapy.  - All questions were answered and patient desires to proceed.   - Plan to sign consents on the day of surgery.   - Preoperative testing needed: UPT on day of surgery  - See below for specific surgical planning details:      Surgical Planning:  Procedure: Right small finger P1 base CRPP  PMH considerations: DM2  PSH considerations: History of gastric bypass  Drug Allergies: NKDA  Anesthesia issues:  no FHx or personal problems with anesthesia  Planned positioning: Supine with hand table  Bleeding Issues:  no personal of FHx of bleeding or clotting problems   Pre-op consultations needed: None  Additional pre-op imaging needed: None  Counseled for smoking cessation to improve OR outcome: N/A  Special equipment needed in the operating room: Mini C arm  Post-op pain regimen:  icing, elevation, Tylenol, celebrex, gabapentin, oxyodone  Informed Consent: to be signed on day of surgery    I have personally seen Gisell Zepeda and discussed in detail their plan of care. Prior to departure, they indicated agreement with and understanding of their plan of care and their follow-up as documented herein this note. Please note that this note was written in combination with voice recognition/dictation software and there is a possibility of transcription errors which were not identified at the time of note submission. If clarification is necessary, please contact the author or clinic staff.    Luis Mayfield MD  Orthopaedic Surgery  9/23/2024      Rationale for 57 Modifier Addendum    Decision for Major Surgery  The decision for a major surgery was made during this visit/consultation based on review and discussion of the history of presentation, examination, available  labs/imaging, and the patient's functional status. The medical and surgical history were considered with regards to risk and potential modifications needed.

## 2024-09-24 PROBLEM — E44.1 MILD PROTEIN-CALORIE MALNUTRITION (HCC): Status: RESOLVED | Noted: 2024-06-03 | Resolved: 2024-09-24

## 2024-09-24 PROBLEM — G43.109 MIGRAINE WITH AURA, NOT INTRACTABLE, WITHOUT STATUS MIGRAINOSUS: Status: ACTIVE | Noted: 2018-10-03

## 2024-09-24 PROBLEM — E11.3399 MODERATE NONPROLIFERATIVE DIABETIC RETINOPATHY (HCC): Status: ACTIVE | Noted: 2021-02-06

## 2024-09-24 PROBLEM — H35.369 MACULAR DRUSEN: Status: ACTIVE | Noted: 2023-11-21

## 2024-09-24 PROBLEM — Z12.31 ENCOUNTER FOR SCREENING MAMMOGRAM FOR BREAST CANCER: Status: ACTIVE | Noted: 2024-09-24

## 2024-09-24 NOTE — DISCHARGE INSTRUCTIONS
Hand Fracture Pinning      WEIGHT BEARING: You will be immobilized in a cast for the first 4-6 weeks after surgery.  You may use the hand to assist with hygiene and minor tasks as restricted by the cast but should limit your weightbearing to less than a coffee cup.  This will be progressed slowly after your first postoperative visit.    RANGE OF MOTION: Range of Motion of Fingers and Elbow as Tolerated. You may move your fingers as tolerated if not restricted by your cast. Motion is important to avoid post-operative stiffness.  Once your cast is removed, you should then begin to progress your wrist motion as tolerated.    PAIN MEDICATIONS:   For many people, post-operative pain can be managed with simple measures, such as elevation of the operative extremity above the level of the heart, cryotherapy and ice, compression, etc. DO NOT UNDERESTIMATE THE IMPORTANCE OF ELEVATION. When your operative site is in a dependent position (below the heart), you will notice significantly more swelling/throbbing/pain.   In addition to these measures, we have prescribed pain medications. To minimize narcotic use and establish better pain control, we employ a multimodal pain approach. This protocol combines the use of scheduled acetaminophen, gabapentin, and narcotics.  We will often use anti-inflammatories (NSAID's such as ibuprofen, celecoxib, and/or naproxen) to further assist with pain control thus allowing for a lower dose of narcotic to be used. Dosing of medications will vary from patient to patient based on their needs and past medical history, so please refer to your discharge medication list.  Opiate pain medications (oxycodone) will only be refilled after an in-person visit. For all other medication refills, please contact us using the contact information below    You have been prescribed:    Acetaminophen (Tylenol) 500mg - Take 2 tabs by mouth every 8 hours for pain. Do not take more than 4000mg each day. Tylenol should  be taken as a first-line, scheduled pain medication. If you have liver problems, please discontinue and notify your surgeon.    Celecoxib (Celebrex) 100mg or 200mg (dosage will be determined by your age and/or kidney function) - Take 1 tablet by mouth every 12 hours for pain. NSAIDs (Ibuprofen, Naproxen, Celecoxib) should be taken as a first-line pain medication like acetaminophen. NSAIDs are non-steroidal anti-inflammatory medications. They reduce inflammation in the affected area. Please take NSAIDs as prescribed, with food, to avoid stomach ulcers. If you have a history of stomach ulcers or GI bleeding, please discontinue and notify your surgeon.    Gabapentin 300mg - Take 1 tab by mouth three times daily for the first ten days after surgery. This medication helps control nerve sensitivity after surgery    Oxycodone IR 5mg - Take one tablet by mouth as often as every 4 hours as needed for breakthrough pain. Take this medication as your breakthrough pain medication, after maximizing other pain medications. Opiate pain medications (Oxycodone, Hydrocodone, Oxycontin) are prescribed as a second-line pain medication for moderate to severe post-operative pain. Opiates are associated with dependency and addiction if taken for a prolonged period. For this reason, it is best to use opiates ONLY as needed.    Ondansetron (Zofran) 4mg - Dissolve 1 tab under your tongue every 8 hours as needed for nausea.    Senna-Docusate 8.6mg-50mg - Take 2 tabs by mouth every evening for constipation prevention. You should be having a bowel movement every 2-3 days.  If not, then proceed with over-the-counter laxatives (Docusate/Miralax), enemas, or suppositories to fix the constipation.  All of these are over the counter and can be discussed in more detail with your pharmacist. If you are having multiple bowel movements daily, you should discontinue this medication.    Vitamin C 500mg - Take 1 tablet by mouth twice daily for 6 weeks after  surgery. This may help with nerve irritation     Example Medication Schedule  Medication Morning   0600 Mid-Morning   1000 Early Afternoon   1400 Late Afternoon   1800 Evening   2200   Acetaminophen x  x  x   Celecoxib x    x   Gabapentin x  x  x   Oxycodone If needed If needed If needed If needed If needed   Zofran  If needed  If needed    Senna-Docusate     x   Vitamin C x    x   Ice x x x x x   *Adjust as needed    ICE PACK/CRYOTHERAPY: Use frequently over the next 7-10 days.  Ice bags/packs/bladder should be used for 20-30 minutes every 3-4 hours during waking hours. Protect your skin from frostbite with a washcloth, towel, or ace wrap between the ice bag/pack and your skin.    DRESSINGS/WOUND CARE:   Do not remove any splint or cast. This will be addressed at your follow-up appointment. If you feel that your cast/splint is too tight or there maybe another issue with it, please call the office to be seen or come to the emergency room.   Please keep your dressings/splint clean and dry. Follow the bathing instructions below.   If you have increased drainage, incision redness, foul smell, or fevers - inform the office.  You may need to be evaluated in the office earlier than your follow-up appointment.    PHYSICAL/OCCUPATIONAL THERAPY (PT/OT): To maximize surgical benefit, PT/OT is necessary starting immediately after the cast is removed. If you do not have an appointment, you should contact PT/OT to set up an appointment. If you have problems setting up therapy, please contact our nursing team.    BATHING:   You should keep your cast dry (keep out of shower or bath or keep covered with water tight bag with securing bands). If it becomes saturated, you should return to the hospital within the next day to have it replaced.  If your pins are exposed, you can gently wipe them with soapy water, otherwise there is no need to scrub around the pin site.  Do not soak the wound/incision, use hot tubs or swimming pools,  oceans, lakes, ponds until the pins and cast are removed   Following these guidelines will help avoid any wound healing issues and/or infections.    DVT PREVENTION:   Deep vein thrombosis (DVT) or blood clots sometimes occur following surgery. It is important to understand the signs/symptoms and methods to avoid DVTs.   Symptoms of DVT include swelling, throbbing and cramping in the extremity, swollen veins that are hard or sore. DVTs can travel to the lungs and cause a pulmonary embolus (PE) and death. Symptoms of a pulmonary embolus include chest pain and shortness of breath. If you experience any of these symptoms you should contact the clinic immediately and/or go to the nearest emergency room.   To prevent blood clots, you should move your extremities and joints, limited by the restrictions above. Perform foot pumps, ankle circles, leg lifts, etc. to circulate blood in the extremity.   If you have been prescribed Aspirin, please take it as prescribed for DVT prophylaxis. If you plan to travel in a car or plane for long periods (> 1 hour), contact your surgeon regarding the need for DVT prophylaxis. If you have a history of blood clots, and have not been prescribed a medication, contact your surgeon immediately.     DRIVING: Driving after your surgery is dependent on several factors. You may not drive if you are taking opiate pain medications. You may not drive if you're physically unable to steer with 2 hands and press the brake with full force. If you are unsure whether you are safe to drive, you should visit your local DMV. We are not medically or legally responsible for an accident caused by unsafe driving.     POST-OPERATIVE APPOINTMENT: Your first post-operative appointment is scheduled for around 14 days after the procedure. If you do not have an appointment scheduled yet, please contact our scheduling office.    SPECIAL INSTRUCTIONS: If you experience fevers greater than 100.4°F on two consecutive  measurements or any fever over 101°F, chest pain, difficulty breathing, confusion, or an allergic reaction, return to your nearest emergency department as soon as possible.     CONTACT INFORMATION: For any questions or concerns, please contact our nursing staff. You may also contact your surgeon via [employer].      Luis Mayfield MD  Department of Orthopaedics      HOME INSTRUCTIONS  [facility] HOME CARE INSTRUCTIONS: POST-OP ANESTHESIA  The medication that you received for sedation or general anesthesia can last up to 24 hours. Your judgment and reflexes may be altered, even if you feel like your normal self.      We Recommend:   Do not drive any motor vehicle or bicycle   Avoid mowing the lawn, playing sports, or working with power tools/applicances (power saws, electric knives or mixers)   That you have someone stay with you on your first night home   Do not drink alcohol or take sleeping pills or tranquilizers   Do not sign legal documents within 24 hours of your procedure   If you had a nerve block for your surgery, take extra care not to put any pressure on your arm or hand for 24 hours    It is normal:  For you to have a sore throat if you had a breathing tube during surgery (while you were asleep!). The sore throat should get better within 48 hours. You can gargle with warm salt water (1/2 tsp in 4 oz warm water) or use a throat lozenge for comfort  To feel muscle aches or soreness especially in the abdomen, chest or neck. The achy feeling should go away in the next 24 hours  To feel weak, sleepy or \"wiped out\". Your should start feeling better in the next 24 hours.   To experience mild discomforts such as sore lip or tongue, headache, cramps, gas pains or a bloated feeling in your abdomen.   To experience mild back pain or soreness for a day or two if you had spinal or epidural anesthesia.   If you had laparoscopic surgery, to feel shoulder pain or discomfort on the day of surgery.   For some patients to have  nausea after surgery/anesthesia    If you feel nausea or experience vomiting:   Try to move around less.   Eat less than usual or drink only liquids until the next morning   Nausea should resolve in about 24 hours    If you have a problem when you are at home:    Call your surgeons office   Discharge Instructions: After Your Surgery  You’ve just had surgery. During surgery, you were given medicine called anesthesia to keep you relaxed and free of pain. After surgery, you may have some pain or nausea. This is common. Here are some tips for feeling better and getting well after surgery.   Going home  Your healthcare provider will show you how to take care of yourself when you go home. They'll also answer your questions. Have an adult family member or friend drive you home. For the first 24 hours after your surgery:   Don't drive or use heavy equipment.  Don't make important decisions or sign legal papers.  Take medicines as directed.  Don't drink alcohol.  Have someone stay with you, if needed. They can watch for problems and help keep you safe.  Be sure to go to all follow-up visits with your healthcare provider. And rest after your surgery for as long as your provider tells you to.   Coping with pain  If you have pain after surgery, pain medicine will help you feel better. Take it as directed, before pain becomes severe. Also, ask your healthcare provider or pharmacist about other ways to control pain. This might be with heat, ice, or relaxation. And follow any other instructions your surgeon or nurse gives you.      Stay on schedule with your medicine.     Tips for taking pain medicine  To get the best relief possible, remember these points:   Pain medicines can upset your stomach. Taking them with a little food may help.  Most pain relievers taken by mouth need at least 20 to 30 minutes to start to work.  Don't wait till your pain becomes severe before you take your medicine. Try to time your medicine so that you  can take it before starting an activity. This might be before you get dressed, go for a walk, or sit down for dinner.  Constipation is a common side effect of some pain medicines. Call your healthcare provider before taking any medicines such as laxatives or stool softeners to help ease constipation. Also ask if you should skip any foods. Drinking lots of fluids and eating foods such as fruits and vegetables that are high in fiber can also help. Remember, don't take laxatives unless your surgeon has prescribed them.  Drinking alcohol and taking pain medicine can cause dizziness and slow your breathing. It can even be deadly. Don't drink alcohol while taking pain medicine.  Pain medicine can make you react more slowly to things. Don't drive or run machinery while taking pain medicine.  Your healthcare provider may tell you to take acetaminophen to help ease your pain. Ask them how much you're supposed to take each day. Acetaminophen or other pain relievers may interact with your prescription medicines or other over-the-counter (OTC) medicines. Some prescription medicines have acetaminophen and other ingredients in them. Using both prescription and OTC acetaminophen for pain can cause you to accidentally overdose. Read the labels on your OTC medicines with care. This will help you to clearly know the list of ingredients, how much to take, and any warnings. It may also help you not take too much acetaminophen. If you have questions or don't understand the information, ask your pharmacist or healthcare provider to explain it to you before you take the OTC medicine.   Managing nausea  Some people have an upset stomach (nausea) after surgery. This is often because of anesthesia, pain, or pain medicine, less movement of food in the stomach, or the stress of surgery. These tips will help you handle nausea and eat healthy foods as you get better. If you were on a special food plan before surgery, ask your healthcare provider  if you should follow it while you get better. Check with your provider on how your eating should progress. It may depend on the surgery you had. These general tips may help:   Don't push yourself to eat. Your body will tell you when to eat and how much.  Start off with clear liquids and soup. They're easier to digest.  Next try semi-solid foods as you feel ready. These include mashed potatoes, applesauce, and gelatin.  Slowly move to solid foods. Don’t eat fatty, rich, or spicy foods at first.  Don't force yourself to have 3 large meals a day. Instead eat smaller amounts more often.  Take pain medicines with a small amount of solid food, such as crackers or toast. This helps prevent nausea.  When to call your healthcare provider  Call your healthcare provider right away if you have any of these:   You still have too much pain, or the pain gets worse, after taking the medicine. The medicine may not be strong enough. Or there may be a complication from the surgery.  You feel too sleepy, dizzy, or groggy. The medicine may be too strong.  Side effects such as nausea or vomiting. Your healthcare provider may advise taking other medicines to .  Skin changes such as rash, itching, or hives. This may mean you have an allergic reaction. Your provider may advise taking other medicines.  The incision looks different (for instance, part of it opens up).  Bleeding or fluid leaking from the incision site, and weren't told to expect that.  Fever of 100.4°F (38°C) or higher, or as directed by your provider.  Call 911  Call 911 right away if you have:   Trouble breathing  Facial swelling    If you have obstructive sleep apnea   You were given anesthesia medicine during surgery to keep you comfortable and free of pain. After surgery, you may have more apnea spells because of this medicine and other medicines you were given. The spells may last longer than normal.    At home:  Keep using the continuous positive airway pressure (CPAP)  device when you sleep. Unless your healthcare provider tells you not to, use it when you sleep, day or night. CPAP is a common device used to treat obstructive sleep apnea.  Talk with your provider before taking any pain medicine, muscle relaxants, or sedatives. Your provider will tell you about the possible dangers of taking these medicines.  Contact your provider if your sleeping changes a lot even when taking medicines as directed.  [employer] last reviewed this educational content on 10/1/2021  © 9160-9263 [employer]. All rights reserved. This information is not intended as a substitute for professional medical care. Always follow your healthcare professional's instructions.

## 2024-09-25 ENCOUNTER — ANESTHESIA EVENT (OUTPATIENT)
Dept: SURGERY | Facility: HOSPITAL | Age: 39
End: 2024-09-25
Payer: COMMERCIAL

## 2024-09-25 ENCOUNTER — ANESTHESIA (OUTPATIENT)
Dept: SURGERY | Facility: HOSPITAL | Age: 39
End: 2024-09-25
Payer: COMMERCIAL

## 2024-09-25 ENCOUNTER — APPOINTMENT (OUTPATIENT)
Dept: GENERAL RADIOLOGY | Facility: HOSPITAL | Age: 39
End: 2024-09-25
Attending: STUDENT IN AN ORGANIZED HEALTH CARE EDUCATION/TRAINING PROGRAM
Payer: COMMERCIAL

## 2024-09-25 ENCOUNTER — HOSPITAL ENCOUNTER (OUTPATIENT)
Facility: HOSPITAL | Age: 39
Setting detail: HOSPITAL OUTPATIENT SURGERY
Discharge: HOME | End: 2024-09-25
Attending: STUDENT IN AN ORGANIZED HEALTH CARE EDUCATION/TRAINING PROGRAM | Admitting: STUDENT IN AN ORGANIZED HEALTH CARE EDUCATION/TRAINING PROGRAM
Payer: COMMERCIAL

## 2024-09-25 ENCOUNTER — PATIENT MESSAGE (OUTPATIENT)
Dept: ORTHOPEDICS CLINIC | Facility: CLINIC | Age: 39
End: 2024-09-25

## 2024-09-25 ENCOUNTER — OFFICE VISIT (OUTPATIENT)
Dept: INTERNAL MEDICINE CLINIC | Facility: CLINIC | Age: 39
End: 2024-09-25

## 2024-09-25 ENCOUNTER — MED REC SCAN ONLY (OUTPATIENT)
Dept: INTERNAL MEDICINE CLINIC | Facility: CLINIC | Age: 39
End: 2024-09-25

## 2024-09-25 VITALS
BODY MASS INDEX: 29.95 KG/M2 | WEIGHT: 138.81 LBS | DIASTOLIC BLOOD PRESSURE: 71 MMHG | OXYGEN SATURATION: 100 % | TEMPERATURE: 98 F | HEIGHT: 57 IN | SYSTOLIC BLOOD PRESSURE: 116 MMHG | HEART RATE: 82 BPM

## 2024-09-25 VITALS
HEART RATE: 64 BPM | SYSTOLIC BLOOD PRESSURE: 123 MMHG | WEIGHT: 138.31 LBS | RESPIRATION RATE: 14 BRPM | HEIGHT: 57 IN | OXYGEN SATURATION: 97 % | BODY MASS INDEX: 29.84 KG/M2 | DIASTOLIC BLOOD PRESSURE: 73 MMHG | TEMPERATURE: 98 F

## 2024-09-25 DIAGNOSIS — S62.616A CLOSED DISPLACED FRACTURE OF PROXIMAL PHALANX OF RIGHT LITTLE FINGER, INITIAL ENCOUNTER: Primary | ICD-10-CM

## 2024-09-25 DIAGNOSIS — Z12.31 ENCOUNTER FOR SCREENING MAMMOGRAM FOR BREAST CANCER: ICD-10-CM

## 2024-09-25 DIAGNOSIS — E11.649 TYPE 2 DIABETES MELLITUS WITH HYPOGLYCEMIA WITHOUT COMA, WITHOUT LONG-TERM CURRENT USE OF INSULIN (HCC): ICD-10-CM

## 2024-09-25 DIAGNOSIS — E55.9 VITAMIN D DEFICIENCY: ICD-10-CM

## 2024-09-25 DIAGNOSIS — I10 ESSENTIAL HYPERTENSION: Primary | ICD-10-CM

## 2024-09-25 DIAGNOSIS — R82.81 PYURIA: ICD-10-CM

## 2024-09-25 DIAGNOSIS — E78.2 HYPERCHOLESTEROLEMIA WITH HYPERTRIGLYCERIDEMIA: ICD-10-CM

## 2024-09-25 DIAGNOSIS — S62.616A CLOSED DISPLACED FRACTURE OF PROXIMAL PHALANX OF RIGHT LITTLE FINGER, INITIAL ENCOUNTER: ICD-10-CM

## 2024-09-25 DIAGNOSIS — Z71.85 VACCINE COUNSELING: ICD-10-CM

## 2024-09-25 LAB
ALBUMIN SERPL-MCNC: 4.3 G/DL (ref 3.2–4.8)
ALBUMIN/GLOB SERPL: 1.5 {RATIO} (ref 1–2)
ALP LIVER SERPL-CCNC: 57 U/L
ALT SERPL-CCNC: 9 U/L
ANION GAP SERPL CALC-SCNC: 7 MMOL/L (ref 0–18)
APPEARANCE: CLEAR
AST SERPL-CCNC: 16 U/L (ref ?–34)
ATRIAL RATE: 71 BPM
B-HCG UR QL: NEGATIVE
BILIRUB SERPL-MCNC: 0.3 MG/DL (ref 0.3–1.2)
BILIRUBIN: NEGATIVE
BUN BLD-MCNC: 15 MG/DL (ref 9–23)
BUN/CREAT SERPL: 22.7 (ref 10–20)
CALCIUM BLD-MCNC: 8.9 MG/DL (ref 8.7–10.4)
CHLORIDE SERPL-SCNC: 106 MMOL/L (ref 98–112)
CHOLEST SERPL-MCNC: 175 MG/DL (ref ?–200)
CO2 SERPL-SCNC: 28 MMOL/L (ref 21–32)
CREAT BLD-MCNC: 0.66 MG/DL
EGFRCR SERPLBLD CKD-EPI 2021: 114 ML/MIN/1.73M2 (ref 60–?)
EST. AVERAGE GLUCOSE BLD GHB EST-MCNC: 171 MG/DL (ref 68–126)
FASTING PATIENT LIPID ANSWER: YES
FASTING STATUS PATIENT QL REPORTED: YES
GLOBULIN PLAS-MCNC: 2.8 G/DL (ref 2–3.5)
GLUCOSE (URINE DIPSTICK): NEGATIVE MG/DL
GLUCOSE BLD-MCNC: 95 MG/DL (ref 70–99)
GLUCOSE BLDC GLUCOMTR-MCNC: 90 MG/DL (ref 70–99)
GLUCOSE BLDC GLUCOMTR-MCNC: 98 MG/DL (ref 70–99)
HBA1C MFR BLD: 7.6 % (ref ?–5.7)
HDLC SERPL-MCNC: 64 MG/DL (ref 40–59)
KETONES (URINE DIPSTICK): NEGATIVE MG/DL
LDLC SERPL CALC-MCNC: 93 MG/DL (ref ?–100)
MULTISTIX LOT#: ABNORMAL NUMERIC
NITRITE, URINE: NEGATIVE
NONHDLC SERPL-MCNC: 111 MG/DL (ref ?–130)
OCCULT BLOOD: NEGATIVE
OSMOLALITY SERPL CALC.SUM OF ELEC: 293 MOSM/KG (ref 275–295)
P AXIS: 3 DEGREES
P-R INTERVAL: 172 MS
PH, URINE: 6.5 (ref 4.5–8)
POTASSIUM SERPL-SCNC: 3.8 MMOL/L (ref 3.5–5.1)
PROT SERPL-MCNC: 7.1 G/DL (ref 5.7–8.2)
PROTEIN (URINE DIPSTICK): NEGATIVE MG/DL
Q-T INTERVAL: 376 MS
QRS DURATION: 76 MS
QTC CALCULATION (BEZET): 408 MS
R AXIS: 21 DEGREES
SODIUM SERPL-SCNC: 141 MMOL/L (ref 136–145)
SPECIFIC GRAVITY: 1.02 (ref 1–1.03)
T AXIS: 8 DEGREES
TRIGL SERPL-MCNC: 98 MG/DL (ref 30–149)
URINE-COLOR: YELLOW
UROBILINOGEN,SEMI-QN: 1 MG/DL (ref 0–1.9)
VENTRICULAR RATE: 71 BPM
VIT D+METAB SERPL-MCNC: 29.4 NG/ML (ref 30–100)
VLDLC SERPL CALC-MCNC: 16 MG/DL (ref 0–30)

## 2024-09-25 PROCEDURE — 3008F BODY MASS INDEX DOCD: CPT | Performed by: INTERNAL MEDICINE

## 2024-09-25 PROCEDURE — 81003 URINALYSIS AUTO W/O SCOPE: CPT | Performed by: INTERNAL MEDICINE

## 2024-09-25 PROCEDURE — 3074F SYST BP LT 130 MM HG: CPT | Performed by: INTERNAL MEDICINE

## 2024-09-25 PROCEDURE — 76000 FLUOROSCOPY <1 HR PHYS/QHP: CPT | Performed by: STUDENT IN AN ORGANIZED HEALTH CARE EDUCATION/TRAINING PROGRAM

## 2024-09-25 PROCEDURE — 99215 OFFICE O/P EST HI 40 MIN: CPT | Performed by: INTERNAL MEDICINE

## 2024-09-25 PROCEDURE — 3078F DIAST BP <80 MM HG: CPT | Performed by: INTERNAL MEDICINE

## 2024-09-25 PROCEDURE — 3074F SYST BP LT 130 MM HG: CPT | Performed by: STUDENT IN AN ORGANIZED HEALTH CARE EDUCATION/TRAINING PROGRAM

## 2024-09-25 PROCEDURE — 3078F DIAST BP <80 MM HG: CPT | Performed by: STUDENT IN AN ORGANIZED HEALTH CARE EDUCATION/TRAINING PROGRAM

## 2024-09-25 PROCEDURE — 93000 ELECTROCARDIOGRAM COMPLETE: CPT | Performed by: INTERNAL MEDICINE

## 2024-09-25 PROCEDURE — 3008F BODY MASS INDEX DOCD: CPT | Performed by: STUDENT IN AN ORGANIZED HEALTH CARE EDUCATION/TRAINING PROGRAM

## 2024-09-25 PROCEDURE — 0PST04Z REPOSITION RIGHT FINGER PHALANX WITH INTERNAL FIXATION DEVICE, OPEN APPROACH: ICD-10-PCS | Performed by: STUDENT IN AN ORGANIZED HEALTH CARE EDUCATION/TRAINING PROGRAM

## 2024-09-25 DEVICE — K WIRE 0.045X4IN SMTH PLN 3 SIDE DBL TRCR PT: Type: IMPLANTABLE DEVICE | Status: FUNCTIONAL

## 2024-09-25 RX ORDER — SODIUM CHLORIDE, SODIUM LACTATE, POTASSIUM CHLORIDE, CALCIUM CHLORIDE 600; 310; 30; 20 MG/100ML; MG/100ML; MG/100ML; MG/100ML
INJECTION, SOLUTION INTRAVENOUS CONTINUOUS
Status: DISCONTINUED | OUTPATIENT
Start: 2024-09-25 | End: 2024-09-25

## 2024-09-25 RX ORDER — LIDOCAINE HYDROCHLORIDE 10 MG/ML
INJECTION, SOLUTION EPIDURAL; INFILTRATION; INTRACAUDAL; PERINEURAL AS NEEDED
Status: DISCONTINUED | OUTPATIENT
Start: 2024-09-25 | End: 2024-09-25 | Stop reason: SURG

## 2024-09-25 RX ORDER — ASCORBIC ACID 500 MG
500 TABLET ORAL 2 TIMES DAILY
Qty: 84 TABLET | Refills: 0 | Status: SHIPPED | OUTPATIENT
Start: 2024-09-25

## 2024-09-25 RX ORDER — NICOTINE POLACRILEX 4 MG
30 LOZENGE BUCCAL
Status: DISCONTINUED | OUTPATIENT
Start: 2024-09-25 | End: 2024-09-25

## 2024-09-25 RX ORDER — DEXAMETHASONE SODIUM PHOSPHATE 4 MG/ML
VIAL (ML) INJECTION AS NEEDED
Status: DISCONTINUED | OUTPATIENT
Start: 2024-09-25 | End: 2024-09-25 | Stop reason: SURG

## 2024-09-25 RX ORDER — MORPHINE SULFATE 10 MG/ML
6 INJECTION, SOLUTION INTRAMUSCULAR; INTRAVENOUS EVERY 10 MIN PRN
Status: DISCONTINUED | OUTPATIENT
Start: 2024-09-25 | End: 2024-09-25

## 2024-09-25 RX ORDER — NAPROXEN 500 MG/1
500 TABLET ORAL 2 TIMES DAILY WITH MEALS
Status: DISCONTINUED | OUTPATIENT
Start: 2024-09-26 | End: 2024-09-25

## 2024-09-25 RX ORDER — KETOROLAC TROMETHAMINE 15 MG/ML
15 INJECTION, SOLUTION INTRAMUSCULAR; INTRAVENOUS EVERY 6 HOURS
Status: DISCONTINUED | OUTPATIENT
Start: 2024-09-25 | End: 2024-09-25

## 2024-09-25 RX ORDER — HYDROMORPHONE HYDROCHLORIDE 1 MG/ML
0.4 INJECTION, SOLUTION INTRAMUSCULAR; INTRAVENOUS; SUBCUTANEOUS EVERY 5 MIN PRN
Status: DISCONTINUED | OUTPATIENT
Start: 2024-09-25 | End: 2024-09-25

## 2024-09-25 RX ORDER — MIDAZOLAM HYDROCHLORIDE 1 MG/ML
INJECTION INTRAMUSCULAR; INTRAVENOUS AS NEEDED
Status: DISCONTINUED | OUTPATIENT
Start: 2024-09-25 | End: 2024-09-25 | Stop reason: SURG

## 2024-09-25 RX ORDER — DEXTROSE MONOHYDRATE 25 G/50ML
50 INJECTION, SOLUTION INTRAVENOUS
Status: DISCONTINUED | OUTPATIENT
Start: 2024-09-25 | End: 2024-09-25

## 2024-09-25 RX ORDER — HYDROMORPHONE HYDROCHLORIDE 1 MG/ML
0.6 INJECTION, SOLUTION INTRAMUSCULAR; INTRAVENOUS; SUBCUTANEOUS EVERY 5 MIN PRN
Status: DISCONTINUED | OUTPATIENT
Start: 2024-09-25 | End: 2024-09-25

## 2024-09-25 RX ORDER — SENNA AND DOCUSATE SODIUM 50; 8.6 MG/1; MG/1
2 TABLET, FILM COATED ORAL NIGHTLY
Qty: 28 TABLET | Refills: 0 | Status: SHIPPED | OUTPATIENT
Start: 2024-09-25

## 2024-09-25 RX ORDER — ACETAMINOPHEN 500 MG
1000 TABLET ORAL EVERY 8 HOURS SCHEDULED
Status: DISCONTINUED | OUTPATIENT
Start: 2024-09-25 | End: 2024-09-25

## 2024-09-25 RX ORDER — OXYCODONE HYDROCHLORIDE 5 MG/1
5 TABLET ORAL EVERY 4 HOURS PRN
Qty: 12 TABLET | Refills: 0 | Status: SHIPPED | OUTPATIENT
Start: 2024-09-25

## 2024-09-25 RX ORDER — NALOXONE HYDROCHLORIDE 0.4 MG/ML
0.08 INJECTION, SOLUTION INTRAMUSCULAR; INTRAVENOUS; SUBCUTANEOUS AS NEEDED
Status: DISCONTINUED | OUTPATIENT
Start: 2024-09-25 | End: 2024-09-25

## 2024-09-25 RX ORDER — MORPHINE SULFATE 4 MG/ML
4 INJECTION, SOLUTION INTRAMUSCULAR; INTRAVENOUS EVERY 10 MIN PRN
Status: DISCONTINUED | OUTPATIENT
Start: 2024-09-25 | End: 2024-09-25

## 2024-09-25 RX ORDER — ACETAMINOPHEN 500 MG
1000 TABLET ORAL ONCE
Status: COMPLETED | OUTPATIENT
Start: 2024-09-25 | End: 2024-09-25

## 2024-09-25 RX ORDER — NICOTINE POLACRILEX 4 MG
15 LOZENGE BUCCAL
Status: DISCONTINUED | OUTPATIENT
Start: 2024-09-25 | End: 2024-09-25

## 2024-09-25 RX ORDER — CELECOXIB 100 MG/1
100 CAPSULE ORAL 2 TIMES DAILY
Qty: 60 CAPSULE | Refills: 0 | Status: SHIPPED | OUTPATIENT
Start: 2024-09-25 | End: 2024-10-25

## 2024-09-25 RX ORDER — BUPIVACAINE HYDROCHLORIDE 5 MG/ML
INJECTION, SOLUTION EPIDURAL; INTRACAUDAL AS NEEDED
Status: DISCONTINUED | OUTPATIENT
Start: 2024-09-25 | End: 2024-09-25 | Stop reason: HOSPADM

## 2024-09-25 RX ORDER — HYDROMORPHONE HYDROCHLORIDE 1 MG/ML
0.2 INJECTION, SOLUTION INTRAMUSCULAR; INTRAVENOUS; SUBCUTANEOUS EVERY 5 MIN PRN
Status: DISCONTINUED | OUTPATIENT
Start: 2024-09-25 | End: 2024-09-25

## 2024-09-25 RX ORDER — ONDANSETRON 2 MG/ML
INJECTION INTRAMUSCULAR; INTRAVENOUS AS NEEDED
Status: DISCONTINUED | OUTPATIENT
Start: 2024-09-25 | End: 2024-09-25 | Stop reason: SURG

## 2024-09-25 RX ORDER — MORPHINE SULFATE 4 MG/ML
2 INJECTION, SOLUTION INTRAMUSCULAR; INTRAVENOUS EVERY 10 MIN PRN
Status: DISCONTINUED | OUTPATIENT
Start: 2024-09-25 | End: 2024-09-25

## 2024-09-25 RX ORDER — GABAPENTIN 300 MG/1
300 CAPSULE ORAL EVERY 8 HOURS
Qty: 30 CAPSULE | Refills: 0 | Status: SHIPPED | OUTPATIENT
Start: 2024-09-25

## 2024-09-25 RX ORDER — ONDANSETRON 4 MG/1
4 TABLET, FILM COATED ORAL EVERY 8 HOURS PRN
Qty: 10 TABLET | Refills: 0 | Status: SHIPPED | OUTPATIENT
Start: 2024-09-25

## 2024-09-25 RX ORDER — ONDANSETRON 2 MG/ML
4 INJECTION INTRAMUSCULAR; INTRAVENOUS EVERY 6 HOURS PRN
Status: DISCONTINUED | OUTPATIENT
Start: 2024-09-25 | End: 2024-09-25

## 2024-09-25 RX ADMIN — LIDOCAINE HYDROCHLORIDE 50 MG: 10 INJECTION, SOLUTION EPIDURAL; INFILTRATION; INTRACAUDAL; PERINEURAL at 12:14:00

## 2024-09-25 RX ADMIN — ONDANSETRON 4 MG: 2 INJECTION INTRAMUSCULAR; INTRAVENOUS at 12:21:00

## 2024-09-25 RX ADMIN — SODIUM CHLORIDE, SODIUM LACTATE, POTASSIUM CHLORIDE, CALCIUM CHLORIDE: 600; 310; 30; 20 INJECTION, SOLUTION INTRAVENOUS at 12:50:00

## 2024-09-25 RX ADMIN — SODIUM CHLORIDE, SODIUM LACTATE, POTASSIUM CHLORIDE, CALCIUM CHLORIDE: 600; 310; 30; 20 INJECTION, SOLUTION INTRAVENOUS at 12:09:00

## 2024-09-25 RX ADMIN — DEXAMETHASONE SODIUM PHOSPHATE 4 MG: 4 MG/ML VIAL (ML) INJECTION at 12:22:00

## 2024-09-25 RX ADMIN — MIDAZOLAM HYDROCHLORIDE 2 MG: 1 INJECTION INTRAMUSCULAR; INTRAVENOUS at 12:14:00

## 2024-09-25 NOTE — ANESTHESIA PROCEDURE NOTES
Airway  Date/Time: 9/25/2024 12:16 PM  Urgency: Elective    Airway not difficult    General Information and Staff    Patient location during procedure: OR  Anesthesiologist: Desmond Nielsen MD  Performed: anesthesiologist   Performed by: Desmond Nielsen MD  Authorized by: Desmond Nielsen MD      Indications and Patient Condition  Indications for airway management: anesthesia  Spontaneous ventilation: present  Sedation level: deep  Preoxygenated: yes  Patient position: sniffing  Mask difficulty assessment: 1 - vent by mask    Final Airway Details  Final airway type: supraglottic airway      Successful airway: classic  Size 3       Number of attempts at approach: 1

## 2024-09-25 NOTE — OPERATIVE REPORT
PATIENT NAME: Gisell Zepeda   DATE OF OPERATION: 9/25/2024      PREOPERATIVE DIAGNOSIS: Right small finger proximal phalanx fracture, closed, displaced, intra-articular, 2 parts   POSTOPERATIVE DIAGNOSIS:  Right small finger proximal phalanx fracture, closed, displaced, intra-articular, 2 parts      PROCEDURE PERFORMED:    1.  Open reduction internal fixation Right small finger proximal phalanx fracture, intra-articular      STAFF SURGEON:  Luis Mayfield MD   FIRST ASSISTANT: Sid Horne CSA   ANESTHESIA:  General    ANTIBIOTICS:   Ancef 2 grams.     IMPLANTS:  Implant Name Type Inv. Item Serial No.  Lot No. LRB No. Used Action   [employer] [id number] SMTH PLN 3 SIDE DBL TRCR PT - SN/A  K [employer]IRE [id number] SMTH PLN 3 SIDE DBL TRCR PT N/A [employer] WD N/A Right 1 Implanted        COMPLICATIONS:  None.   CONDITION:  Stable to post anesthesia care unit.      INDICATION FOR PROCEDURE: Gisell Zepeda   is a 39 year old female who presented with the above diagnosis after GLF.  The patient sustained an unstable fracture pattern with high risk for displacement and loss of function.  We discussed the risks and benefits of operative versus nonoperative management. The risks of nonoperative management specifically the risk of malunion, nonunion, loss of mobility/function, and persistent pain were discussed and the patient elected to undergo operative treatment.  We discussed benefits of the surgery including return of function and stability and for pain management. We discussed alternative options including nonoperative management and alternative forms of fixation. We additionally discussed the risks of surgery, which include, but are not limited to bleeding, pain, infection, damage to nerves/vessels/tendons, incomplete relief of pain, stiffness, incomplete return of function, nonunion, need for additional surgery, prominent hardware, blood clots, and death. The patient understands the above risks and elected  to proceed.        DESCRIPTION OF THE OPERATION:  On the day of the procedure, the patient was met in the preoperative holding area where the consent form was verified and the correct patient, laterality and procedure were identified and found to be correct.  The operative extremity was marked with indelible ink and the patient was then met by the anesthesia provider.  The patient was subsequently transferred to the operating room and placed in the supine position on the operating room table with a hand table attachment.  All bony prominences were well padded. Once adequate anesthesia was induced and prophylactic antibiotics were infused, the dressings to the operative upper extremity were removed and a tourniquet was placed high on the arm. The arm was then prepped and draped in the usual sterile fashion.      A surgical time-out was performed prior to the initiation of the surgery and the consent form was verified and the patient, laterality, and correct procedure were verified and found to be correct.      A reduction maneuver was then performed and the fracture was confirmed to be in appropriate alignment. Having achieved this, two 0.045 pins, were selected and advanced anterograde in a crossing fashion, through the fracture site, which was confirmed as appropriate under fluoroscopic imaging.      Final fluoroscopic imaging confirmed anatomic reduction of fracture with appropriate hardware positioning and appropriate pin lengths not violating the articular surface or compromising tendons/nerves. The wound was then copiously irrigated with normal saline. The pins were cut, bent, and capped.     The wounds were then cleaned and dressed with xeroform, 4x4's, and webril.  An appropriately molded intrinsic plus splint was applied and overwrapped with ACE bandages.  Once the splint had hardened, the patient was awoken from anesthesia without complication.  The patient was subsequently transferred to the  post-anesthesia care unit in good condition     POSTOPERATIVE INSTRUCTIONS:   - NWB  - Start PT/OT  - Discharge to home  - Pain protocol including elevation, icing, acetaminophen, NSAIDs, gabapentin, and oxycodone as needed  - Patient will follow up with me at two weeks where splint will be removed     First Assist Necessity and Involvement     For this procedure, a surgical assistant was present for, and assisted with, the entirety of the case. The surgical assistant was involved with patient positioning, prepping and draping, directly assisting with key portions of the case including retracting and/or managing instrumentation, and closing. The surgical assistant was necessary to ensure greater likelihood of a safe and efficient operation, and no Orthopaedic Surgery resident was available to operate as an assistant.

## 2024-09-25 NOTE — INTERVAL H&P NOTE
Pre-op Diagnosis: Closed displaced fracture of proximal phalanx of right little finger, initial encounter [V72.674G]    The above referenced H&P was reviewed by Luis Mayfield MD on 9/25/2024, the patient was examined and no significant changes have occurred in the patient's condition since the H&P was performed.  I discussed with the patient and/or legal representative the potential benefits, risks and side effects of this procedure; the likelihood of the patient achieving goals; and potential problems that might occur during recuperation.  I discussed reasonable alternatives to the procedure, including risks, benefits and side effects related to the alternatives and risks related to not receiving this procedure.  We will proceed with procedure as planned.

## 2024-09-25 NOTE — PROGRESS NOTES
HPI:    Patient ID: Gisell Zepeda is a 39 year old female.    Chief Complaint   Patient presents with    Follow - Up     Patient states she is here for a follow up.       Gisell Zepeda is a 39 year old female who presents for a pre-operative physical exam. Patient is to have R ORIF small finger, to be done by Dr. Mayfield at [facility] on today.    Pt has had previous anesthesia:  Yes.  Previous complications:  No  Chief Complaint   Patient presents with    Follow - Up     Patient states she is here for a follow up.        Current Outpatient Medications   Medication Sig Dispense Refill    Phentermine HCl 15 MG Oral Cap Take 1 capsule (15 mg total) by mouth every morning. 30 capsule 5    METFORMIN 500 MG Oral Tab TAKE 1 TABLET(500 MG) BY MOUTH EVERY 12 HOURS 180 tablet 1    Norgestim-Eth Estrad Triphasic (ORTHO TRI-CYCLEN LO) 0.18/0.215/0.25 MG-25 MCG Oral Tab Take 1 tablet by mouth daily. (Patient taking differently: Take 1 tablet by mouth every evening.) 84 tablet 3    methylPREDNISolone (MEDROL) 4 MG Oral Tablet Therapy Pack As directed. 1 each 0    ondansetron (ZOFRAN) 4 mg tablet Take 1 tablet (4 mg total) by mouth every 8 (eight) hours as needed for Nausea. 10 tablet 3    Glucose Blood (ONETOUCH ULTRA) In Vitro Strip CHECK BLOOD SUGAR TWICE DAILY 200 strip 3    fluconazole 200 MG Oral Tab Take 4 tablets (800 mg total) by mouth daily. 28 tablet 0    Lancets Does not apply Misc Check blood sugar twice daily. 50 each 2    Blood Glucose Monitoring Suppl (ONETOUCH ULTRA 2) w/Device Does not apply Kit Check blood sugar 2 times daily 1 kit 0    Continuous Blood Gluc  (DEXCOM G6 ) Does not apply Device 1 each by Does not apply route See Admin Instructions. 1 Device 0    Continuous Blood Gluc Sensor (DEXCOM G6 SENSOR) Does not apply Misc 1 each by Does not apply route See Admin Instructions. 9 each 3    Continuous Blood Gluc Transmit (DEXCOM G6 TRANSMITTER) Does not apply Misc 1 each by Does not apply route See Admin  Instructions. 3 each 3    Multiple Vitamin (MULTIVITAMINS OR) Take by mouth. Bariatric vitamins       semaglutide (OZEMPIC, 0.25 OR 0.5 MG/DOSE,) 2 MG/3ML Subcutaneous Solution Pen-injector Inject 0.25 mg into the skin once a week. 6 mL 3    pantoprazole 40 MG Oral Tab EC Take 1 tablet (40 mg total) by mouth As Directed. (Patient not taking: Reported on 11/29/2023)        Allergies: No Known Allergies   Past Medical History:    Abrasion of toe of left foot    Class 3 severe obesity due to excess calories with serious comorbidity and body mass index (BMI) of 40.0 to 44.9 in adult (HCC)    Diabetes (HCC)    Diabetes mellitus, type 2 (HCC)    Essential hypertension    High blood pressure    High cholesterol    High cholesterol    History of blood transfusion    no reactions    Hypercholesterolemia with hypertriglyceridemia    Intertrigo    Morbid (severe) obesity due to excess calories (HCC)    Morbid obesity with BMI of 45.0-49.9, adult (HCC)    Morbid obesity with BMI of 50.0-59.9, adult (HCC)    S/P gastric bypass    Uncontrolled type 2 diabetes mellitus with hyperglycemia (HCC)    Visual impairment    GLASSES      Past Surgical History:   Procedure Laterality Date    Hc surgery catheter eps dx/ablation other than 3d c1733      abdominoplasty, removal of excess skin on bilateral arms    Lap gastric bypass/nayan-en-y  09/14/2020    Dr Black, [facility]      Family History   Problem Relation Age of Onset    Hypertension Father     Diabetes Father     Diabetes Mother     Breast Cancer Mother     Other (EToHism. ) Maternal Grandmother     Diabetes Paternal Grandmother     Hypertension Paternal Grandmother     Lipids Paternal Grandmother     Renal Disease Paternal Grandmother     Diabetes Paternal Grandfather         Type 1, IDDM with retinoapthy.     Stroke Paternal Grandfather     Other (PVD) Paternal Grandfather     Other (retinopathy) Paternal Grandfather     Breast Cancer Paternal Aunt       Social History      Socioeconomic History    Marital status: Single   Occupational History    Occupation: HR.    Tobacco Use    Smoking status: Never     Passive exposure: Never    Smokeless tobacco: Never   Vaping Use    Vaping status: Never Used   Substance and Sexual Activity    Alcohol use: Yes     Comment: socially     Drug use: Never         EXAM:   /71 (BP Location: Left arm, Patient Position: Sitting, Cuff Size: adult)   Pulse 82   Temp 98.4 °F (36.9 °C) (Oral)   Ht 4' 9\" (1.448 m)   Wt 138 lb 12.8 oz (63 kg)   LMP  (LMP Unknown)   SpO2 100%   BMI 30.04 kg/m²  Body mass index is 30.04 kg/m².    Recent Labs:   Results for orders placed or performed in visit on 06/03/24   Vitamin B1 (Thiamine), Blood   Result Value Ref Range    Vitamin B1 Whole Bld 101.4 66.5 - 200.0 nmol/L   Hemoglobin A1C   Result Value Ref Range    HgbA1C 7.0 (H) <5.7 %    Estimated Average Glucose 154 (H) 68 - 126 mg/dL   Vitamin D, 25-Hydroxy   Result Value Ref Range    Vitamin D, 25OH, Total 29.8 (L) 30.0 - 100.0 ng/mL       ASSESSMENT AND PLAN:   Gisell Zepeda is a 39 year old female who presents for a pre-operative physical exam.     EKG shows: Normal sinus rhythm, normal axis, normal R wave progression no changes from prior EKG.    Per ACC/ AHA guidelines, this patient may proceed to surgery without further risk stratification.    2. Pulmonary risk    3. Heme  No history of bleeding disorder, no evidence of significant anemia  CBC ordered    4. Renal  No hx of renal disease  CMP ordered    Pt may proceed to surgery without recommendation for any further evaluation / risk stratification.  Medical conditions are optimized for surgery and perioperative medication recommendations are outlined above.     Assessment:  Encounter Diagnoses   Name Primary?    Essential hypertension Stable. Careful with diet and excercise at least 30 minutes 3-4 times a week. Check blood pressures at different times on different days. Can purchase own blood  pressure monitor. If not, check at local pharmacy. Bake foods more and grill occasionally. Avoid fried foods. No salt. Use other seasonings.     Yes    Hypercholesterolemia with hypertriglyceridemia Check blood.        Type 2 diabetes mellitus with hypoglycemia without coma, without long-term current use of insulin (HCC) Stable. Check blood. Careful with diet and excercise at least 30 minutes 3-4 times a week. Check sugars at different times on different dates. Careful with low sugars. Carry something with you and check sugar if can. Can carry dylan cracker, etc. Decrease carbohydrates. But also, careful with fruits and natural sugars.One serving a day and no more than 1 handful every day. Check feet  every AM and careful with sores and ulcers on feet bilaterally. Check eyes every year with dilated eye exam.  Check sugars.  2-hour postmeal should be less than 140s.  Pre-meal should be 's.  Both equally affected A1c.  Discussed importance of glycemic control to prevent complications of diabetes  -Discussed complications of diabetes include retinopathy, neuropathy, nephropathy and cardiovascular disease  -Discussed ABCs of DM  -Discussed importance of SBGM  -Discussed importance of low CHO diet, recommend 45gm per meal or 135gm per day  -Normal foot exam  -UTD with optho, FU after 11-8-24.   -Normotensive        Vaccine counseling recommend COVID booster and flu shot.  We do not have the COVID booster.  Can get at a local pharmacy.  Recommend flu shot.  Can do either a nurse only visit scheduled after September 30 woman should be getting the vaccine or can do at the local pharmacy.  Separate all vaccines by 2 weeks.       Vitamin D deficiency Check blood.          Encounter for screening mammogram for breast cancer Check mammogram. Continue self breast exam every month.         Closed displaced fracture of proximal phalanx of right little finger, initial encounter check blood and EKG.  Check UA.        Plan    Orders:  Orders Placed This Encounter   Procedures    Lipid Panel    Comp Metabolic Panel (14)    Vitamin D    URINALYSIS, AUTO, W/O SCOPE    CBC With Differential With Platelet    Vitamin D, 25-Hydroxy     Medications filled today:  Requested Prescriptions      No prescriptions requested or ordered in this encounter     Radiology orders:  ELECTROCARDIOGRAM, COMPLETE  [facility] PERNELL 2D+3D SCREENING BILAT (CPT=77067/92661)    Return in about 24 weeks (around 3/12/2025), or if symptoms worsen or fail to improve.     This consult was sent back the referring physician, Dr. Mayfield.         Patient here for follow up of Diabetes.  Has been taking medications regularly.  Dexcom.   Checks sugars 1 times daily.  Fasting sugars average 110's. No lows. Lowest at 70's in early AM, if eat big dinner.   Watches diabetic diet, low salt.  Diabetic Flow sheet      Latest Ref Rng & Units 9/25/2024     9:28 AM 9/23/2024     4:30 PM 6/3/2024    12:57 PM 6/3/2024    12:12 PM   DIABETIC FLOWSHEET (Formerly [facility])   BMI  30.04 kg/m2 28.35 kg/m2  29.16 kg/m2   Hgb A1c <5.7 %   7.0     HEMOGLOBIN A1c <5.7 %   7.0     Weight (enc vitals)  138 lb 12.8 oz 131 lb  135 lb 11.2 oz   BP (enc vitals)  116/71   122/66      HISTORY OF DIABETES COMPLICATIONS: :  History of Retinopathy: Yes.   History of Neuropathy: No.   History of Nephropathy: No.      ASSOCIATED COMPLICATIONS:   HTN: Yes.   Hyperlipidemia: Yes.   Coronary Artery Disease:  CAD,  HF, PAD  Cerebrovascular Disease: No.     MEALS:  3 meals a day, snacks in between.   Cooks at home    Hypertension  Patient is here for follow up of hypertension. BP at home: not check.   Has been compliant with medications.  Exercise level: exercises 4 times a  week (cardio. X 30-45 minutes) and has been following low salt diet.  Weight has been stable. Eats at home.   Wt Readings from Last 3 Encounters:   09/25/24 138 lb 12.8 oz (63 kg)   09/23/24 131 lb (59.4 kg)   06/03/24 135 lb 11.2 oz (61.6 kg)     BP Readings from  Last 3 Encounters:   09/25/24 116/71   06/03/24 122/66   03/12/24 135/83     Labs:   Lab Results   Component Value Date/Time     (H) 07/01/2023 07:32 AM     07/01/2023 07:32 AM    K 4.2 07/01/2023 07:32 AM     07/01/2023 07:32 AM    CO2 29.0 07/01/2023 07:32 AM    CREATSERUM 0.46 (L) 07/01/2023 07:32 AM    CA 8.7 07/01/2023 07:32 AM    AST 13 (L) 07/01/2023 07:32 AM    ALT 19 07/01/2023 07:32 AM    TSH 0.914 09/16/2022 10:55 AM        Lab Results   Component Value Date/Time    CHOLEST 186 07/01/2023 07:32 AM    HDL 68 (H) 07/01/2023 07:32 AM    TRIG 71 07/01/2023 07:32 AM     (H) 07/01/2023 07:32 AM    NONHDLC 118 07/01/2023 07:32 AM            Wt Readings from Last 3 Encounters:   09/25/24 138 lb 12.8 oz (63 kg)   09/23/24 131 lb (59.4 kg)   06/03/24 135 lb 11.2 oz (61.6 kg)     BP Readings from Last 3 Encounters:   09/25/24 116/71   06/03/24 122/66   03/12/24 135/83     Labs:   Lab Results   Component Value Date/Time     (H) 07/01/2023 07:32 AM     07/01/2023 07:32 AM    K 4.2 07/01/2023 07:32 AM     07/01/2023 07:32 AM    CO2 29.0 07/01/2023 07:32 AM    CREATSERUM 0.46 (L) 07/01/2023 07:32 AM    CA 8.7 07/01/2023 07:32 AM    AST 13 (L) 07/01/2023 07:32 AM    ALT 19 07/01/2023 07:32 AM    TSH 0.914 09/16/2022 10:55 AM        Lab Results   Component Value Date/Time    CHOLEST 186 07/01/2023 07:32 AM    HDL 68 (H) 07/01/2023 07:32 AM    TRIG 71 07/01/2023 07:32 AM     (H) 07/01/2023 07:32 AM    NONHDLC 118 07/01/2023 07:32 AM          HPI      Review of Systems   Constitutional:  Negative for activity change, appetite change, chills, diaphoresis, fatigue, fever and unexpected weight change.   HENT:  Negative for congestion, dental problem, drooling, ear discharge, ear pain, facial swelling, hearing loss, mouth sores, nosebleeds, postnasal drip, rhinorrhea, sinus pressure, sinus pain, sneezing, sore throat, tinnitus, trouble swallowing and voice change.    Eyes:   Negative for photophobia, pain, discharge, redness, itching and visual disturbance.   Respiratory:  Negative for apnea, cough, choking, chest tightness, shortness of breath, wheezing and stridor.    Cardiovascular:  Negative for chest pain, palpitations and leg swelling.   Gastrointestinal:  Negative for abdominal distention, abdominal pain, anal bleeding, blood in stool, constipation, diarrhea, nausea, rectal pain and vomiting.   Endocrine: Negative for cold intolerance, heat intolerance, polydipsia, polyphagia and polyuria.   Genitourinary:  Negative for decreased urine volume, difficulty urinating, dysuria, flank pain, frequency, hematuria, menstrual problem, pelvic pain, urgency, vaginal bleeding, vaginal discharge and vaginal pain.   Skin:  Negative for rash.   Neurological:  Negative for dizziness, tremors, seizures, syncope, facial asymmetry, speech difficulty, weakness, light-headedness, numbness and headaches.   Psychiatric/Behavioral:  Negative for agitation, behavioral problems, confusion, decreased concentration, dysphoric mood, hallucinations, self-injury, sleep disturbance and suicidal ideas. The patient is not nervous/anxious and is not hyperactive.    All other systems reviewed and are negative.        Current Outpatient Medications   Medication Sig Dispense Refill    Phentermine HCl 15 MG Oral Cap Take 1 capsule (15 mg total) by mouth every morning. 30 capsule 5    METFORMIN 500 MG Oral Tab TAKE 1 TABLET(500 MG) BY MOUTH EVERY 12 HOURS 180 tablet 1    Norgestim-Eth Estrad Triphasic (ORTHO TRI-CYCLEN LO) 0.18/0.215/0.25 MG-25 MCG Oral Tab Take 1 tablet by mouth daily. (Patient taking differently: Take 1 tablet by mouth every evening.) 84 tablet 3    methylPREDNISolone (MEDROL) 4 MG Oral Tablet Therapy Pack As directed. 1 each 0    ondansetron (ZOFRAN) 4 mg tablet Take 1 tablet (4 mg total) by mouth every 8 (eight) hours as needed for Nausea. 10 tablet 3    Glucose Blood (ONETOUCH ULTRA) In Vitro  Strip CHECK BLOOD SUGAR TWICE DAILY 200 strip 3    fluconazole 200 MG Oral Tab Take 4 tablets (800 mg total) by mouth daily. 28 tablet 0    Lancets Does not apply Misc Check blood sugar twice daily. 50 each 2    Blood Glucose Monitoring Suppl (ONETOUCH ULTRA 2) w/Device Does not apply Kit Check blood sugar 2 times daily 1 kit 0    Continuous Blood Gluc  (DEXCOM G6 ) Does not apply Device 1 each by Does not apply route See Admin Instructions. 1 Device 0    Continuous Blood Gluc Sensor (DEXCOM G6 SENSOR) Does not apply Misc 1 each by Does not apply route See Admin Instructions. 9 each 3    Continuous Blood Gluc Transmit (DEXCOM G6 TRANSMITTER) Does not apply Misc 1 each by Does not apply route See Admin Instructions. 3 each 3    Multiple Vitamin (MULTIVITAMINS OR) Take by mouth. Bariatric vitamins       semaglutide (OZEMPIC, 0.25 OR 0.5 MG/DOSE,) 2 MG/3ML Subcutaneous Solution Pen-injector Inject 0.25 mg into the skin once a week. 6 mL 3    pantoprazole 40 MG Oral Tab EC Take 1 tablet (40 mg total) by mouth As Directed. (Patient not taking: Reported on 11/29/2023)       Allergies:No Known Allergies    HISTORY:  Past Medical History:    Abrasion of toe of left foot    Class 3 severe obesity due to excess calories with serious comorbidity and body mass index (BMI) of 40.0 to 44.9 in adult ([facility])    Diabetes (HCC)    Diabetes mellitus, type 2 (HCC)    Essential hypertension    High blood pressure    High cholesterol    High cholesterol    History of blood transfusion    no reactions    Hypercholesterolemia with hypertriglyceridemia    Intertrigo    Morbid (severe) obesity due to excess calories (HCC)    Morbid obesity with BMI of 45.0-49.9, adult (HCC)    Morbid obesity with BMI of 50.0-59.9, adult ([facility])    S/P gastric bypass    Uncontrolled type 2 diabetes mellitus with hyperglycemia (HCC)    Visual impairment    GLASSES      Past Surgical History:   Procedure Laterality Date    Hc surgery catheter eps  dx/ablation other than 3d c1733      abdominoplasty, removal of excess skin on bilateral arms    Lap gastric bypass/nayan-en-y  09/14/2020    Dr Black, [facility]      Family History   Problem Relation Age of Onset    Hypertension Father     Diabetes Father     Diabetes Mother     Breast Cancer Mother     Other (EToHism. ) Maternal Grandmother     Diabetes Paternal Grandmother     Hypertension Paternal Grandmother     Lipids Paternal Grandmother     Renal Disease Paternal Grandmother     Diabetes Paternal Grandfather         Type 1, IDDM with retinoapthy.     Stroke Paternal Grandfather     Other (PVD) Paternal Grandfather     Other (retinopathy) Paternal Grandfather     Breast Cancer Paternal Aunt       Social History:   Social History     Socioeconomic History    Marital status: Single   Occupational History    Occupation: HR.    Tobacco Use    Smoking status: Never     Passive exposure: Never    Smokeless tobacco: Never   Vaping Use    Vaping status: Never Used   Substance and Sexual Activity    Alcohol use: Yes     Comment: socially     Drug use: Never        PHYSICAL EXAM:   /71 (BP Location: Left arm, Patient Position: Sitting, Cuff Size: adult)   Pulse 82   Temp 98.4 °F (36.9 °C) (Oral)   Ht 4' 9\" (1.448 m)   Wt 138 lb 12.8 oz (63 kg)   LMP  (LMP Unknown)   SpO2 100%   BMI 30.04 kg/m²   BP Readings from Last 3 Encounters:   09/25/24 116/71   06/03/24 122/66   03/12/24 135/83     Wt Readings from Last 3 Encounters:   09/25/24 138 lb 12.8 oz (63 kg)   09/23/24 131 lb (59.4 kg)   06/03/24 135 lb 11.2 oz (61.6 kg)       Physical Exam  Vitals and nursing note reviewed.   Constitutional:       General: She is not in acute distress.     Appearance: Normal appearance. She is well-developed and well-groomed. She is not ill-appearing, toxic-appearing or diaphoretic.      Interventions: She is not intubated.  HENT:      Head: Normocephalic and atraumatic.      Right Ear: Tympanic membrane, ear canal  and external ear normal. No decreased hearing noted. No laceration, drainage, swelling or tenderness. No middle ear effusion. There is no impacted cerumen. No foreign body. No mastoid tenderness. No PE tube. No hemotympanum. Tympanic membrane is not injected, scarred, perforated, erythematous, retracted or bulging. Tympanic membrane has normal mobility.      Left Ear: Tympanic membrane, ear canal and external ear normal. No decreased hearing noted. No laceration, drainage, swelling or tenderness.  No middle ear effusion. There is no impacted cerumen. No foreign body. No mastoid tenderness. No PE tube. No hemotympanum. Tympanic membrane is not injected, scarred, perforated, erythematous, retracted or bulging. Tympanic membrane has normal mobility.      Nose:      Right Sinus: No maxillary sinus tenderness or frontal sinus tenderness.      Left Sinus: No maxillary sinus tenderness or frontal sinus tenderness.      Mouth/Throat:      Lips: Pink. No lesions.      Mouth: Mucous membranes are moist. No injury, lacerations, oral lesions or angioedema.      Dentition: No dental tenderness, gingival swelling, dental abscesses or gum lesions.      Tongue: No lesions. Tongue does not deviate from midline.      Palate: No mass and lesions.      Pharynx: Oropharynx is clear. Uvula midline. No pharyngeal swelling, oropharyngeal exudate, posterior oropharyngeal erythema or uvula swelling.      Tonsils: No tonsillar exudate or tonsillar abscesses.   Eyes:      General: Lids are normal. Gaze aligned appropriately. No scleral icterus.        Right eye: No foreign body, discharge or hordeolum.         Left eye: No foreign body, discharge or hordeolum.      Extraocular Movements: Extraocular movements intact.      Right eye: Normal extraocular motion and no nystagmus.      Left eye: Normal extraocular motion and no nystagmus.      Conjunctiva/sclera: Conjunctivae normal.      Right eye: Right conjunctiva is not injected. No chemosis,  exudate or hemorrhage.     Left eye: Left conjunctiva is not injected. No chemosis, exudate or hemorrhage.     Pupils: Pupils are equal, round, and reactive to light.   Neck:      Thyroid: No thyroid mass, thyromegaly or thyroid tenderness.      Vascular: Normal carotid pulses. No carotid bruit, hepatojugular reflux or JVD.      Trachea: Trachea and phonation normal. No tracheal tenderness, tracheostomy, abnormal tracheal secretions or tracheal deviation.   Cardiovascular:      Rate and Rhythm: Normal rate and regular rhythm.      Pulses: Normal pulses. No decreased pulses.           Carotid pulses are 2+ on the right side and 2+ on the left side.       Radial pulses are 2+ on the right side and 2+ on the left side.        Dorsalis pedis pulses are 2+ on the right side and 2+ on the left side.        Posterior tibial pulses are 2+ on the right side and 2+ on the left side.      Heart sounds: Normal heart sounds, S1 normal and S2 normal.   Pulmonary:      Effort: Pulmonary effort is normal. No tachypnea, bradypnea, accessory muscle usage, prolonged expiration, respiratory distress or retractions. She is not intubated.      Breath sounds: Normal breath sounds and air entry. No stridor, decreased air movement or transmitted upper airway sounds. No decreased breath sounds, wheezing, rhonchi or rales.   Chest:      Chest wall: No tenderness.   Abdominal:      General: Bowel sounds are normal. There is no distension.      Palpations: Abdomen is soft. Abdomen is not rigid. There is no fluid wave, hepatomegaly, splenomegaly or mass.      Tenderness: There is no abdominal tenderness. There is no right CVA tenderness, left CVA tenderness, guarding or rebound.   Musculoskeletal:      Right hand: Decreased range of motion. Normal sensation. Normal capillary refill. Normal pulse.        Hands:       Cervical back: Neck supple. No tenderness.      Right lower leg: No edema.      Left lower leg: No edema.   Lymphadenopathy:       Head:      Right side of head: No submental, submandibular, preauricular, posterior auricular or occipital adenopathy.      Left side of head: No submental, submandibular, preauricular, posterior auricular or occipital adenopathy.      Cervical: No cervical adenopathy.      Right cervical: No superficial, deep or posterior cervical adenopathy.     Left cervical: No superficial, deep or posterior cervical adenopathy.      Upper Body:      Right upper body: No supraclavicular adenopathy.      Left upper body: No supraclavicular adenopathy.   Skin:     General: Skin is warm and dry.      Coloration: Skin is not pale.      Findings: No erythema or rash.   Neurological:      Mental Status: She is alert and oriented to person, place, and time.   Psychiatric:         Mood and Affect: Mood normal.         Speech: Speech normal.         Behavior: Behavior normal. Behavior is cooperative.         Thought Content: Thought content normal.              ASSESSMENT/PLAN:     Encounter Diagnoses   Name Primary?    Essential hypertension Stable. Careful with diet and excercise at least 30 minutes 3-4 times a week. Check blood pressures at different times on different days. Can purchase own blood pressure monitor. If not, check at local pharmacy. Bake foods more and grill occasionally. Avoid fried foods. No salt. Use other seasonings.     Yes    Hypercholesterolemia with hypertriglyceridemia Check blood.        Type 2 diabetes mellitus with hypoglycemia without coma, without long-term current use of insulin (HCC) Stable. Careful with diet and excercise at least 30 minutes 3-4 times a week. Check sugars at different times on different dates. Careful with low sugars. Carry something with you and check sugar if can. Can carry dylan cracker, etc. Decrease carbohydrates. But also, careful with fruits and natural sugars.One serving a day and no more than 1 handful every day. Check feet  every AM and careful with sores and ulcers on feet  bilaterally. Check eyes every year with dilated eye exam.  Check sugars.  2-hour postmeal should be less than 140s.  Pre-meal should be 's.  Both equally affected A1c.  Discussed importance of glycemic control to prevent complications of diabetes  -Discussed complications of diabetes include retinopathy, neuropathy, nephropathy and cardiovascular disease  -Discussed ABCs of DM  -Discussed importance of SBGM  -Discussed importance of low CHO diet, recommend 45gm per meal or 135gm per day  -UTD with optho  -Normotensive        Vaccine counseling Recc. Covid booster and flu shots. Separate all vaccines  by 2 weeks. Can do at local pharmacy. Flu can schedule as RN only visit after 9-30-24 when we get.        Vitamin D deficiency Check blood.        Encounter for screening mammogram for breast cancer Check mammogram. Continue self breast exam every month.        R little Fx. Finger. Check ECG and UA.   Current Outpatient Medications   Medication Sig Instructions Comments    Phentermine HCl 15 MG Oral Cap Take 1 capsule (15 mg total) by mouth every morning. Hold 48 hrs. Prior.      METFORMIN 500 MG Oral Tab TAKE 1 TABLET(500 MG) BY MOUTH EVERY 12 HOURS Hold morning of     Norgestim-Eth Estrad Triphasic (ORTHO TRI-CYCLEN LO) 0.18/0.215/0.25 MG-25 MCG Oral Tab Take 1 tablet by mouth daily. (Patient taking differently: Take 1 tablet by mouth every evening.) Take as usual     methylPREDNISolone (MEDROL) 4 MG Oral Tablet Therapy Pack As directed. Hold morning of     ondansetron (ZOFRAN) 4 mg tablet Take 1 tablet (4 mg total) by mouth every 8 (eight) hours as needed for Nausea. Take as usual     Glucose Blood (ONETOUCH ULTRA) In Vitro Strip CHECK BLOOD SUGAR TWICE DAILY Take as usual     fluconazole 200 MG Oral Tab Take 4 tablets (800 mg total) by mouth daily. Take as usual     Lancets Does not apply Misc Check blood sugar twice daily. Take as usual     Blood Glucose Monitoring Suppl (ONETOUCH ULTRA 2) w/Device Does not  apply Kit Check blood sugar 2 times daily Take as usual     Continuous Blood Gluc  (DEXCOM G6 ) Does not apply Device 1 each by Does not apply route See Admin Instructions. Take as usual     Continuous Blood Gluc Sensor (DEXCOM G6 SENSOR) Does not apply Misc 1 each by Does not apply route See Admin Instructions. Take as usual     Continuous Blood Gluc Transmit (DEXCOM G6 TRANSMITTER) Does not apply Misc 1 each by Does not apply route See Admin Instructions. Take as usual     Multiple Vitamin (MULTIVITAMINS OR) Take by mouth. Bariatric vitamins  Hold 1 week prior.      semaglutide (OZEMPIC, 0.25 OR 0.5 MG/DOSE,) 2 MG/3ML Subcutaneous Solution Pen-injector Inject 0.25 mg into the skin once a week. Hold 1 week prior.      pantoprazole 40 MG Oral Tab EC Take 1 tablet (40 mg total) by mouth As Directed. (Patient not taking: Reported on 11/29/2023) Take as usual       No motrin, ibuprofen, advil, alleve, naprosyn at least 10 days prior to surgery. Take all other medications with small sips of water on AM of surgery, unless otherwise directed. Don't eat breakfest that AM. All other medications take with a sip of water even on the morning of surgery.  Hold all over-the-counter natural herbal supplements and vitamins at least a week before surgery.  Anticoagulants: hold plavix for 5 days prior to procedure with cardiology approval, discussed with patient the increased risk of stroke/MI when alterations in therapy occur - patient verbalizes understanding and wishes to proceed. Aspirin 81 mg patient instructed to continue this.   Orders Placed This Encounter   Procedures    Lipid Panel    Comp Metabolic Panel (14)    Vitamin D    URINALYSIS, AUTO, W/O SCOPE    CBC With Differential With Platelet    Vitamin D, 25-Hydroxy       Meds This Visit:  Requested Prescriptions      No prescriptions requested or ordered in this encounter       Imaging & Referrals:  ELECTROCARDIOGRAM, COMPLETE  [facility] PERNELL 2D+3D SCREENING  MARIIA (CPT=[id number]/[id number])        RTC after 3-12-25 for physical.

## 2024-09-25 NOTE — ANESTHESIA POSTPROCEDURE EVALUATION
Patient: Gisell Zepeda    Procedure Summary       Date: 09/25/24 Room / Location: [facility] MAIN OR  / [facility] MAIN OR    Anesthesia Start: 1209 Anesthesia Stop:     Procedure: Right small finger proximal phalanx closed reduction percutaneous pinning versus possible open reduction internal fixation (Right) Diagnosis:       Closed displaced fracture of proximal phalanx of right little finger, initial encounter      (Closed displaced fracture of proximal phalanx of right little finger, initial encounter [S62.606P])    Surgeons: Luis Mayfield MD Anesthesiologist: Desmond Nielsen MD    Anesthesia Type: general ASA Status: 3            Anesthesia Type: general    Vitals Value Taken Time   /79 09/25/24 1251   Temp 98.3 °F (36.8 °C) 09/25/24 1251   Pulse 81 09/25/24 1251   Resp 16 09/25/24 1251   SpO2 95 % 09/25/24 1251   Vitals shown include unfiled device data.    [facility] AN Post Evaluation:   Patient Evaluated in PACU  Patient Participation: complete - patient participated  Level of Consciousness: awake  Pain Management: adequate  Airway Patency:patent  Dental exam unchanged from preop  Yes    Cardiovascular Status: acceptable  Respiratory Status: acceptable  Postoperative Hydration acceptable      DESMOND NIELSEN MD  9/25/2024 12:52 PM

## 2024-09-25 NOTE — PATIENT INSTRUCTIONS
ASSESSMENT/PLAN:     Encounter Diagnoses   Name Primary?    Essential hypertension Stable. Careful with diet and excercise at least 30 minutes 3-4 times a week. Check blood pressures at different times on different days. Can purchase own blood pressure monitor. If not, check at local pharmacy. Bake foods more and grill occasionally. Avoid fried foods. No salt. Use other seasonings.     Yes    Hypercholesterolemia with hypertriglyceridemia Check blood.        Type 2 diabetes mellitus with hypoglycemia without coma, without long-term current use of insulin (HCC) Stable. Careful with diet and excercise at least 30 minutes 3-4 times a week. Check sugars at different times on different dates. Careful with low sugars. Carry something with you and check sugar if can. Can carry dylan cracker, etc. Decrease carbohydrates. But also, careful with fruits and natural sugars.One serving a day and no more than 1 handful every day. Check feet  every AM and careful with sores and ulcers on feet bilaterally. Check eyes every year with dilated eye exam.  Check sugars.  2-hour postmeal should be less than 140s.  Pre-meal should be 's.  Both equally affected A1c.  Discussed importance of glycemic control to prevent complications of diabetes  -Discussed complications of diabetes include retinopathy, neuropathy, nephropathy and cardiovascular disease  -Discussed ABCs of DM  -Discussed importance of SBGM  -Discussed importance of low CHO diet, recommend 45gm per meal or 135gm per day  -UTD with optho  -Normotensive        Vaccine counseling Recc. Covid booster and flu shots. Separate all vaccines  by 2 weeks. Can do at local pharmacy. Flu can schedule as RN only visit after 9-30-24 when we get.        Vitamin D deficiency Check blood.        Encounter for screening mammogram for breast cancer Check mammogram. Continue self breast exam every month.        R little Fx. Finger. Check ECG and UA.   Current Outpatient Medications    Medication Sig Instructions Comments    Phentermine HCl 15 MG Oral Cap Take 1 capsule (15 mg total) by mouth every morning. Hold 48 hrs. Prior.      METFORMIN 500 MG Oral Tab TAKE 1 TABLET(500 MG) BY MOUTH EVERY 12 HOURS Hold morning of     Norgestim-Eth Estrad Triphasic (ORTHO TRI-CYCLEN LO) 0.18/0.215/0.25 MG-25 MCG Oral Tab Take 1 tablet by mouth daily. (Patient taking differently: Take 1 tablet by mouth every evening.) Take as usual     methylPREDNISolone (MEDROL) 4 MG Oral Tablet Therapy Pack As directed. Hold morning of     ondansetron (ZOFRAN) 4 mg tablet Take 1 tablet (4 mg total) by mouth every 8 (eight) hours as needed for Nausea. Take as usual     Glucose Blood (ONETOUCH ULTRA) In Vitro Strip CHECK BLOOD SUGAR TWICE DAILY Take as usual     fluconazole 200 MG Oral Tab Take 4 tablets (800 mg total) by mouth daily. Take as usual     Lancets Does not apply Misc Check blood sugar twice daily. Take as usual     Blood Glucose Monitoring Suppl (ONETOUCH ULTRA 2) w/Device Does not apply Kit Check blood sugar 2 times daily Take as usual     Continuous Blood Gluc  (DEXCOM G6 ) Does not apply Device 1 each by Does not apply route See Admin Instructions. Take as usual     Continuous Blood Gluc Sensor (DEXCOM G6 SENSOR) Does not apply Misc 1 each by Does not apply route See Admin Instructions. Take as usual     Continuous Blood Gluc Transmit (DEXCOM G6 TRANSMITTER) Does not apply Misc 1 each by Does not apply route See Admin Instructions. Take as usual     Multiple Vitamin (MULTIVITAMINS OR) Take by mouth. Bariatric vitamins  Hold 1 week prior.      semaglutide (OZEMPIC, 0.25 OR 0.5 MG/DOSE,) 2 MG/3ML Subcutaneous Solution Pen-injector Inject 0.25 mg into the skin once a week. Hold 1 week prior.      pantoprazole 40 MG Oral Tab EC Take 1 tablet (40 mg total) by mouth As Directed. (Patient not taking: Reported on 11/29/2023) Take as usual       No motrin, ibuprofen, advil, alleve, naprosyn at least 10  days prior to surgery. Take all other medications with small sips of water on AM of surgery, unless otherwise directed. Don't eat breakfest that AM. All other medications take with a sip of water even on the morning of surgery.  Hold all over-the-counter natural herbal supplements and vitamins at least a week before surgery.  Anticoagulants: hold plavix for 5 days prior to procedure with cardiology approval, discussed with patient the increased risk of stroke/MI when alterations in therapy occur - patient verbalizes understanding and wishes to proceed. Aspirin 81 mg patient instructed to continue this.   Orders Placed This Encounter   Procedures    Lipid Panel    Comp Metabolic Panel (14)    Vitamin D    URINALYSIS, AUTO, W/O SCOPE    CBC With Differential With Platelet    Vitamin D, 25-Hydroxy       Meds This Visit:  Requested Prescriptions      No prescriptions requested or ordered in this encounter       Imaging & Referrals:  ELECTROCARDIOGRAM, COMPLETE  [facility] PERNELL 2D+3D SCREENING BILAT (CPT=77067/[id number])        RTC after 3-12-25 for physical.

## 2024-09-25 NOTE — BRIEF OP NOTE
Pre-Operative Diagnosis: Closed displaced fracture of proximal phalanx of right little finger, initial encounter [Z94.884K]     Post-Operative Diagnosis: Closed displaced fracture of proximal phalanx of right little finger, initial encounter [F18.875Y]      Procedure Performed:   Right small finger proximal phalanx closed reduction percutaneous pinning versus possible open reduction internal fixation    Surgeons and Role:     * Luis Mayfield MD - Primary    Assistant(s):  Surgical Assistant.: Sid Horne CSA     Surgical Findings: displaced P1 base fracture     Specimen: None     Estimated Blood Loss: 2cc    Dictation Number:  N/A    Luis Mayfield MD  9/25/2024  12:44 PM

## 2024-09-26 ENCOUNTER — PATIENT MESSAGE (OUTPATIENT)
Dept: INTERNAL MEDICINE CLINIC | Facility: CLINIC | Age: 39
End: 2024-09-26

## 2024-09-26 PROBLEM — D64.9 ANEMIA: Status: ACTIVE | Noted: 2024-09-26

## 2024-09-26 LAB
BASOPHILS # BLD AUTO: 0.05 X10(3) UL (ref 0–0.2)
BASOPHILS NFR BLD AUTO: 0.8 %
DEPRECATED RDW RBC AUTO: 42.3 FL (ref 35.1–46.3)
EOSINOPHIL # BLD AUTO: 0.13 X10(3) UL (ref 0–0.7)
EOSINOPHIL NFR BLD AUTO: 2.1 %
ERYTHROCYTE [DISTWIDTH] IN BLOOD BY AUTOMATED COUNT: 17.7 % (ref 11–15)
HCT VFR BLD AUTO: 30.4 %
HGB BLD-MCNC: 8.5 G/DL
IMM GRANULOCYTES # BLD AUTO: 0.01 X10(3) UL (ref 0–1)
IMM GRANULOCYTES NFR BLD: 0.2 %
LYMPHOCYTES # BLD AUTO: 2.47 X10(3) UL (ref 1–4)
LYMPHOCYTES NFR BLD AUTO: 40 %
MCH RBC QN AUTO: 19 PG (ref 26–34)
MCHC RBC AUTO-ENTMCNC: 28 G/DL (ref 31–37)
MCV RBC AUTO: 67.9 FL
MONOCYTES # BLD AUTO: 0.34 X10(3) UL (ref 0.1–1)
MONOCYTES NFR BLD AUTO: 5.5 %
NEUTROPHILS # BLD AUTO: 3.17 X10 (3) UL (ref 1.5–7.7)
NEUTROPHILS # BLD AUTO: 3.17 X10(3) UL (ref 1.5–7.7)
NEUTROPHILS NFR BLD AUTO: 51.4 %
PLATELET # BLD AUTO: 386 10(3)UL (ref 150–450)
RBC # BLD AUTO: 4.48 X10(6)UL
WBC # BLD AUTO: 6.2 X10(3) UL (ref 4–11)

## 2024-09-26 RX ORDER — ATORVASTATIN CALCIUM 10 MG/1
10 TABLET, FILM COATED ORAL NIGHTLY
Qty: 90 TABLET | Refills: 3 | Status: SHIPPED | OUTPATIENT
Start: 2024-09-26

## 2024-09-26 RX ORDER — OXYCODONE HYDROCHLORIDE 5 MG/1
5 TABLET ORAL EVERY 4 HOURS PRN
Qty: 12 TABLET | Refills: 0 | Status: SHIPPED | OUTPATIENT
Start: 2024-09-26

## 2024-09-26 NOTE — TELEPHONE ENCOUNTER
S/w [facility] in [place] and I cancelled Rx for oxycodone as it can not be transferred because it is narcotic.    Updated pharmacy    Please send in oxyconde Rx for patient

## 2024-09-26 NOTE — TELEPHONE ENCOUNTER
Dr. Delgado, please see [facility] update and advise.  Medications pended, please review and sign if appropriate. Thanks.    Please respond directly to the patient if no additional staff support is required.     Future Appointments   Date Time Provider Department Center   10/11/2024 10:15 AM Luis Mayfield MD [facility]   1/3/2025  9:00 AM Rex Fishman MD [address]     Medication list updated.

## 2024-09-26 NOTE — TELEPHONE ENCOUNTER
From: Gisell Zepeda  To: Luis Mayfield  Sent: 9/25/2024 5:04 PM CDT  Subject: Medication    Good afternoon Dr. PARK had to get my prescription transferred to a different [facility] and all of them got transferred except for the oxycodone they said because it is an narcotic it has to be requested through you directly to the pharmacy. Can you please put a new request in for it to be picked up at the [facility] on [address] in [facility] in [place]

## 2024-09-26 NOTE — PROGRESS NOTES
CBC Normal (white blood cells and platelets), anemia is present.  Awaiting some more blood work.  CMP Normal (electrolytes, sugar, kidney and liver functions),   Lipid (choilesterol) is better but still elevated.  Goal LDL should be less than 70.Careful with diet.  Avoid red meat, shellfish, pork.  Try not to crawford foods.  Lower carb diet.  Exercise is most part at least 30 minutes 3-4 times a week sustained cardiovascular aerobic exercise getting that heart rate going and keeping it going for 30 minutes at a time.  If cannot do 30 will start with 10 minutes of brisk walking is good at each week build up 5 minutes more.  Recheck lipid in 3 months.  Orders written.  Would recommend a cholesterol-lowering medicine i.e. atorvastatin at a low dose 10 mg at night.  If okay can send to the pharmacy?  Vitamin D level is lower than prior slightly.  Would add an extra vitamin D 400 IUs over-the-counter once a day and recheck vitamin D level in 3 months.  Orders written.  Hemoglobin A1c which is a 3-month average of sugars is significantly worse than prior steadily increasing.  Goal is less than 6.5.Careful with diet and excercise at least 30 minutes 3-4 times a week. Check sugars at different times on different dates. Careful with low sugars. Carry something with you and check sugar if can. Can carry dylan cracker, etc. Decrease carbohydrates. But also, careful with fruits and natural sugars.One serving a day and no more than 1 handful every day. Check feet  every AM and careful with sores and ulcers on feet bilaterally. Check eyes every year with dilated eye exam.  Check sugars.  2-hour postmeal should be less than 140s.  Pre-meal should be 's.  Both equally affected A1c.  Recheck A1c in 3 months.  Orders written.  Would recommend increasing metformin to 1-1/2 tablet prebreakfast and predinner at least 10 to 15 minutes before.  Call with sugars in 2 weeks.  Careful with low sugars.    Please update med list.

## 2024-09-26 NOTE — TELEPHONE ENCOUNTER
From: Gisell Zepeda  To: Althea Delgado  Sent: 9/26/2024 8:41 AM CDT  Subject: Test results follow-up    Good morning I just saw my results regarding my cholesterol. Yes, I would be fine. Taking a small dose to get it regulated and I will start also, the vitamin D. If there’s anything else that you feel that I need to take, please prescribe those as well however, can you call in the prescription to the [facility] on [facility] in [place]

## 2024-10-11 ENCOUNTER — HOSPITAL ENCOUNTER (OUTPATIENT)
Dept: GENERAL RADIOLOGY | Facility: HOSPITAL | Age: 39
Discharge: HOME | End: 2024-10-11
Attending: STUDENT IN AN ORGANIZED HEALTH CARE EDUCATION/TRAINING PROGRAM
Payer: COMMERCIAL

## 2024-10-11 ENCOUNTER — TELEPHONE (OUTPATIENT)
Dept: PHYSICAL THERAPY | Facility: HOSPITAL | Age: 39
End: 2024-10-11

## 2024-10-11 ENCOUNTER — OFFICE VISIT (OUTPATIENT)
Dept: ORTHOPEDICS CLINIC | Facility: CLINIC | Age: 39
End: 2024-10-11
Payer: COMMERCIAL

## 2024-10-11 DIAGNOSIS — Z47.89 ORTHOPEDIC AFTERCARE: Primary | ICD-10-CM

## 2024-10-11 DIAGNOSIS — Z47.89 ORTHOPEDIC AFTERCARE: ICD-10-CM

## 2024-10-11 PROCEDURE — 73130 X-RAY EXAM OF HAND: CPT | Performed by: STUDENT IN AN ORGANIZED HEALTH CARE EDUCATION/TRAINING PROGRAM

## 2024-10-13 NOTE — PROGRESS NOTES
SPLINT  EVALUATION:     Diagnosis:   Orthopedic aftercare (Z47.89)       Referring Provider: Luis Mayfield  Date of Evaluation:    10/13/2024    Precautions:  DM Next MD visit:   none scheduled  Date of Surgery: n/a     PATIENT SUMMARY   Gisell Zepeda is a 39 year old female who presents to therapy today s/p an ORIF of the Right small finger proximal phalanx fracture on 09/25/24.   Hx of condition: Pt was cranking a cross bowing and pt got finger stuck in the crank. Pt wait until the next day to go to the ER which identified the fracture. Pt's injury happen 9/16/24. Pt underwent surgery on 9/25/24 and just followed up with the doctor on 10/11/24.   Pt describes pain level: current 6/10, best 6/10, worst 8-9/10.  Current functional limitations include unable to use Right hand for adls and iadls.   Hand dominance: Right.    Gisell describes prior level of function independent in adls and iadls, working full time, using RUE.   Past medical history was reviewed with Gisell . Significant findings include   Past Medical History:    Abrasion of toe of left foot    Class 3 severe obesity due to excess calories with serious comorbidity and body mass index (BMI) of 40.0 to 44.9 in adult (HCC)    Diabetes (HCC)    Diabetes mellitus, type 2 (HCC)    Essential hypertension    High blood pressure    High cholesterol    High cholesterol    History of blood transfusion    no reactions    Hypercholesterolemia with hypertriglyceridemia    Intertrigo    Morbid (severe) obesity due to excess calories (HCC)    Morbid obesity with BMI of 45.0-49.9, adult (HCC)    Morbid obesity with BMI of 50.0-59.9, adult ([facility])    S/P gastric bypass    Uncontrolled type 2 diabetes mellitus with hyperglycemia (HCC)    Visual impairment    GLASSES         ASSESSMENT  Gisell presents to occupational therapy evaluation s/p an ORIF of the Right small finger proximal phalanx fracture on 09/25/24. OT fabricated Right UE ulnar gutter orthosis with IP included.  Pt and OT discussed evaluation findings, pt reports splint fits comfortably, and reports understanding for wearing schedule. Skilled Occupational Therapy is medically necessary to address the above impairments and reach functional goals.    OBJECTIVE   Observation: two percutaneous pins over MCP. Mild edema noted in wrist and hand. No drainage from incision sites. Pt arrived in ace bandage and hard ulnar gutter molding.    Extremity: Right hand    Today's Treatment:   Splint Fabricated: Ulnar Gutter Orthosis  Splint Wearing Schedule: at all times  Purpose of Splint: Immobilization    Charges: Orthotic Mgnt and Train x3  Total Timed Treatment: 38 min     Total Treatment Time: 43 min   PLAN OF CARE:    Goals: (to be met in 1 visits)  Pt will demonstrate independence with don/doff splint.  Pt will verbalize understanding of splint precautions and instructions for splint care.      Frequency / Duration: 1    Education or treatment limitation: None  Rehab Potential:excellent    Patient/Family/Caregiver was advised of these findings, precautions, and treatment options and has agreed to actively participate in planning and for this course of care.    Thank you for your referral. Please co-sign or sign and return this letter via fax as soon as possible to [phone]. If you have any questions, please contact me at Dept: [phone]    Sincerely,  Electronically signed by therapist: Julissa Tidwell OT  Physician's certification required: Yes  I certify the need for these services furnished under this plan of treatment and while under my care.    X___________________________________________________ Date____________________    Certification From: 10/13/2024  To:1/11/2025

## 2024-10-14 ENCOUNTER — OFFICE VISIT (OUTPATIENT)
Dept: OCCUPATIONAL MEDICINE | Facility: HOSPITAL | Age: 39
End: 2024-10-14
Attending: STUDENT IN AN ORGANIZED HEALTH CARE EDUCATION/TRAINING PROGRAM
Payer: COMMERCIAL

## 2024-10-14 ENCOUNTER — PATIENT MESSAGE (OUTPATIENT)
Dept: INTERNAL MEDICINE CLINIC | Facility: CLINIC | Age: 39
End: 2024-10-14

## 2024-10-14 DIAGNOSIS — Z47.89 ORTHOPEDIC AFTERCARE: Primary | ICD-10-CM

## 2024-10-14 PROCEDURE — 97760 ORTHOTIC MGMT&TRAING 1ST ENC: CPT

## 2024-10-15 NOTE — PROGRESS NOTES
Orthopaedic Surgery Postop Patient Visit  _______________________________________________________________________________________________________________  _______________________________________________________________________________________________________________    DATE OF VISIT: 10/11/2024     DATE OF SURGERY: 9/25/2024     CHIEF COMPLAINT: Follow-up Right hand 5th P1 base fracture stabilization surgery with CRPP       Chief Complaint   Patient presents with    Post-Op     1st POV R hand, 5 th finger. Pt rates pain 3-4. Some tingling.         HISTORY OF PRESENT ILLNESS: Gisell Zepeda is a 39 year old female who presents to the clinic for follow-up after Right hand CRPP. The patient has not started working with therapy. The patient reports normal sensation in all fingers. Pain is well controlled.    The patient denies fevers, chills, and wound issues.     MEDICATIONS  Reviewed   oxyCODONE 5 MG Oral Tab Take 1 tablet (5 mg total) by mouth every 4 (four) hours as needed for Pain. 12 tablet 0    atorvastatin 10 MG Oral Tab Take 1 tablet (10 mg total) by mouth nightly. 90 tablet 3    metFORMIN 500 MG Oral Tab Take 1.5 tabs before breakfast and 1.5 tabs before dinner 270 tablet 0    ondansetron (ZOFRAN) 4 mg tablet Take 1 tablet (4 mg total) by mouth every 8 (eight) hours as needed for Nausea. 10 tablet 0    Acetaminophen 500 MG Oral Cap Take 2 capsules (1,000 mg total) by mouth every 8 (eight) hours as needed for Pain. Never exceed 3000 mg in a 24-hour period.  Many over-the-counter medications also have acetaminophen in them. 180 capsule 0    celecoxib 100 MG Oral Cap Take 1 capsule (100 mg total) by mouth 2 (two) times daily. 60 capsule 0    senna-docusate 8.6-50 MG Oral Tab Take 2 tablets by mouth at bedtime. 28 tablet 0    Vitamin C 500 MG Oral Tab Take 1 tablet (500 mg total) by mouth in the morning and 1 tablet (500 mg total) before bedtime. 84 tablet 0    oxyCODONE 5 MG Oral Tab Take 1 tablet (5 mg total) by  mouth every 4 (four) hours as needed for Pain. 12 tablet 0    gabapentin 300 MG Oral Cap Take 1 capsule (300 mg total) by mouth every 8 (eight) hours. 30 capsule 0    Phentermine HCl 15 MG Oral Cap Take 1 capsule (15 mg total) by mouth every morning. 30 capsule 5    Norgestim-Eth Estrad Triphasic (ORTHO TRI-CYCLEN LO) 0.18/0.215/0.25 MG-25 MCG Oral Tab Take 1 tablet by mouth daily. (Patient taking differently: Take 1 tablet by mouth every evening.) 84 tablet 3    Glucose Blood (ONETOUCH ULTRA) In Vitro Strip CHECK BLOOD SUGAR TWICE DAILY 200 strip 3    fluconazole 200 MG Oral Tab Take 4 tablets (800 mg total) by mouth daily. 28 tablet 0    Lancets Does not apply Misc Check blood sugar twice daily. 50 each 2    Blood Glucose Monitoring Suppl (ONETOUCH ULTRA 2) w/Device Does not apply Kit Check blood sugar 2 times daily 1 kit 0    Continuous Blood Gluc  (DEXCOM G6 ) Does not apply Device 1 each by Does not apply route See Admin Instructions. 1 Device 0    Continuous Blood Gluc Sensor (DEXCOM G6 SENSOR) Does not apply Misc 1 each by Does not apply route See Admin Instructions. 9 each 3    Continuous Blood Gluc Transmit (DEXCOM G6 TRANSMITTER) Does not apply Misc 1 each by Does not apply route See Admin Instructions. 3 each 3    Multiple Vitamin (MULTIVITAMINS OR) Take by mouth. Bariatric vitamins           ALLERGIES  Allergies[1]     REVIEW OF SYSTEMS  A 14 point review of systems was performed. Pertinent positives and negatives noted in the HPI.    PHYSICAL EXAM  LMP  (LMP Unknown)      Constitutional: The patient is well-developed, well-nourished, in no acute distress.  Neurological: Alert and oriented to person, place, and time.  Psychiatric: Mood and affect normal.  Head: Normocephalic and atraumatic.  Cardiovascular: regular rate by palpation  Pulmonary/Chest: Effort normal. No respiratory distress. Breathing non-labored  Abdominal: Abdomen exhibits no distension.   Right hand  Pin sites well  appearing  ROM:  Fingers: Deferred/immobilized  Motor/Strength:  Motor intact AIN/PIN/U distributions   strength: Deferred  SILT M/U/R distributions  2+ radial pulse, brisk capillary refill to all fingers    IMAGING  I independently viewed and interpreted the imaging. Radiologist interpretation is available in the imaging report.  X-ray: Plain films demonstrate appropriate positioning of pins we will reduced P1 base fracture    ASSESSMENT/PLAN: Gisell Zepeda is a 39 year old female who presents to the Orthopaedic surgery clinic today for follow-up 2 weeks after Right hand fracture stabilization surgery.  She is doing well.    We discussed the relevant radiographs at today's visit  We reviewed the PT protocol and adjustments were made as needed  WEIGHT BEARING STATUS: Nonweightbearing  RANGE-OF-MOTION LIMITATIONS: immobilized in ulnar gutter splint  NEW PRESCRIPTIONS: none  IMAGING ORDERED: none  CONSULTS PLACED: Physical/Occupational therapy  PROSTHESES/ORTHOTICS:  Ulnar gutter splint, referred to therapy to make thermoplastic    FOLLOW-UP: 3 weeks    RADIOGRAPHS AT NEXT VISIT:  3 view right hand    I have personally seen Gisell Zepeda and discussed in detail their plan of care. Prior to departure, they indicated agreement with and understanding of their plan of care and their follow-up as documented herein this note. Please note that this note was written in combination with voice recognition/dictation software and there is a possibility of transcription errors which were not identified at the time of note submission. If clarification is necessary, please contact the author or clinic staff.    Luis Mayfield MD  Orthopaedic Surgery  10/14/2024          [1] No Known Allergies

## 2024-10-22 ENCOUNTER — NURSE TRIAGE (OUTPATIENT)
Dept: INTERNAL MEDICINE CLINIC | Facility: CLINIC | Age: 39
End: 2024-10-22

## 2024-10-22 RX ORDER — FLUCONAZOLE 150 MG/1
150 TABLET ORAL ONCE
Qty: 1 TABLET | Refills: 0 | Status: SHIPPED | OUTPATIENT
Start: 2024-10-22 | End: 2024-10-22

## 2024-10-22 NOTE — TELEPHONE ENCOUNTER
Action Requested: Summary for Provider     []  Critical Lab, Recommendations Needed  [] Need Additional Advice  []   FYI    []   Need Orders  [x] Need Medications Sent to Pharmacy  []  Other     SUMMARY: office visit. Patient requesting fluconazole to be sent to pharmacy. Medication pended for your review and approval.      Reason for call: Vaginal Discharge  Onset: Data Unavailable    Patient has had abnormal vagina discharge and itching for a week. She tends to get yeast infections. She has white discharge that has no order.      10/14/24  2:08 PM  Can you call in a prescription for Fluconazole. My sugar levels have been a little high and I got a yeast infection again. I have the same exact issues as the last time. If so can you send the request to the [facility] [address] [facility] in [place], [place]    Reason for Disposition   Symptoms of a yeast infection (i.e., itchy, white discharge, not bad smelling) and not improved > 3 days following Care Advice    Protocols used: Vaginal Symptoms-A-[place]

## 2024-10-25 ENCOUNTER — OFFICE VISIT (OUTPATIENT)
Dept: ORTHOPEDICS CLINIC | Facility: CLINIC | Age: 39
End: 2024-10-25
Payer: COMMERCIAL

## 2024-10-25 ENCOUNTER — HOSPITAL ENCOUNTER (OUTPATIENT)
Dept: GENERAL RADIOLOGY | Facility: HOSPITAL | Age: 39
Discharge: HOME | End: 2024-10-25
Attending: STUDENT IN AN ORGANIZED HEALTH CARE EDUCATION/TRAINING PROGRAM
Payer: COMMERCIAL

## 2024-10-25 DIAGNOSIS — Z47.89 ORTHOPEDIC AFTERCARE: ICD-10-CM

## 2024-10-25 DIAGNOSIS — Z47.89 ORTHOPEDIC AFTERCARE: Primary | ICD-10-CM

## 2024-10-25 PROCEDURE — 73130 X-RAY EXAM OF HAND: CPT | Performed by: STUDENT IN AN ORGANIZED HEALTH CARE EDUCATION/TRAINING PROGRAM

## 2024-10-25 PROCEDURE — 99024 POSTOP FOLLOW-UP VISIT: CPT | Performed by: STUDENT IN AN ORGANIZED HEALTH CARE EDUCATION/TRAINING PROGRAM

## 2024-10-25 RX ORDER — SULFAMETHOXAZOLE AND TRIMETHOPRIM 800; 160 MG/1; MG/1
1 TABLET ORAL 2 TIMES DAILY
Qty: 20 TABLET | Refills: 0 | Status: SHIPPED | OUTPATIENT
Start: 2024-10-25

## 2024-10-25 NOTE — PROGRESS NOTES
Orthopaedic Surgery Postop Patient Visit  _______________________________________________________________________________________________________________  _______________________________________________________________________________________________________________    DATE OF VISIT: 10/25/2024     DATE OF SURGERY: 9/25/2024     CHIEF COMPLAINT: Follow-up Right hand small finger P1 fracture stabilization surgery with CRPP       Chief Complaint   Patient presents with    Post-Op     S/p R 5th finger ORIF sx on 9/25/2024- per pt she noticed yellow drainage from the surgical site yesterday- rates pain 4-5/10 on and off        HISTORY OF PRESENT ILLNESS: Gisell Zepeda is a 39 year old female who presents to the clinic for follow-up after Right hand CRPP. The patient has not started working with therapy. The patient reports normal sensation in all fingers. Pain is about 4-5 out of 10.  She does endorse some wound issues at the more radial pin site.  She denies fevers or chills    MEDICATIONS  Reviewed   sulfamethoxazole-trimethoprim -160 MG Oral Tab per tablet Take 1 tablet by mouth 2 (two) times daily. 20 tablet 0        ALLERGIES  Allergies[1]     REVIEW OF SYSTEMS  A 14 point review of systems was performed. Pertinent positives and negatives noted in the HPI.    PHYSICAL EXAM  LMP  (LMP Unknown)      Constitutional: The patient is well-developed, well-nourished, in no acute distress.  Neurological: Alert and oriented to person, place, and time.  Psychiatric: Mood and affect normal.  Head: Normocephalic and atraumatic.  Cardiovascular: regular rate by palpation  Pulmonary/Chest: Effort normal. No respiratory distress. Breathing non-labored  Abdominal: Abdomen exhibits no distension.   Right hand  Pin sites erythematous with slight drainage.  Pins removed  ROM:  Fingers: Diminished  Wrist Flexion: 70  Wrist Extension: 70  Motor/Strength:  Motor intact AIN/PIN/U distributions   strength: Deferred  SILT M/U/R  distributions  2+ radial pulse, brisk capillary refill to all fingers    IMAGING  I independently viewed and interpreted the imaging. Radiologist interpretation is available in the imaging report.  X-ray: Plain films of the right hand before and after pin removal demonstrate appropriate interval healing with no evidence of displacement or persistent fracture    ASSESSMENT/PLAN: Gisell Zepeda is a 39 year old female who presents to the Orthopaedic surgery clinic today for follow-up 4 weeks after Right hand fracture stabilization surgery.  She is doing very well.  Pins were removed today.  Will now have her progress with PT/OT to restore her hand motion and function.  She should avoid impact activities and any heavy lifting on the hand.  She should continue to use her brace when she is moving around or at higher risk of being injured to prevent recurrence of her fracture.    We discussed the relevant radiographs at today's visit  We reviewed the PT protocol and adjustments were made as needed  WEIGHT BEARING STATUS: Partial weightbearing (<8lbs)  RANGE-OF-MOTION LIMITATIONS: as tolerated  NEW PRESCRIPTIONS:  Bactrim DS 10 days  IMAGING ORDERED: none  CONSULTS PLACED: Physical/Occupational therapy  PROSTHESES/ORTHOTICS: none    FOLLOW-UP: 4 weeks    RADIOGRAPHS AT NEXT VISIT:  3 view right hand    I have personally seen Gisell Zepeda and discussed in detail their plan of care. Prior to departure, they indicated agreement with and understanding of their plan of care and their follow-up as documented herein this note. Please note that this note was written in combination with voice recognition/dictation software and there is a possibility of transcription errors which were not identified at the time of note submission. If clarification is necessary, please contact the author or clinic staff.    Luis Mayfield MD  Orthopaedic Surgery  10/25/2024          [1] No Known Allergies

## 2024-11-05 ENCOUNTER — OFFICE VISIT (OUTPATIENT)
Dept: OCCUPATIONAL MEDICINE | Facility: HOSPITAL | Age: 39
End: 2024-11-05
Attending: STUDENT IN AN ORGANIZED HEALTH CARE EDUCATION/TRAINING PROGRAM
Payer: COMMERCIAL

## 2024-11-05 DIAGNOSIS — Z47.89 ORTHOPEDIC AFTERCARE: Primary | ICD-10-CM

## 2024-11-05 PROCEDURE — 97760 ORTHOTIC MGMT&TRAING 1ST ENC: CPT

## 2024-11-05 PROCEDURE — 97110 THERAPEUTIC EXERCISES: CPT

## 2024-11-05 PROCEDURE — 97166 OT EVAL MOD COMPLEX 45 MIN: CPT

## 2024-11-05 NOTE — PROGRESS NOTES
OCCUPATIONAL THERAPY UPPER EXTREMITY EVALUATION     Diagnosis:   Orthopedic aftercare (Z47.89)       Referring Provider: Luis Mayfield  Date of Evaluation:    11/5/2024    Precautions:   following [place] Hand Protocols Next MD visit:   none scheduled  Date of Surgery: n/a     PATIENT SUMMARY   Gisell Zepeda is a 39 year old female who presents to therapy today s/p an CRPP of the Right small finger proximal phalanx fracture on 09/16/24.   Hx of condition: Pt was cranking a cross bowing and pt got finger stuck in the crank. Pt wait until the next day to go to the ER which identified the fracture. Pt's injury happen 9/16/24. Pt underwent surgery on 9/25/24. Ulnar Gutter Orthosis fabricated on 10/14/24. Pt ended up having pins removed early d/t infection on 10/25/24.    Pt describes pain level current 3/10, at best 3/10, at worst 6/10.   Current functional limitations include difficulty with opening items, holding mugs, numbness with gripping steering wheel, writing, holding utensils, cutting food, and don/doffing clothes    Gisell describes prior level of function independent in adls and iadls, working full time, using RUE.   Employment:  Administrative worker  Hand Dominance: right  Living Situation:  living with boyfriend and kids (2, 9, 17)  Imaging/Tests: X-ray taken to R hand on 10/25/24  CONCLUSION:   1. Healing comminuted fracture base of proximal phalanx 5th digit.   2.  K-wires have been removed .  Position and alignment is anatomic.    3. Disuse osteopenia    Dictated by (CST): Jaiden Jackson MD on 10/25/2024 at 3:17 PM       Finalized by (CST): Jaiden Jackson MD on 10/25/2024 at 3:19 PM   Pt goals include using R hand back to normal.  Past medical history was reviewed with Gisell. Significant findings include   Past Medical History:    Abrasion of toe of left foot    Class 3 severe obesity due to excess calories with serious comorbidity and body mass index (BMI) of 40.0 to 44.9 in adult ([facility])     Diabetes (HCC)    Diabetes mellitus, type 2 (HCC)    Essential hypertension    High blood pressure    High cholesterol    High cholesterol    History of blood transfusion    no reactions    Hypercholesterolemia with hypertriglyceridemia    Intertrigo    Morbid (severe) obesity due to excess calories (HCC)    Morbid obesity with BMI of 45.0-49.9, adult (HCC)    Morbid obesity with BMI of 50.0-59.9, adult (HCC)    S/P gastric bypass    Uncontrolled type 2 diabetes mellitus with hyperglycemia (HCC)    Visual impairment    GLASSES         ASSESSMENT  Gisell presents to occupational therapy evaluation with primary c/o pain and stiffness s/p removal of CRPP d/t Right small finger proximal phalanx fracture. The results of the objective tests and measures show deficits in edema control, and digital PROM and AROM, and wrist AROM impacting functional independence.  Functional deficits include but are not limited to difficulty with opening items, holding mugs, numbness with gripping steering wheel, writing, holding utensils, cutting food, and don/doffing clothes.  Signs and symptoms are consistent with diagnosis of Orthopedic aftercare. Pt and OT discussed evaluation findings, pathology, POC and HEP.  Pt voiced understanding and performs HEP correctly without reported pain. Skilled Occupational Therapy is medically necessary to address the above impairments and reach functional goals.     OBJECTIVE    OBSERVATION: Right small finger ulnarly deviated at the MCP joint, Edema present with the R SF and MCP joints    ORTHOTICS: ulnar gutter orthosis    SCAR:  mildly adhesive scar tissue over pin sites      SENSORY: Numbness: Yes, intermittent, mostly when driving    EDEMA:  RIGHT HAND:    SF   P1 5.8   PIP    P2 4.3   DIP    P3      LEFT HAND:    SF   P1 4.7   PIP    P2 4.2   DIP    P3        CIRCUMFERENTIAL EDEMA (cm):  Right MCP: 19.3  Left MCP: 18.5    ROM: (* denotes performed with pain)  Shoulder  Elbow Wrist   WNL WNL Flexion:  L 60, R 50  Extension: L 55, R 50  Ulnar Deviation: L 25, R 20  Radial Deviation L 20, R 10     AROM/PROM:(Degrees)  RIGHT HAND:    SF PROM SF AROM RF AROM   MP 0/45 0/35 0/30   PIP 0/35 -24/25 0/70   DIP 0/25 0/0 0/15   BERGERON 105 36 115     LEFT HAND AROM:    SF RF   MP 0/81 0/65   PIP 0/89 0/85   DIP 0/75 0/68   BERGERON 245 218       Strength (lbs) Right Average Left Average   : NT NT   2 pt Pinch: NT NT   3 pt Pinch: NT NT   Lateral Pinch: NT NT         Today’s Treatment and Response:   Pt education was provided on exam findings, treatment diagnosis, treatment plan, expectations, and prognosis. Pt was also provided recommendations for continuation of orthosis per doctor's orders   Patient was instructed in and issued a HEP for: tendon glides, flexion joint blocking, and digital adduction  Orthosis modifications d/t discomfort verbalized over ulnar styloid with irritation observed. Orthosis expanded with padding placed    Charges: OT Eval: Moderate Complexity, TE 1, Orthofit 1      Total Timed Treatment: 24 min     Total Treatment Time: 45 min     Based on clinical rationale and outcome measures, this evaluation involved Moderate Complexity decision making due to 3+ personal factors/comorbidities, 4+ body structures involved/activity limitations, and evolving symptoms including changing pain levels and decline in functional independence specifically composite flexion.  PLAN OF CARE   Goals: (to be met in 12 visits)  Pt will be independent and compliant with comprehensive HEP to maintain progress achieved in OT  Pt will decrease their (R) P1 edema by 0.5 cm or more for ease of grasping utensils.  Pt will decrease their (R) MCP edema by 0.5 cm or more for ease of holding steering wheel  Pt will increase their (R) wrist flexion and extension by 10 degrees for ease of dressing  Pt will increase their (R) RF BERGERON to at least 200 degrees for ease of holding cup.  Pt will increase their (R) SF BERGERON to at least 200  degrees  for ease of cupping medications.    Pt will decrease their QuickDASH score by 10% in order to demo improvements in functional independence.     Frequency / Duration: Patient will be seen for 2x/week or a total of 12 visits over a 90 day period.  Treatment will include: Manual Therapy, Soft tissue mobilization, AROM, PROM, Strengthening, Therapeutic Activity, Moist heat, cryotherapy, Ultrasound, Whirlpool (fluidotherapy), Paraffin, Electrical Stim, Orthosis,  Neuromuscular Re-education, Patient education, Home exercise program,        Education or treatment limitation: None  Rehab Potential:excellent    QuickDASH Outcome Score  Score: (Patient-Rptd) 36.36 % (11/1/2024  5:45 AM)      Patient/Family/Caregiver was advised of these findings, precautions, and treatment options and has agreed to actively participate in planning and for this course of care.    Thank you for your referral. Please co-sign or sign and return this letter via fax as soon as possible to [phone]. If you have any questions, please contact me at Dept: [phone]    Sincerely,  Electronically signed by therapist: Julissa Tidwell, OT  Physician's certification required: Yes  I certify the need for these services furnished under this plan of treatment and while under my care.    X___________________________________________________ Date____________________    Certification From: 11/5/2024  To:2/3/2025

## 2024-11-07 ENCOUNTER — OFFICE VISIT (OUTPATIENT)
Dept: OCCUPATIONAL MEDICINE | Facility: HOSPITAL | Age: 39
End: 2024-11-07
Attending: STUDENT IN AN ORGANIZED HEALTH CARE EDUCATION/TRAINING PROGRAM
Payer: COMMERCIAL

## 2024-11-07 PROCEDURE — 97110 THERAPEUTIC EXERCISES: CPT

## 2024-11-07 PROCEDURE — 97530 THERAPEUTIC ACTIVITIES: CPT

## 2024-11-07 PROCEDURE — 97140 MANUAL THERAPY 1/> REGIONS: CPT

## 2024-11-07 NOTE — PROGRESS NOTES
Diagnosis:   Orthopedic aftercare (Z47.89)       Referring Provider: Luis Mayfield  Date of Evaluation:    11/5/2024    Precautions:  following [place] Hand Protocols Next MD visit:   none scheduled  Date of Surgery: n/a   Insurance Primary/Secondary: BCBS OUT OF STATE / N/A     # Auth Visits: 5 visits 10/11-12/31             Subjective: Pt states modifications to orthosis has helped with comfort.    Pain: 0/10      Objective: See exercises flowsheet below for exercises and activities performed today. All exercises performed bilaterally.      Assessment: Pt's R SF remains stiff especially in intrinsic minus position. Minimal ability to flex at PIP on R SF right now. Pt better able to perform composite flexion post manual work.      Goals: (to be met in 12 visits, 5 authorized including splint visit)  Pt will be independent and compliant with comprehensive HEP to maintain progress achieved in OT  Pt will decrease their (R) P1 edema by 0.5 cm or more for ease of grasping utensils.  Pt will decrease their (R) MCP edema by 0.5 cm or more for ease of holding steering wheel  Pt will increase their (R) wrist flexion and extension by 10 degrees for ease of dressing  Pt will increase their (R) RF BERGERON to at least 200 degrees for ease of holding cup.  Pt will increase their (R) SF BERGERON to at least 200  degrees for ease of cupping medications.  Pt will decrease their QuickDASH score by 10% in order to demo improvements in functional independence.     Plan: Continue working on AROM and PROM of R RF and SF  Date 11/7/2024                  Visit # 2/12                                    Evaluation                 Manual                                PROM into composite flexion                 Ther ex                                  Digital adduction 10x2    Intrinsic plus position 10x2    Hook curl 10x2    Joint blocking MP, PIP, DIP                     HEP instruction                    Therapeutic Activity                                Adduction of RF and SF sponge lifts and transports (5 mins)    Composite flexion cone grasp and pull of marbles across the table (10 marbles)                 Neuromuscular Re-education                                                 Modalities                           Moist Heat applied to digit (3 mins)                    HEP:  Assignment date:   tendon glides, flexion joint blocking, and digital adduction 11/05/24                Charges: TA 1 (10), TE 2 (24), MT 1 (8)   Total Timed Treatment: 42 min  Total Treatment Time: 45 min

## 2024-11-11 ENCOUNTER — TELEPHONE (OUTPATIENT)
Dept: OCCUPATIONAL MEDICINE | Facility: HOSPITAL | Age: 39
End: 2024-11-11

## 2024-11-11 ENCOUNTER — APPOINTMENT (OUTPATIENT)
Dept: OCCUPATIONAL MEDICINE | Facility: HOSPITAL | Age: 39
End: 2024-11-11
Attending: STUDENT IN AN ORGANIZED HEALTH CARE EDUCATION/TRAINING PROGRAM
Payer: COMMERCIAL

## 2024-11-14 ENCOUNTER — APPOINTMENT (OUTPATIENT)
Dept: OCCUPATIONAL MEDICINE | Facility: HOSPITAL | Age: 39
End: 2024-11-14
Attending: STUDENT IN AN ORGANIZED HEALTH CARE EDUCATION/TRAINING PROGRAM
Payer: COMMERCIAL

## 2024-11-18 ENCOUNTER — OFFICE VISIT (OUTPATIENT)
Dept: OCCUPATIONAL MEDICINE | Facility: HOSPITAL | Age: 39
End: 2024-11-18
Attending: STUDENT IN AN ORGANIZED HEALTH CARE EDUCATION/TRAINING PROGRAM
Payer: COMMERCIAL

## 2024-11-18 ENCOUNTER — HOSPITAL ENCOUNTER (OUTPATIENT)
Dept: MAMMOGRAPHY | Facility: HOSPITAL | Age: 39
Discharge: HOME | End: 2024-11-18
Attending: INTERNAL MEDICINE
Payer: COMMERCIAL

## 2024-11-18 DIAGNOSIS — Z12.31 ENCOUNTER FOR SCREENING MAMMOGRAM FOR BREAST CANCER: ICD-10-CM

## 2024-11-18 PROCEDURE — 77067 SCR MAMMO BI INCL CAD: CPT | Performed by: INTERNAL MEDICINE

## 2024-11-18 PROCEDURE — 77063 BREAST TOMOSYNTHESIS BI: CPT | Performed by: INTERNAL MEDICINE

## 2024-11-18 PROCEDURE — 97110 THERAPEUTIC EXERCISES: CPT

## 2024-11-18 NOTE — PROGRESS NOTES
Diagnosis:   Orthopedic aftercare (Z47.89)       Referring Provider: Luis Mayfield  Date of Evaluation:    11/5/2024    Precautions:  following [place] Hand Protocols Next MD visit:   none scheduled  Date of Surgery: 09/16/24   Insurance Primary/Secondary: BCBS OUT OF STATE / N/A     # Auth Visits: 5 visits 10/11-12/31             Subjective: Pt states numbness/pressuring feeling at the base of her thumb radiating to her 2-3rd digits.    Pain: 0/10      Objective: See exercises flowsheet below for exercises and activities performed today. All exercises performed bilaterally.      Assessment: Pt's R SF remains stiff especially in intrinsic minus position. S/s of possible irritation at the carpal tunnel present. Pt trained in median nerve glides which appeared to mildly alleviate symptoms.        Goals: (to be met in 12 visits, 5 authorized including splint visit)  Pt will be independent and compliant with comprehensive HEP to maintain progress achieved in OT  Pt will decrease their (R) P1 edema by 0.5 cm or more for ease of grasping utensils.  Pt will decrease their (R) MCP edema by 0.5 cm or more for ease of holding steering wheel  Pt will increase their (R) wrist flexion and extension by 10 degrees for ease of dressing  Pt will increase their (R) RF BERGERON to at least 200 degrees for ease of holding cup.  Pt will increase their (R) SF BERGERON to at least 200  degrees for ease of cupping medications.  Pt will decrease their QuickDASH score by 10% in order to demo improvements in functional independence.     Plan: Last authorized visit next visit, fabricate yolk orthosis for PIP flexion.  Date 11/7/2024 11/18/2024                Visit # 2/12  3/12                                  Evaluation                 Manual                                PROM into composite flexion PROM into composite flexion 10x1, 10-20 sec holds               Ther ex                                  Digital adduction 10x2    Intrinsic plus  position 10x2    Hook curl 10x2    Joint blocking MP, PIP, DIP     Fluidotherapy with digital and wrist AROM (7 mins)    Composite fist rolls 20x1    Median nerve glides 10x1    Joint blocking MP, PIP, DIP 10x2                 HEP instruction                    Therapeutic Activity                               Adduction of RF and SF sponge lifts and transports (5 mins)    Composite flexion cone grasp and pull of marbles across the table (10 marbles) Adduction of RF and SF large peg lifts and transports (5 mins)               Neuromuscular Re-education                                                 Modalities                           Moist Heat applied to digit (3 mins)                    HEP:  Assignment date:   tendon glides, flexion joint blocking, and digital adduction 11/05/24   Median nerve glides 11/18/24               Charges: TE 3  Total Timed Treatment: 43 min  Total Treatment Time: 43 min

## 2024-11-19 ENCOUNTER — APPOINTMENT (OUTPATIENT)
Dept: OCCUPATIONAL MEDICINE | Facility: HOSPITAL | Age: 39
End: 2024-11-19
Attending: STUDENT IN AN ORGANIZED HEALTH CARE EDUCATION/TRAINING PROGRAM
Payer: COMMERCIAL

## 2024-11-20 ENCOUNTER — APPOINTMENT (OUTPATIENT)
Dept: OCCUPATIONAL MEDICINE | Facility: HOSPITAL | Age: 39
End: 2024-11-20
Attending: STUDENT IN AN ORGANIZED HEALTH CARE EDUCATION/TRAINING PROGRAM
Payer: COMMERCIAL

## 2024-11-21 ENCOUNTER — OFFICE VISIT (OUTPATIENT)
Dept: OCCUPATIONAL MEDICINE | Facility: HOSPITAL | Age: 39
End: 2024-11-21
Attending: STUDENT IN AN ORGANIZED HEALTH CARE EDUCATION/TRAINING PROGRAM
Payer: COMMERCIAL

## 2024-11-21 PROCEDURE — 97110 THERAPEUTIC EXERCISES: CPT

## 2024-11-21 NOTE — PROGRESS NOTES
Diagnosis:   Orthopedic aftercare (Z47.89)       Referring Provider: Luis Mayfield  Date of Evaluation:    11/5/2024    Precautions:  following [place] Hand Protocols Next MD visit:   none scheduled  Date of Surgery: 09/25/24   Insurance Primary/Secondary: BCBS OUT OF STATE / N/A     # Auth Visits: 5 visits 10/11-12/31               Progress Summary  Pt has attended 4 visits in Occupational Therapy.     Subjective: Pt states having quite a bit of cramping within the R hand impacting her ability to perform exercises and functional activities. Pain: 6-7/10    Assessment: Pt making progress towards goals as evidenced by measurements taken below. Pt remains tight within her right small finger PIP joint. Relative motion orthosis fabricated today for pt to exercise in to promote PIP flexion. Strength measurements obtained as pt is 8 weeks post-op, deficits noted below. Goals updated to include strengthening. Pt would benefit from continued skilled OT to address deficits to return to PLOF.     Objective Data:     RIGHT HAND:    SF   P1 5.8   PIP    P2 4.3   DIP    P3      LEFT HAND:    SF Eval SF 11/21/2024   P1 4.7 4.4   PIP     P2 4.2 NT   DIP     P3         CIRCUMFERENTIAL EDEMA (cm):  Eval Right MCP: 19.3  11/21/2024 Right MCP: 19.0    ROM: (* denotes performed with pain)  Wrist eval Wrist 11/21/2024   Flexion: L 60, R 50  Extension: L 55, R 50  Ulnar Deviation: L 25, R 20  Radial Deviation L 20, R 10 Flexion: R 50  Extension: R 55     AROM (Degrees)  RIGHT HAND:    SF Eval  SF 11/21/2024 RF eval RF 11/21/2024   MP 0/35 0/60 0/30 0/50   PIP -24/25 -15/25 0/70 0/76   DIP 0/0 0/5 0/15 0/30   BERGERON 36 75 115 156         Strength (lbs) Right Average Left Average   : 9 34   2 pt Pinch: 5 5   3 pt Pinch: 5 10   Lateral Pinch: 5 16       Goals:   Pt will be independent and compliant with comprehensive HEP to maintain progress achieved in OT-MET, continued  Pt will decrease their (R) SF P1 edema by 0.5 cm or more for ease of  grasping utensils.-Progressing  Pt will decrease their (R) MCP edema by 0.5 cm or more for ease of holding steering wheel-Progressing  Pt will increase their (R) wrist flexion and extension by 10 degrees for ease of dressing-Progressing  Pt will increase their (R) RF BERGERON to at least 200 degrees for ease of holding cup.-Progressing  Pt will increase their (R) SF BERGERON to at least 200  degrees for ease of cupping medications.-Progressing  Pt will decrease their QuickDASH score by 10% in order to demo improvements in functional independence.-NT   Updated/Additional Goals:  Pt will increase their (R)  strength to at least 25# for ease of holding bow for hunting.  Pt will increase their (R) pinch strength scores by 3-5# for ease of opening bottles and jars.      Rehab Potential: good    Plan: Continue skilled Occupational Therapy 2x/week or a total of 6 visits over a 90 day period. Treatment will include: Manual Therapy, Soft tissue mobilization, AROM, PROM, Strengthening, Therapeutic Activity, Moist heat, cryotherapy, Ultrasound, Whirlpool (fluidotherapy), Paraffin, Electrical Stim, Orthosis,  Neuromuscular Re-education, Patient education, Home exercise program,           Patient/Family/Caregiver was advised of these findings, precautions, and treatment options and has agreed to actively participate in planning and for this course of care.    Thank you for your referral. If you have any questions, please contact me at Dept: [phone].    Sincerely,  Electronically signed by therapist: Julissa Tidwell OT    Physician's certification required: Yes  Please co-sign or sign and return this letter via fax as soon as possible to [phone].   I certify the need for these services furnished under this plan of treatment and while under my care.    X___________________________________________________ Date____________________    Certification From: 11/21/2024  To:2/19/2025            Treatment Objective: See exercises  flowsheet below for exercises and activities performed today. All exercises performed bilaterally.      Date 11/7/2024 11/18/2024 11/21/2024             Visit # 2/12  3/12  4                                Evaluation                 Manual                                PROM into composite flexion PROM into composite flexion 10x1, 10-20 sec holds PROM into composite flexion 10x1, 10-20 sec holds             Ther ex                                  Digital adduction 10x2    Intrinsic plus position 10x2    Hook curl 10x2    Joint blocking MP, PIP, DIP     Fluidotherapy with digital and wrist AROM (7 mins)    Composite fist rolls 20x1    Median nerve glides 10x1    Joint blocking MP, PIP, DIP 10x2   Fluidotherapy with digital and wrist AROM (7 mins)    Composite fist rolls 20x1    Digital adduction 20x1    Joint blocking MP, PIP, DIP 10x2    Fabrication of PIP flexion relative motion orthosis (5 mins)             HEP instruction                    Therapeutic Activity                               Adduction of RF and SF sponge lifts and transports (5 mins)    Composite flexion cone grasp and pull of marbles across the table (10 marbles) Adduction of RF and SF large peg lifts and transports (5 mins)               Neuromuscular Re-education                                                 Modalities                           Moist Heat applied to digit (3 mins)                    HEP:  Assignment date:   tendon glides, flexion joint blocking, and digital adduction 11/05/24   Median nerve glides 11/18/24               Charges: TE 3  Total Timed Treatment: 38 min  Total Treatment Time: 42 min

## 2024-11-25 ENCOUNTER — OFFICE VISIT (OUTPATIENT)
Dept: OCCUPATIONAL MEDICINE | Facility: HOSPITAL | Age: 39
End: 2024-11-25
Attending: STUDENT IN AN ORGANIZED HEALTH CARE EDUCATION/TRAINING PROGRAM
Payer: COMMERCIAL

## 2024-11-25 PROCEDURE — 97110 THERAPEUTIC EXERCISES: CPT

## 2024-11-25 NOTE — PROGRESS NOTES
Diagnosis:   Orthopedic aftercare (Z47.89)       Referring Provider: Luis Mayfield  Date of Evaluation:    11/5/2024    Precautions:  following [place] Hand Protocols Next MD visit:   none scheduled  Date of Surgery: 09/25/24   Insurance Primary/Secondary: BCBS OUT OF STATE / N/A     # Auth Visits: 5 visits 10/11-12/31             Subjective: Pt states continued cramping with exercises. Pt also verbalized compliance with orthosis exercises.    Pain: 0/10      Objective: See exercises flowsheet below for exercises and activities performed today. All exercises performed bilaterally.      Assessment: Pt arrived with relative motion orthosis needing a modification as the loop part came apart. OT modified orthosis and pt verbalized comfort. Pt's R RF PIP passive flexion more mobile than last session during manual work.       Goals: 2x/week or a total of 10 visits   Pt will be independent and compliant with comprehensive HEP to maintain progress achieved in OT-MET, continued  Pt will decrease their (R) SF P1 edema by 0.5 cm or more for ease of grasping utensils.-Progressing  Pt will decrease their (R) MCP edema by 0.5 cm or more for ease of holding steering wheel-Progressing  Pt will increase their (R) wrist flexion and extension by 10 degrees for ease of dressing-Progressing  Pt will increase their (R) RF BERGERON to at least 200 degrees for ease of holding cup.-Progressing  Pt will increase their (R) SF EBRGERON to at least 200  degrees for ease of cupping medications.-Progressing  Pt will decrease their QuickDASH score by 10% in order to demo improvements in functional independence.-NT   Updated/Additional Goals:  Pt will increase their (R)  strength to at least 25# for ease of holding bow for hunting.  Pt will increase their (R) pinch strength scores by 3-5# for ease of opening bottles and jars.    Plan: Continue AROM and light strengthening  Date 11/7/2024 11/18/2024 11/21/2024 11/25/2024            Visit # 2  3  4  5                               Evaluation                 Manual                                PROM into composite flexion PROM into composite flexion 10x1, 10-20 sec holds PROM into composite flexion 10x1, 10-20 sec holds PROM into composite flexion 10x1, 10-20 sec holds           Ther ex                                  Digital adduction 10x2    Intrinsic plus position 10x2    Hook curl 10x2    Joint blocking MP, PIP, DIP     Fluidotherapy with digital and wrist AROM (7 mins)    Composite fist rolls 20x1    Median nerve glides 10x1    Joint blocking MP, PIP, DIP 10x2   Fluidotherapy with digital and wrist AROM (7 mins)    Composite fist rolls 20x1    Digital adduction 20x1    Joint blocking MP, PIP, DIP 10x2    Fabrication of PIP flexion relative motion orthosis (5 mins) Fluidotherapy with digital and wrist AROM (7 mins)    Composite fist rolls 20x1    Digital adduction 20x1    Yellow putty ex:   5x3  -FDP glides  -lumbrical squeeze  -opposition  -adduction           HEP instruction                    Therapeutic Activity                               Adduction of RF and SF sponge lifts and transports (5 mins)    Composite flexion cone grasp and pull of marbles across the table (10 marbles) Adduction of RF and SF large peg lifts and transports (5 mins)               Neuromuscular Re-education                                                 Modalities                           Moist Heat applied to digit (3 mins)                    HEP:  Assignment date:   tendon glides, flexion joint blocking, and digital adduction 11/05/24   Median nerve glides 11/18/24               Charges: TE 3  Total Timed Treatment: 41 min  Total Treatment Time: 41 min     No assistance needed

## 2024-11-27 ENCOUNTER — APPOINTMENT (OUTPATIENT)
Dept: OCCUPATIONAL MEDICINE | Facility: HOSPITAL | Age: 39
End: 2024-11-27
Attending: STUDENT IN AN ORGANIZED HEALTH CARE EDUCATION/TRAINING PROGRAM
Payer: COMMERCIAL

## 2024-12-03 ENCOUNTER — OFFICE VISIT (OUTPATIENT)
Dept: OCCUPATIONAL MEDICINE | Facility: HOSPITAL | Age: 39
End: 2024-12-03
Attending: STUDENT IN AN ORGANIZED HEALTH CARE EDUCATION/TRAINING PROGRAM
Payer: COMMERCIAL

## 2024-12-03 PROCEDURE — 97110 THERAPEUTIC EXERCISES: CPT

## 2024-12-03 NOTE — PROGRESS NOTES
Diagnosis:   Orthopedic aftercare (Z47.89)       Referring Provider: Luis Mayfield  Date of Evaluation:    11/5/2024    Precautions:  following [place] Hand Protocols Next MD visit:   none scheduled  Date of Surgery: 09/25/24   Insurance Primary/Secondary: BCBS OUT OF STATE / N/A     # Auth Visits: 20 visits 10/11-12/31             Subjective: Pt states continued cramping within the palm on the radial side though.    Pain: 0/10      Objective: See exercises flowsheet below for exercises and activities performed today. All exercises performed bilaterally.      Assessment: Session began with a prolonged stretch using paraffin for tissue extensibility. Pt unable to tolerate deep stretch using coband so R SF lightly wrapped into flexion. Pt challenged with more resistant putty today.        Goals: 2x/week or a total of 10 visits   Pt will be independent and compliant with comprehensive HEP to maintain progress achieved in OT-MET, continued  Pt will decrease their (R) SF P1 edema by 0.5 cm or more for ease of grasping utensils.-Progressing  Pt will decrease their (R) MCP edema by 0.5 cm or more for ease of holding steering wheel-Progressing  Pt will increase their (R) wrist flexion and extension by 10 degrees for ease of dressing-Progressing  Pt will increase their (R) RF BERGERON to at least 200 degrees for ease of holding cup.-Progressing  Pt will increase their (R) SF BERGERON to at least 200  degrees for ease of cupping medications.-Progressing  Pt will decrease their QuickDASH score by 10% in order to demo improvements in functional independence.-NT   Updated/Additional Goals:  Pt will increase their (R)  strength to at least 25# for ease of holding bow for hunting.  Pt will increase their (R) pinch strength scores by 3-5# for ease of opening bottles and jars.    Plan: Continue AROM and light strengthening  Date 11/7/2024   11/18/2024   11/21/2024 11/25/2024   12/3/2024         Visit # 2  3  4  5  6                             Evaluation                 Manual                                PROM into composite flexion PROM into composite flexion 10x1, 10-20 sec holds PROM into composite flexion 10x1, 10-20 sec holds PROM into composite flexion 10x1, 10-20 sec holds           Ther ex                                  Digital adduction 10x2    Intrinsic plus position 10x2    Hook curl 10x2    Joint blocking MP, PIP, DIP     Fluidotherapy with digital and wrist AROM (7 mins)    Composite fist rolls 20x1    Median nerve glides 10x1    Joint blocking MP, PIP, DIP 10x2   Fluidotherapy with digital and wrist AROM (7 mins)    Composite fist rolls 20x1    Digital adduction 20x1    Joint blocking MP, PIP, DIP 10x2    Fabrication of PIP flexion relative motion orthosis (5 mins) Fluidotherapy with digital and wrist AROM (7 mins)    Composite fist rolls 20x1    Digital adduction 20x1    Yellow putty ex:   5x3  -FDP glides  -lumbrical squeeze  -opposition  -adduction Paraffin with prolonged stretch of the R SF into flexion using coband (5 mins)    Composite fist rolls 20x1    Joint blocking PIP 20x1    Red putty ex: 10x2  -  -lumbrical squeeze  -opposition  -adduction             HEP instruction                    Therapeutic Activity                               Adduction of RF and SF sponge lifts and transports (5 mins)    Composite flexion cone grasp and pull of marbles across the table (10 marbles) Adduction of RF and SF large peg lifts and transports (5 mins)               Neuromuscular Re-education                                                 Modalities                           Moist Heat applied to digit (3 mins)                    HEP:  Assignment date:   tendon glides, flexion joint blocking, and digital adduction 11/05/24   Median nerve glides 11/18/24               Charges: TE 3  Total Timed Treatment: 45 min  Total Treatment Time: 45 min

## 2024-12-05 ENCOUNTER — OFFICE VISIT (OUTPATIENT)
Dept: OCCUPATIONAL MEDICINE | Facility: HOSPITAL | Age: 39
End: 2024-12-05
Attending: STUDENT IN AN ORGANIZED HEALTH CARE EDUCATION/TRAINING PROGRAM
Payer: COMMERCIAL

## 2024-12-05 PROCEDURE — 97110 THERAPEUTIC EXERCISES: CPT

## 2024-12-05 PROCEDURE — 97530 THERAPEUTIC ACTIVITIES: CPT

## 2024-12-05 NOTE — PROGRESS NOTES
Diagnosis:   Orthopedic aftercare (Z47.89)       Referring Provider: Luis Mayfield  Date of Evaluation:    11/5/2024    Precautions:  following [place] Hand Protocols Next MD visit:   none scheduled  Date of Surgery: 09/25/24   Insurance Primary/Secondary: BCBS OUT OF STATE / N/A     # Auth Visits: 20 visits 10/11-12/31             Subjective: Pt states soreness post last session.    Pain: 0/10      Objective: See exercises flowsheet below for exercises and activities performed today. All exercises performed bilaterally.    RIGHT HAND:    SF Eval  SF 11/21/2024 SF 12/5/2024    MP 0/35 0/60 0/65    PIP -24/25 -15/25 -20/25    DIP 0/0 0/5 0/5    BERGERON 36 75 75        Assessment: Session began with a prolonged stretch using paraffin for tissue extensibility. IASTM initiated to address any STR and scar tissue sticking. Pt appears to have minimal FDS and FDP gliding in the R SF.       Goals: 2x/week or a total of 10 visits   Pt will be independent and compliant with comprehensive HEP to maintain progress achieved in OT-MET, continued  Pt will decrease their (R) SF P1 edema by 0.5 cm or more for ease of grasping utensils.-Progressing  Pt will decrease their (R) MCP edema by 0.5 cm or more for ease of holding steering wheel-Progressing  Pt will increase their (R) wrist flexion and extension by 10 degrees for ease of dressing-Progressing  Pt will increase their (R) RF BERGERON to at least 200 degrees for ease of holding cup.-Progressing  Pt will increase their (R) SF BERGERON to at least 200  degrees for ease of cupping medications.-Progressing  Pt will decrease their QuickDASH score by 10% in order to demo improvements in functional independence.-NT   Updated/Additional Goals:  Pt will increase their (R)  strength to at least 25# for ease of holding bow for hunting.  Pt will increase their (R) pinch strength scores by 3-5# for ease of opening bottles and jars.    Plan: Add strengthening exercises to HEP  Date 11/7/2024    11/18/2024   11/21/2024 11/25/2024   12/3/2024  12/5/2024        Visit # 2  3  4  5  6  7                          Evaluation                 Manual                                PROM into composite flexion PROM into composite flexion 10x1, 10-20 sec holds PROM into composite flexion 10x1, 10-20 sec holds PROM into composite flexion 10x1, 10-20 sec holds    IASTM to volar palm and R SF (5 mins)       Ther ex                                  Digital adduction 10x2    Intrinsic plus position 10x2    Hook curl 10x2    Joint blocking MP, PIP, DIP     Fluidotherapy with digital and wrist AROM (7 mins)    Composite fist rolls 20x1    Median nerve glides 10x1    Joint blocking MP, PIP, DIP 10x2   Fluidotherapy with digital and wrist AROM (7 mins)    Composite fist rolls 20x1    Digital adduction 20x1    Joint blocking MP, PIP, DIP 10x2    Fabrication of PIP flexion relative motion orthosis (5 mins) Fluidotherapy with digital and wrist AROM (7 mins)    Composite fist rolls 20x1    Digital adduction 20x1    Yellow putty ex:   5x3  -FDP glides  -lumbrical squeeze  -opposition  -adduction Paraffin with prolonged stretch of the R SF into flexion using coband (5 mins)    Composite fist rolls 20x1    Joint blocking PIP 20x1    Red putty ex: 10x2  -  -lumbrical squeeze  -opposition  -adduction     Paraffin with prolonged stretch of the R SF into flexion using coband (5 mins)    Composite flexion 20x2    Blue Web ex  -  -lumbrical squeeze  10x2    Yellow Flexbar 6# 10x2  -bilateral supination               HEP instruction                    Therapeutic Activity                               Adduction of RF and SF sponge lifts and transports (5 mins)    Composite flexion cone grasp and pull of marbles across the table (10 marbles) Adduction of RF and SF large peg lifts and transports (5 mins)       Composite flexion cone grasp and stack (9 cones) 3 times       Neuromuscular Re-education                                                  Modalities                           Moist Heat applied to digit (3 mins)                    HEP:  Assignment date:   tendon glides, flexion joint blocking, and digital adduction 11/05/24   Median nerve glides 11/18/24               Charges: TA 1 (8), TE 2 (32) Total Timed Treatment: 40 min  Total Treatment Time: 40 min

## 2024-12-10 ENCOUNTER — APPOINTMENT (OUTPATIENT)
Dept: OCCUPATIONAL MEDICINE | Facility: HOSPITAL | Age: 39
End: 2024-12-10
Attending: STUDENT IN AN ORGANIZED HEALTH CARE EDUCATION/TRAINING PROGRAM
Payer: COMMERCIAL

## 2024-12-10 ENCOUNTER — TELEPHONE (OUTPATIENT)
Dept: PHYSICAL THERAPY | Facility: HOSPITAL | Age: 39
End: 2024-12-10

## 2024-12-12 ENCOUNTER — OFFICE VISIT (OUTPATIENT)
Dept: OCCUPATIONAL MEDICINE | Facility: HOSPITAL | Age: 39
End: 2024-12-12
Attending: STUDENT IN AN ORGANIZED HEALTH CARE EDUCATION/TRAINING PROGRAM
Payer: COMMERCIAL

## 2024-12-12 PROCEDURE — 97110 THERAPEUTIC EXERCISES: CPT

## 2024-12-12 NOTE — PROGRESS NOTES
Diagnosis:   Orthopedic aftercare (Z47.89)       Referring Provider: Luis Mayfield  Date of Evaluation:    11/5/2024    Precautions:  following [place] Hand Protocols Next MD visit:   none scheduled  Date of Surgery: 09/25/24   Insurance Primary/Secondary: BCBS OUT OF STATE / N/A     # Auth Visits: 20 visits 10/11-12/31             Subjective: Pt states soreness post last session.    Pain: 0/10      Objective: See exercises flowsheet below for exercises and activities performed today. All exercises performed bilaterally.    RIGHT HAND:    SF Eval  SF 11/21/2024 SF 12/5/2024    MP 0/35 0/60 0/65    PIP -24/25 -15/25 -20/25    DIP 0/0 0/5 0/5    BERGERON 36 75 75        Assessment: Orthosis modified to increase slack in the extensor muscles of the R SF for pt to glide FDS and FDP. Pt continues to have min flexion at both the PIP and DIP of the R SF.      Goals: 2x/week or a total of 10 visits   Pt will be independent and compliant with comprehensive HEP to maintain progress achieved in OT-MET, continued  Pt will decrease their (R) SF P1 edema by 0.5 cm or more for ease of grasping utensils.-Progressing  Pt will decrease their (R) MCP edema by 0.5 cm or more for ease of holding steering wheel-Progressing  Pt will increase their (R) wrist flexion and extension by 10 degrees for ease of dressing-Progressing  Pt will increase their (R) RF BERGERON to at least 200 degrees for ease of holding cup.-Progressing  Pt will increase their (R) SF BERGERON to at least 200  degrees for ease of cupping medications.-Progressing  Pt will decrease their QuickDASH score by 10% in order to demo improvements in functional independence.-NT   Updated/Additional Goals:  Pt will increase their (R)  strength to at least 25# for ease of holding bow for hunting.  Pt will increase their (R) pinch strength scores by 3-5# for ease of opening bottles and jars.    Plan: Add strengthening exercises to HEP next session  Date 11/7/2024 11/18/2024 11/21/2024  11/25/2024   12/3/2024  12/5/2024  12/12/2024      Visit # 2  3  4  5  6  7  8                        Evaluation                 Manual                                PROM into composite flexion PROM into composite flexion 10x1, 10-20 sec holds PROM into composite flexion 10x1, 10-20 sec holds PROM into composite flexion 10x1, 10-20 sec holds    IASTM to volar palm and R SF (5 mins)       Ther ex                                  Digital adduction 10x2    Intrinsic plus position 10x2    Hook curl 10x2    Joint blocking MP, PIP, DIP     Fluidotherapy with digital and wrist AROM (7 mins)    Composite fist rolls 20x1    Median nerve glides 10x1    Joint blocking MP, PIP, DIP 10x2   Fluidotherapy with digital and wrist AROM (7 mins)    Composite fist rolls 20x1    Digital adduction 20x1    Joint blocking MP, PIP, DIP 10x2    Fabrication of PIP flexion relative motion orthosis (5 mins) Fluidotherapy with digital and wrist AROM (7 mins)    Composite fist rolls 20x1    Digital adduction 20x1    Yellow putty ex:   5x3  -FDP glides  -lumbrical squeeze  -opposition  -adduction Paraffin with prolonged stretch of the R SF into flexion using coband (5 mins)    Composite fist rolls 20x1    Joint blocking PIP 20x1    Red putty ex: 10x2  -  -lumbrical squeeze  -opposition  -adduction     Paraffin with prolonged stretch of the R SF into flexion using coband (5 mins)    Composite flexion 20x2    Blue Web ex  -  -lumbrical squeeze  10x2    Yellow Flexbar 6# 10x2  -bilateral supination         Paraffin with prolonged stretch of the R SF into flexion using coband (5 mins)    Composite flexion 20x2    PIP flexion with orthosis 20x1    [place] cupping into thumb<>palm translation (8 mins)             HEP instruction                    Therapeutic Activity                               Adduction of RF and SF sponge lifts and transports (5 mins)    Composite flexion cone grasp and pull of marbles across the table (10 marbles)  Adduction of RF and SF large peg lifts and transports (5 mins)       Composite flexion cone grasp and stack (9 cones) 3 times       Neuromuscular Re-education                                                 Modalities                           Moist Heat applied to digit (3 mins)                    HEP:  Assignment date:   tendon glides, flexion joint blocking, and digital adduction 11/05/24   Median nerve glides 11/18/24               Charges: TE 3   Total Timed Treatment: 40 min  Total Treatment Time: 40 min

## 2024-12-16 ENCOUNTER — TELEPHONE (OUTPATIENT)
Dept: PODIATRY CLINIC | Facility: CLINIC | Age: 39
End: 2024-12-16

## 2024-12-16 DIAGNOSIS — Z47.89 ORTHOPEDIC AFTERCARE: Primary | ICD-10-CM

## 2024-12-17 ENCOUNTER — HOSPITAL ENCOUNTER (OUTPATIENT)
Dept: GENERAL RADIOLOGY | Age: 39
Discharge: HOME | End: 2024-12-17
Attending: STUDENT IN AN ORGANIZED HEALTH CARE EDUCATION/TRAINING PROGRAM
Payer: COMMERCIAL

## 2024-12-17 ENCOUNTER — OFFICE VISIT (OUTPATIENT)
Dept: ORTHOPEDICS CLINIC | Facility: CLINIC | Age: 39
End: 2024-12-17
Payer: COMMERCIAL

## 2024-12-17 ENCOUNTER — OFFICE VISIT (OUTPATIENT)
Dept: OCCUPATIONAL MEDICINE | Facility: HOSPITAL | Age: 39
End: 2024-12-17
Attending: STUDENT IN AN ORGANIZED HEALTH CARE EDUCATION/TRAINING PROGRAM
Payer: COMMERCIAL

## 2024-12-17 DIAGNOSIS — Z47.89 ORTHOPEDIC AFTERCARE: Primary | ICD-10-CM

## 2024-12-17 DIAGNOSIS — Z47.89 ORTHOPEDIC AFTERCARE: ICD-10-CM

## 2024-12-17 PROCEDURE — 73130 X-RAY EXAM OF HAND: CPT | Performed by: STUDENT IN AN ORGANIZED HEALTH CARE EDUCATION/TRAINING PROGRAM

## 2024-12-17 PROCEDURE — 99024 POSTOP FOLLOW-UP VISIT: CPT | Performed by: STUDENT IN AN ORGANIZED HEALTH CARE EDUCATION/TRAINING PROGRAM

## 2024-12-17 PROCEDURE — 97110 THERAPEUTIC EXERCISES: CPT

## 2024-12-17 NOTE — PROGRESS NOTES
Diagnosis:   Orthopedic aftercare (Z47.89)       Referring Provider: Luis Mayfield  Date of Evaluation:    11/5/2024    Precautions:  following [place] Hand Protocols Next MD visit:   none scheduled  Date of Surgery: 09/25/24   Insurance Primary/Secondary: BCBS OUT OF STATE / N/A     # Auth Visits: 20 visits 10/11-12/31             Subjective: Pt states seeing the doctor. Per pt the bone is fully healed. Pt may be getting a EMG later on if numbness does not subside.     Pain: 0/10      Objective: See exercises flowsheet below for exercises and activities performed today. All exercises performed bilaterally.    RIGHT HAND:    SF Eval  SF 11/21/2024 SF 12/5/2024    MP 0/35 0/60 0/65    PIP -24/25 -15/25 -20/25    DIP 0/0 0/5 0/5    BERGERON 36 75 75        Assessment: Pt continues to be restricted in PIP flexion, however, passively able to be ranged at a further range. Pt appears to have both muscular and joint restrictions at this time.       Goals: 2x/week or a total of 10 visits   Pt will be independent and compliant with comprehensive HEP to maintain progress achieved in OT-MET, continued  Pt will decrease their (R) SF P1 edema by 0.5 cm or more for ease of grasping utensils.-Progressing  Pt will decrease their (R) MCP edema by 0.5 cm or more for ease of holding steering wheel-Progressing  Pt will increase their (R) wrist flexion and extension by 10 degrees for ease of dressing-Progressing  Pt will increase their (R) RF BERGERON to at least 200 degrees for ease of holding cup.-Progressing  Pt will increase their (R) SF BERGERON to at least 200  degrees for ease of cupping medications.-Progressing  Pt will decrease their QuickDASH score by 10% in order to demo improvements in functional independence.-NT   Updated/Additional Goals:  Pt will increase their (R)  strength to at least 25# for ease of holding bow for hunting.  Pt will increase their (R) pinch strength scores by 3-5# for ease of opening bottles and jars.    Plan:  Add putty exercises to HEP  Date 11/7/2024   11/18/2024   11/21/2024 11/25/2024   12/3/2024  12/5/2024  12/12/2024  12/17/2024    Visit # 2  3  4  5  6  7  8 9                       Evaluation                 Manual                                PROM into composite flexion PROM into composite flexion 10x1, 10-20 sec holds PROM into composite flexion 10x1, 10-20 sec holds PROM into composite flexion 10x1, 10-20 sec holds    IASTM to volar palm and R SF (5 mins)   Tuning fork vibration scar massage (5 mins)   Ther ex                                  Digital adduction 10x2    Intrinsic plus position 10x2    Hook curl 10x2    Joint blocking MP, PIP, DIP     Fluidotherapy with digital and wrist AROM (7 mins)    Composite fist rolls 20x1    Median nerve glides 10x1    Joint blocking MP, PIP, DIP 10x2   Fluidotherapy with digital and wrist AROM (7 mins)    Composite fist rolls 20x1    Digital adduction 20x1    Joint blocking MP, PIP, DIP 10x2    Fabrication of PIP flexion relative motion orthosis (5 mins) Fluidotherapy with digital and wrist AROM (7 mins)    Composite fist rolls 20x1    Digital adduction 20x1    Yellow putty ex:   5x3  -FDP glides  -lumbrical squeeze  -opposition  -adduction Paraffin with prolonged stretch of the R SF into flexion using coband (5 mins)    Composite fist rolls 20x1    Joint blocking PIP 20x1    Red putty ex: 10x2  -  -lumbrical squeeze  -opposition  -adduction     Paraffin with prolonged stretch of the R SF into flexion using coband (5 mins)    Composite flexion 20x2    Blue Web ex  -  -lumbrical squeeze  10x2    Yellow Flexbar 6# 10x2  -bilateral supination         Paraffin with prolonged stretch of the R SF into flexion using coband (5 mins)    Composite flexion 20x2    PIP flexion with orthosis 20x1    [place] cupping into thumb<>palm translation (8 mins)         Paraffin with prolonged stretch of the R SF into flexion using coband (5 mins)    Composite flexion 20x2    Table top  10x2    Hook curl 10x2    Full arm median nerve glides 10x1    Red Putty Ex:  -FDS/FDP glides 10x1  -tip pinch SF and thumb 10x2       HEP instruction                    Therapeutic Activity                               Adduction of RF and SF sponge lifts and transports (5 mins)    Composite flexion cone grasp and pull of marbles across the table (10 marbles) Adduction of RF and SF large peg lifts and transports (5 mins)       Composite flexion cone grasp and stack (9 cones) 3 times       Neuromuscular Re-education                                                 Modalities                           Moist Heat applied to digit (3 mins)                    HEP:  Assignment date:   tendon glides, flexion joint blocking, and digital adduction 11/05/24   Median nerve glides 11/18/24               Charges: TE 3    Total Timed Treatment: 40 min  Total Treatment Time: 40 min

## 2024-12-17 NOTE — PROGRESS NOTES
Orthopaedic Surgery Postop Patient Visit  _______________________________________________________________________________________________________________  _______________________________________________________________________________________________________________    DATE OF VISIT: 12/17/2024     DATE OF SURGERY: 9/25/2024     CHIEF COMPLAINT: Follow-up Right hand 5th P1 base fracture stabilization surgery with CRPP       Chief Complaint   Patient presents with    Post-Op     Right hand POV sx, 09/25/2024. Pain 4/10 and numbness.        HISTORY OF PRESENT ILLNESS: Gisell Zepeda is a 39 year old female who presents to the clinic for follow-up after Right hand CRPP. The patient  is working twice weekly  with therapy. The patient reports slight alteration in sensation in median nerve distribution.  This onset after pin removal.  Pain is well controlled.    MEDICATIONS  Reviewed   sulfamethoxazole-trimethoprim -160 MG Oral Tab per tablet Take 1 tablet by mouth 2 (two) times daily. 20 tablet 0    atorvastatin 10 MG Oral Tab Take 1 tablet (10 mg total) by mouth nightly. 90 tablet 3    metFORMIN 500 MG Oral Tab Take 1.5 tabs before breakfast and 1.5 tabs before dinner 270 tablet 0    ondansetron (ZOFRAN) 4 mg tablet Take 1 tablet (4 mg total) by mouth every 8 (eight) hours as needed for Nausea. 10 tablet 0    Acetaminophen 500 MG Oral Cap Take 2 capsules (1,000 mg total) by mouth every 8 (eight) hours as needed for Pain. Never exceed 3000 mg in a 24-hour period.  Many over-the-counter medications also have acetaminophen in them. 180 capsule 0    senna-docusate 8.6-50 MG Oral Tab Take 2 tablets by mouth at bedtime. 28 tablet 0    Vitamin C 500 MG Oral Tab Take 1 tablet (500 mg total) by mouth in the morning and 1 tablet (500 mg total) before bedtime. 84 tablet 0    gabapentin 300 MG Oral Cap Take 1 capsule (300 mg total) by mouth every 8 (eight) hours. 30 capsule 0    Phentermine HCl 15 MG Oral Cap Take 1 capsule (15  mg total) by mouth every morning. 30 capsule 5    Norgestim-Eth Estrad Triphasic (ORTHO TRI-CYCLEN LO) 0.18/0.215/0.25 MG-25 MCG Oral Tab Take 1 tablet by mouth daily. (Patient taking differently: Take 1 tablet by mouth every evening.) 84 tablet 3    Glucose Blood (ONETOUCH ULTRA) In Vitro Strip CHECK BLOOD SUGAR TWICE DAILY 200 strip 3    fluconazole 200 MG Oral Tab Take 4 tablets (800 mg total) by mouth daily. 28 tablet 0    Lancets Does not apply Misc Check blood sugar twice daily. 50 each 2    Blood Glucose Monitoring Suppl (ONETOUCH ULTRA 2) w/Device Does not apply Kit Check blood sugar 2 times daily 1 kit 0    Continuous Blood Gluc  (DEXCOM G6 ) Does not apply Device 1 each by Does not apply route See Admin Instructions. 1 Device 0    Continuous Blood Gluc Sensor (DEXCOM G6 SENSOR) Does not apply Misc 1 each by Does not apply route See Admin Instructions. 9 each 3    Continuous Blood Gluc Transmit (DEXCOM G6 TRANSMITTER) Does not apply Misc 1 each by Does not apply route See Admin Instructions. 3 each 3    Multiple Vitamin (MULTIVITAMINS OR) Take by mouth. Bariatric vitamins           ALLERGIES  Allergies[1]     REVIEW OF SYSTEMS  A 14 point review of systems was performed. Pertinent positives and negatives noted in the HPI.    PHYSICAL EXAM  LMP 10/31/2024 (Approximate)      Constitutional: The patient is well-developed, well-nourished, in no acute distress.  Neurological: Alert and oriented to person, place, and time.  Psychiatric: Mood and affect normal.  Head: Normocephalic and atraumatic.  Cardiovascular: regular rate by palpation  Pulmonary/Chest: Effort normal. No respiratory distress. Breathing non-labored  Abdominal: Abdomen exhibits no distension.   Right hand  Pin sites well healed  ROM:  Fingers: Diminished.    Small finger motion 10-70 at MCP joint with diminished motion at the PIP joint as well  Motor/Strength:  Motor intact AIN/PIN/U distributions   strength: Diminished  SILT  M/U/R distributions  2+ radial pulse, brisk capillary refill to all fingers    IMAGING  I independently viewed and interpreted the imaging. Radiologist interpretation is available in the imaging report.  X-ray: Plain films of the right hand obtained in clinic today demonstrate well-healed fracture of the fifth proximal phalanx    ASSESSMENT/PLAN: Gisell Zepeda is a 39 year old female who presents to the Orthopaedic surgery clinic today for follow-up 12 weeks after Right hand fracture stabilization surgery.  She is overall healing appropriately but does have substantial amount of stiffness in the small finger.  While this is likely to continue to improve with therapy, we will closely monitor and determine if any additional intervention is necessary at the end of her next series of therapy sessions    We discussed the relevant radiographs at today's visit  We reviewed the PT protocol and adjustments were made as needed  WEIGHT BEARING STATUS: Weightbearing as tolerated  RANGE-OF-MOTION LIMITATIONS: as tolerated  NEW PRESCRIPTIONS: none  IMAGING ORDERED: none  CONSULTS PLACED: none  PROSTHESES/ORTHOTICS: none    FOLLOW-UP: 4-5 weeks    RADIOGRAPHS AT NEXT VISIT: none    I have personally seen Gisell Zepeda and discussed in detail their plan of care. Prior to departure, they indicated agreement with and understanding of their plan of care and their follow-up as documented herein this note. Please note that this note was written in combination with voice recognition/dictation software and there is a possibility of transcription errors which were not identified at the time of note submission. If clarification is necessary, please contact the author or clinic staff.    Luis Mayfield MD  Orthopaedic Surgery  12/17/2024            [1] No Known Allergies

## 2024-12-19 ENCOUNTER — APPOINTMENT (OUTPATIENT)
Dept: OCCUPATIONAL MEDICINE | Facility: HOSPITAL | Age: 39
End: 2024-12-19
Attending: STUDENT IN AN ORGANIZED HEALTH CARE EDUCATION/TRAINING PROGRAM
Payer: COMMERCIAL

## 2024-12-23 ENCOUNTER — OFFICE VISIT (OUTPATIENT)
Dept: OCCUPATIONAL MEDICINE | Facility: HOSPITAL | Age: 39
End: 2024-12-23
Attending: STUDENT IN AN ORGANIZED HEALTH CARE EDUCATION/TRAINING PROGRAM
Payer: COMMERCIAL

## 2024-12-23 PROCEDURE — 97110 THERAPEUTIC EXERCISES: CPT

## 2024-12-23 NOTE — PROGRESS NOTES
Diagnosis:   Orthopedic aftercare (Z47.89)       Referring Provider: Luis Mayfield  Date of Evaluation:    11/5/2024    Precautions:  following [place] Hand Protocols Next MD visit:   none scheduled  Date of Surgery: 09/25/24   Insurance Primary/Secondary: BCBS OUT OF STATE / N/A     # Auth Visits: 20 visits 10/11-12/31               Progress Summary  Pt has attended 10 visits in Occupational Therapy.     Subjective: Pt states struggling with numbness and cramping pain impacting functioning.      Assessment: Pt has made slow gains throughout OT treatment, as seen below. Pt continues to have difficulty obtaining flexion at the PIP and DIP joint of her R small finger. Edema has decreased with mobility. Today, pt has met 4/9 goals and progressed in the others. Home exercise program upgraded to include strengthening, pt demo'd independence. Pt would benefit from continued skilled OT to regain more functional grasp and return to PLOF. OT requesting 8 additional skilled visits.     Objective Data:     RIGHT   SF Eval  SF 11/21/2024 SF 12/5/2024 SF 12/23/2024    MP 0/35 0/60 0/65 0/65   PIP -24/25 -15/25 -20/25 -15/30   DIP 0/0 0/5 0/5 0/10   BERGERON 36 75 75 90     RIGHT HAND:    SF Eval SF 12/23/2024    P1 5.8 5.3   PIP     P2 4.3 4.3   DIP     P3         CIRCUMFERENTIAL EDEMA (cm):  Eval Right MCP: 19.3  11/21/2024 Right MCP: 19.0  12/23/2024 Right MCP:18.5      ROM: (* denotes performed with pain)  Wrist eval Wrist 11/21/2024 Wrist 12/23/2024    Flexion: L 60, R 50  Extension: L 55, R 50  Ulnar Deviation: L 25, R 20  Radial Deviation L 20, R 10 Flexion: R 50  Extension: R 55 Flexion: R 65  Extension: R 60     AROM (Degrees)  RIGHT HAND:    RF eval RF 11/21/2024 RF 12/23/2024    MP 0/30 0/50 0/65   PIP 0/70 0/76 0/85   DIP 0/15 0/30 0/40   BERGERON 115 156 190         Strength (lbs) Right Average 11/21/24 Right Average 12/23/2024   : 9 16   2 pt Pinch: 5 5   3 pt Pinch: 5 6   Lateral Pinch: 5 9 (+4)       Goals:   Pt will be  independent and compliant with comprehensive HEP to maintain progress achieved in OT-MET, updated   Pt will decrease their (R) SF P1 edema by 0.5 cm or more for ease of grasping utensils.-MET  Pt will decrease their (R) MCP edema by 0.5 cm or more for ease of holding steering wheel-MET  Pt will increase their (R) wrist flexion and extension by 10 degrees for ease of dressing-MET  Pt will increase their (R) RF BERGERON to at least 200 degrees for ease of holding cup.-Progressing  Pt will increase their (R) SF BERGERON to at least 200  degrees for ease of cupping medications.-Progressing  Pt will decrease their QuickDASH score by 10% in order to demo improvements in functional independence.-NT   Updated/Additional Goals:  Pt will increase their (R)  strength to at least 25# for ease of holding bow for hunting.-Progressing  Pt will increase their (R) pinch strength scores by 3-5# for ease of opening bottles and jars.-Progressing    Rehab Potential: good    Plan: Continue skilled Occupational Therapy 2x/week or a total of 8 visits over a 90 day period. Treatment will include: Manual Therapy, Soft tissue mobilization, AROM, PROM, Strengthening, Therapeutic Activity, Moist heat, cryotherapy, Ultrasound, Whirlpool (fluidotherapy), Paraffin, Electrical Stim, Orthosis,  Neuromuscular Re-education, Patient education, Home exercise program,           Patient/Family/Caregiver was advised of these findings, precautions, and treatment options and has agreed to actively participate in planning and for this course of care.    Thank you for your referral. If you have any questions, please contact me at Dept: [phone].    Sincerely,  Electronically signed by therapist: Julissa Tidwell, OT    Physician's certification required: Yes  Please co-sign or sign and return this letter via fax as soon as possible to [phone].   I certify the need for these services furnished under this plan of treatment and while under my  care.    X___________________________________________________ Date____________________    Certification From: 12/23/2024  To:3/23/2025                    Treatment Objective: See exercises flowsheet below for exercises and activities performed today. All exercises performed bilaterally.    Date 11/7/2024   11/18/2024   11/21/2024 11/25/2024   12/3/2024  12/5/2024  12/12/2024  12/17/2024  12/23/2024    Visit # 2  3  4  5  6  7  8 9 10                        Evaluation                  Manual                                 PROM into composite flexion PROM into composite flexion 10x1, 10-20 sec holds PROM into composite flexion 10x1, 10-20 sec holds PROM into composite flexion 10x1, 10-20 sec holds    IASTM to volar palm and R SF (5 mins)   Tuning fork vibration scar massage (5 mins)    Ther ex                                   Digital adduction 10x2    Intrinsic plus position 10x2    Hook curl 10x2    Joint blocking MP, PIP, DIP     Fluidotherapy with digital and wrist AROM (7 mins)    Composite fist rolls 20x1    Median nerve glides 10x1    Joint blocking MP, PIP, DIP 10x2   Fluidotherapy with digital and wrist AROM (7 mins)    Composite fist rolls 20x1    Digital adduction 20x1    Joint blocking MP, PIP, DIP 10x2    Fabrication of PIP flexion relative motion orthosis (5 mins) Fluidotherapy with digital and wrist AROM (7 mins)    Composite fist rolls 20x1    Digital adduction 20x1    Yellow putty ex:   5x3  -FDP glides  -lumbrical squeeze  -opposition  -adduction Paraffin with prolonged stretch of the R SF into flexion using coband (5 mins)    Composite fist rolls 20x1    Joint blocking PIP 20x1    Red putty ex: 10x2  -  -lumbrical squeeze  -opposition  -adduction     Paraffin with prolonged stretch of the R SF into flexion using coband (5 mins)    Composite flexion 20x2    Blue Web ex  -  -lumbrical squeeze  10x2    Yellow Flexbar 6# 10x2  -bilateral supination         Paraffin with prolonged stretch of the  R SF into flexion using coband (5 mins)    Composite flexion 20x2    PIP flexion with orthosis 20x1    [place] cupping into thumb<>palm translation (8 mins)         Paraffin with prolonged stretch of the R SF into flexion using coband (5 mins)    Composite flexion 20x2    Table top 10x2    Hook curl 10x2    Full arm median nerve glides 10x1    Red Putty Ex:  -FDS/FDP glides 10x1  -tip pinch SF and thumb 10x2     Paraffin with prolonged stretch of the R SF into flexion using coband (5 mins)    Composite flexion 20x2    Red Putty Ex:  -  -adduction  -opposition  -digital extension  10x3   HEP instruction                     Therapeutic Activity                                Adduction of RF and SF sponge lifts and transports (5 mins)    Composite flexion cone grasp and pull of marbles across the table (10 marbles) Adduction of RF and SF large peg lifts and transports (5 mins)       Composite flexion cone grasp and stack (9 cones) 3 times        Neuromuscular Re-education                                                   Modalities                            Moist Heat applied to digit (3 mins)                     HEP:  Assignment date:   tendon glides, flexion joint blocking, and digital adduction 11/05/24   Median nerve glides 11/18/24           Access Code: [email or web address]  URL: [email or web address]  Date: 12/23/2024  Prepared by: Julissa TAYLOR MS, OTR/L    Exercises  - Putty Squeezes  - 2 x daily - 7 x weekly - 2 sets - 10 reps  - Thumb Opposition with Putty  - 2 x daily - 7 x weekly - 2 sets - 10 reps  - Finger Adduction with Putty  - 2 x daily - 7 x weekly - 2 sets - 10 reps  - Key Pinch with Putty  - 2 x daily - 7 x weekly - 2 sets - 10 reps      Charges: TE 3    Total Timed Treatment: 38 min  Total Treatment Time: 40 min

## 2024-12-31 ENCOUNTER — OFFICE VISIT (OUTPATIENT)
Dept: OCCUPATIONAL MEDICINE | Facility: HOSPITAL | Age: 39
End: 2024-12-31
Attending: STUDENT IN AN ORGANIZED HEALTH CARE EDUCATION/TRAINING PROGRAM
Payer: COMMERCIAL

## 2024-12-31 PROCEDURE — 97110 THERAPEUTIC EXERCISES: CPT

## 2024-12-31 NOTE — PROGRESS NOTES
Diagnosis:   Orthopedic aftercare (Z47.89)       Referring Provider: Luis Mayfield  Date of Evaluation:    11/5/2024    Precautions:  following [place] Hand Protocols Next MD visit:   none scheduled  Date of Surgery: 09/25/24   Insurance Primary/Secondary: BCBS OUT OF STATE / N/A     # Auth Visits: 20 visits 10/11-12/31               Subjective: Pt states numbness when applying passive flexion to her small finger.    Pain: 0/10      Objective: See exercises flowsheet below for exercises and activities performed today. All exercises performed bilaterally.      Assessment: Pt present with glossy exterior of R small finger compared to others. Mild extension lag noted in DIP at -5 degrees. D/t reports of numbness keeping pt up at night, OT recommended and educated pt on nighttime wrist cock-up orthosis to encourage wrist neutral position.      Goals: 2x/week or a total of 10 visits   Pt will be independent and compliant with comprehensive HEP to maintain progress achieved in OT-MET, updated   Pt will decrease their (R) SF P1 edema by 0.5 cm or more for ease of grasping utensils.-MET  Pt will decrease their (R) MCP edema by 0.5 cm or more for ease of holding steering wheel-MET  Pt will increase their (R) wrist flexion and extension by 10 degrees for ease of dressing-MET  Pt will increase their (R) RF BERGERON to at least 200 degrees for ease of holding cup.-Progressing  Pt will increase their (R) SF BERGERON to at least 200  degrees for ease of cupping medications.-Progressing  Pt will decrease their QuickDASH score by 10% in order to demo improvements in functional independence.-NT   Updated/Additional Goals:  Pt will increase their (R)  strength to at least 25# for ease of holding bow for hunting.-Progressing  Pt will increase their (R) pinch strength scores by 3-5# for ease of opening bottles and jars.-Progressing    Plan: follow up on bracing  Date 11/7/2024   11/18/2024   11/21/2024 11/25/2024   12/3/2024  12/5/2024   12/12/2024 12/17/2024 12/23/2024 12/31/2024    Visit # 2  3  4  5  6  7  8 9 10 11                     Progress Note     Evaluation               8 more requested    Manual                                  PROM into composite flexion PROM into composite flexion 10x1, 10-20 sec holds PROM into composite flexion 10x1, 10-20 sec holds PROM into composite flexion 10x1, 10-20 sec holds    IASTM to volar palm and R SF (5 mins)   Tuning fork vibration scar massage (5 mins)     Ther ex                                    Digital adduction 10x2    Intrinsic plus position 10x2    Hook curl 10x2    Joint blocking MP, PIP, DIP     Fluidotherapy with digital and wrist AROM (7 mins)    Composite fist rolls 20x1    Median nerve glides 10x1    Joint blocking MP, PIP, DIP 10x2   Fluidotherapy with digital and wrist AROM (7 mins)    Composite fist rolls 20x1    Digital adduction 20x1    Joint blocking MP, PIP, DIP 10x2    Fabrication of PIP flexion relative motion orthosis (5 mins) Fluidotherapy with digital and wrist AROM (7 mins)    Composite fist rolls 20x1    Digital adduction 20x1    Yellow putty ex:   5x3  -FDP glides  -lumbrical squeeze  -opposition  -adduction Paraffin with prolonged stretch of the R SF into flexion using coband (5 mins)    Composite fist rolls 20x1    Joint blocking PIP 20x1    Red putty ex: 10x2  -  -lumbrical squeeze  -opposition  -adduction     Paraffin with prolonged stretch of the R SF into flexion using coband (5 mins)    Composite flexion 20x2    Blue Web ex  -  -lumbrical squeeze  10x2    Yellow Flexbar 6# 10x2  -bilateral supination         Paraffin with prolonged stretch of the R SF into flexion using coband (5 mins)    Composite flexion 20x2    PIP flexion with orthosis 20x1    [place] cupping into thumb<>palm translation (8 mins)         Paraffin with prolonged stretch of the R SF into flexion using coband (5 mins)    Composite flexion 20x2    Table top 10x2    Hook curl 10x2    Full arm  median nerve glides 10x1    Red Putty Ex:  -FDS/FDP glides 10x1  -tip pinch SF and thumb 10x2     Paraffin with prolonged stretch of the R SF into flexion using coband (5 mins)    Composite flexion 20x2    Red Putty Ex:  -  -adduction  -opposition  -digital extension  10x3 Paraffin with prolonged stretch of the R SF into flexion using coband (5 mins)    Composite flexion 20x2    [place] cupping into thumb<>palm translation (8 mins)    Blue Web Ex:   -lumbrical squeeze  10x2    Rubber band ex  -lumbrical extension  -abduction  10x2   HEP instruction                      Therapeutic Activity                                 Adduction of RF and SF sponge lifts and transports (5 mins)    Composite flexion cone grasp and pull of marbles across the table (10 marbles) Adduction of RF and SF large peg lifts and transports (5 mins)       Composite flexion cone grasp and stack (9 cones) 3 times         Neuromuscular Re-education                                                     Modalities                             Moist Heat applied to digit (3 mins)                      HEP:  Assignment date:   tendon glides, flexion joint blocking, and digital adduction 11/05/24   Median nerve glides 11/18/24           Access Code: [email or web address]  URL: [email or web address]  Date: 12/23/2024  Prepared by: Julissa TAYLOR MS, OTR/L    Exercises  - Putty Squeezes  - 2 x daily - 7 x weekly - 2 sets - 10 reps  - Thumb Opposition with Putty  - 2 x daily - 7 x weekly - 2 sets - 10 reps  - Finger Adduction with Putty  - 2 x daily - 7 x weekly - 2 sets - 10 reps  - Key Pinch with Putty  - 2 x daily - 7 x weekly - 2 sets - 10 reps      Charges: TE 3    Total Timed Treatment: 42 min  Total Treatment Time: 42 min

## 2025-01-03 ENCOUNTER — OFFICE VISIT (OUTPATIENT)
Dept: SURGERY | Facility: CLINIC | Age: 40
End: 2025-01-03
Payer: COMMERCIAL

## 2025-01-03 VITALS
DIASTOLIC BLOOD PRESSURE: 80 MMHG | SYSTOLIC BLOOD PRESSURE: 132 MMHG | OXYGEN SATURATION: 100 % | WEIGHT: 139 LBS | BODY MASS INDEX: 29.99 KG/M2 | HEIGHT: 57.2 IN | HEART RATE: 89 BPM

## 2025-01-03 DIAGNOSIS — E44.1 MILD PROTEIN-CALORIE MALNUTRITION (HCC): Primary | ICD-10-CM

## 2025-01-03 DIAGNOSIS — E11.649 TYPE 2 DIABETES MELLITUS WITH HYPOGLYCEMIA WITHOUT COMA, WITHOUT LONG-TERM CURRENT USE OF INSULIN (HCC): ICD-10-CM

## 2025-01-03 DIAGNOSIS — E66.9 OBESITY (BMI 30-39.9): ICD-10-CM

## 2025-01-03 DIAGNOSIS — I10 ESSENTIAL HYPERTENSION: ICD-10-CM

## 2025-01-03 DIAGNOSIS — Z98.84 S/P GASTRIC BYPASS: ICD-10-CM

## 2025-01-03 DIAGNOSIS — E66.3 OVERWEIGHT (BMI 25.0-29.9): ICD-10-CM

## 2025-01-03 PROCEDURE — 99214 OFFICE O/P EST MOD 30 MIN: CPT | Performed by: INTERNAL MEDICINE

## 2025-01-03 PROCEDURE — 3075F SYST BP GE 130 - 139MM HG: CPT | Performed by: INTERNAL MEDICINE

## 2025-01-03 PROCEDURE — 3008F BODY MASS INDEX DOCD: CPT | Performed by: INTERNAL MEDICINE

## 2025-01-03 PROCEDURE — 3079F DIAST BP 80-89 MM HG: CPT | Performed by: INTERNAL MEDICINE

## 2025-01-03 RX ORDER — SEMAGLUTIDE 0.68 MG/ML
0.5 INJECTION, SOLUTION SUBCUTANEOUS WEEKLY
Qty: 9 ML | Refills: 1 | Status: SHIPPED | OUTPATIENT
Start: 2025-01-03 | End: 2025-04-03

## 2025-01-03 RX ORDER — PHENTERMINE HYDROCHLORIDE 15 MG/1
15 CAPSULE ORAL EVERY MORNING
Qty: 30 CAPSULE | Refills: 5 | Status: SHIPPED | OUTPATIENT
Start: 2025-01-03

## 2025-01-03 RX ORDER — SEMAGLUTIDE 0.68 MG/ML
0.25 INJECTION, SOLUTION SUBCUTANEOUS WEEKLY
Qty: 6 ML | Refills: 1 | Status: SHIPPED | OUTPATIENT
Start: 2025-01-03 | End: 2025-01-03

## 2025-01-03 NOTE — PROGRESS NOTES
[facility], [facility], [place]  [address]  [facility] [address]  Dept: [phone]    Patient:  Gisell Zepeda  :      1985  MRN:      [id number]    Referring Provider: Althea Delgado MD     Chief Complaint:     Chief Complaint   Patient presents with    Follow - Up    Post-Op     Wt ck        Date of Surgery:   2020  Surgery Type:   Laparoscopic gastric bypass surgery     Subjective     Satiety:  positive  Food Intake:  <01 cup(s)  Fluid Intake:  adeq oz  Protein Intake:  adeq grams  Vitamin:  Yes   Bariatric choice  Exercise: Yes  Comorbidities:  SOB/BURRELL-Improvement?  yes, Back pain-Improvement?  yes, Joint pain-Improvement?  yes, Diabetes-Improvement?  yes, Hypertension-Improvement?  yes, TERESE-Improvement?  yes and Snoring-Improvement?  yes    Objective     Vitals: /80   Pulse 89   Ht 4' 9.2\" (1.453 m)   Wt 139 lb (63 kg)   LMP 10/31/2024 (Approximate)   SpO2 100%   BMI 29.87 kg/m²     Starting weight: 233   Current weight:    Interval weight loss: +04   Total weight loss:  -94    Last 3 Weights   25 0912 139 lb (63 kg)   24 1056 138 lb 4.8 oz (62.7 kg)   24 1630 131 lb (59.4 kg)   24 0928 138 lb 12.8 oz (63 kg)       Patient Medications:    Current Outpatient Medications:     sulfamethoxazole-trimethoprim -160 MG Oral Tab per tablet, Take 1 tablet by mouth 2 (two) times daily., Disp: 20 tablet, Rfl: 0    atorvastatin 10 MG Oral Tab, Take 1 tablet (10 mg total) by mouth nightly., Disp: 90 tablet, Rfl: 3    metFORMIN 500 MG Oral Tab, Take 1.5 tabs before breakfast and 1.5 tabs before dinner, Disp: 270 tablet, Rfl: 0    ondansetron (ZOFRAN) 4 mg tablet, Take 1 tablet (4 mg total) by mouth every 8 (eight) hours as needed for Nausea., Disp: 10 tablet, Rfl: 0    Acetaminophen 500 MG Oral Cap, Take 2 capsules (1,000 mg total) by mouth every 8 (eight) hours as needed for Pain. Never exceed 3000 mg in a  24-hour period.  Many over-the-counter medications also have acetaminophen in them., Disp: 180 capsule, Rfl: 0    senna-docusate 8.6-50 MG Oral Tab, Take 2 tablets by mouth at bedtime., Disp: 28 tablet, Rfl: 0    Vitamin C 500 MG Oral Tab, Take 1 tablet (500 mg total) by mouth in the morning and 1 tablet (500 mg total) before bedtime., Disp: 84 tablet, Rfl: 0    gabapentin 300 MG Oral Cap, Take 1 capsule (300 mg total) by mouth every 8 (eight) hours., Disp: 30 capsule, Rfl: 0    Phentermine HCl 15 MG Oral Cap, Take 1 capsule (15 mg total) by mouth every morning., Disp: 30 capsule, Rfl: 5    Norgestim-Eth Estrad Triphasic (ORTHO TRI-CYCLEN LO) 0.18/0.215/0.25 MG-25 MCG Oral Tab, Take 1 tablet by mouth daily. (Patient taking differently: Take 1 tablet by mouth every evening.), Disp: 84 tablet, Rfl: 3    Glucose Blood (ONETOUCH ULTRA) In Vitro Strip, CHECK BLOOD SUGAR TWICE DAILY, Disp: 200 strip, Rfl: 3    fluconazole 200 MG Oral Tab, Take 4 tablets (800 mg total) by mouth daily., Disp: 28 tablet, Rfl: 0    Lancets Does not apply Misc, Check blood sugar twice daily., Disp: 50 each, Rfl: 2    Blood Glucose Monitoring Suppl (ONETOUCH ULTRA 2) w/Device Does not apply Kit, Check blood sugar 2 times daily, Disp: 1 kit, Rfl: 0    Continuous Blood Gluc  (DEXCOM G6 ) Does not apply Device, 1 each by Does not apply route See Admin Instructions., Disp: 1 Device, Rfl: 0    Continuous Blood Gluc Sensor (DEXCOM G6 SENSOR) Does not apply Misc, 1 each by Does not apply route See Admin Instructions., Disp: 9 each, Rfl: 3    Continuous Blood Gluc Transmit (DEXCOM G6 TRANSMITTER) Does not apply Misc, 1 each by Does not apply route See Admin Instructions., Disp: 3 each, Rfl: 3    Multiple Vitamin (MULTIVITAMINS OR), Take by mouth. Bariatric vitamins , Disp: , Rfl:     Allergies:  Patient has no known allergies.     Social History:    Social History     Socioeconomic History    Marital status: Single   Occupational  History    Occupation: HR.    Tobacco Use    Smoking status: Never     Passive exposure: Never    Smokeless tobacco: Never   Vaping Use    Vaping status: Never Used   Substance and Sexual Activity    Alcohol use: Yes     Comment: socially     Drug use: Never     Surgical History:    Past Surgical History:   Procedure Laterality Date    Hc surgery catheter eps dx/ablation other than 3d c1733      abdominoplasty, removal of excess skin on bilateral arms    Lap gastric bypass/nayan-en-y  09/14/2020    Dr Black, [facility]       Family History:    Family History   Problem Relation Age of Onset    Diabetes Mother     Breast Cancer Mother 62    Hypertension Father     Diabetes Father     Other (EToHism. ) Maternal Grandmother     Diabetes Paternal Grandmother     Hypertension Paternal Grandmother     Lipids Paternal Grandmother     Renal Disease Paternal Grandmother     Diabetes Paternal Grandfather         Type 1, IDDM with retinoapthy.     Stroke Paternal Grandfather     Other (PVD) Paternal Grandfather     Other (retinopathy) Paternal Grandfather     Breast Cancer Paternal Aunt        Physical Exam:  Vital signs: /80   Pulse 89   Ht 4' 9.2\" (1.453 m)   Wt 139 lb (63 kg)   LMP 10/31/2024 (Approximate)   SpO2 100%   BMI 29.87 kg/m²   General appearance: alert, appears stated age, cooperative and mildly obese  Head: Normocephalic, without obvious abnormality, atraumatic  Eyes: conjunctivae/corneas clear. PERRL, EOM's intact. Fundi benign.  Neck: no adenopathy, no carotid bruit, no JVD, supple, symmetrical, trachea midline and thyroid not enlarged, symmetric, no tenderness/mass/nodules  Back: symmetric, no curvature. ROM normal. No CVA tenderness.  Lungs: clear to auscultation bilaterally  Heart: S1, S2 normal, no murmur, click, rub or gallop, regular rate and rhythm  Abdomen:  incicisons are healing well  Extremities: extremities normal, atraumatic, no cyanosis or edema  Pulses: 2+ and  symmetric  Neurologic: Grossly normal       ROS:    Constitutional: negative  Respiratory: negative  Cardiovascular: negative  Gastrointestinal: negative  Musculoskeletal:negative  Neurological: negative  Behavioral/Psych: negative  Endocrine: negative  All other systems were reviewed and are negative    Assessment     DIABETES:  The patient's blood sugars were reviewed with the patient with averages ranging from .  The patient denies any episodes of hypoglycemia since her last clinic visit.  she denies any lower extremity skin breakdown or foot ulcers.    HYPERTENSION:  The patient's blood pressure has been well controlled.  she has been checking it as instructed and has remained in relatively good control.    HYPERCHOLESTEROLEMIA:  The patient states that her cholesterol has been well controlled on her current medication.    Lab Results   Component Value Date/Time    CHOLEST 175 09/25/2024 10:08 AM    LDL 93 09/25/2024 10:08 AM    HDL 64 (H) 09/25/2024 10:08 AM    TRIG 98 09/25/2024 10:08 AM    VLDL 16 09/25/2024 10:08 AM       Encounter Diagnosis(ses):   Encounter Diagnoses   Name Primary?    Mild protein-calorie malnutrition (HCC) Yes    Type 2 diabetes mellitus with hypoglycemia without coma, without long-term current use of insulin (HCC)     Essential hypertension     S/P gastric bypass     Overweight (BMI 25.0-29.9)        Plan     S/P Pancho en y 09/14/2020    Doing well    DM2:  Blood sugars elevated after she goes for a walk  continue metformin 500 BID  Ozempic 0.25 mg weekly. Increase to 0.5 mg    Continue to work out as scheduled    Follow up with RD and surgical team as scheduled    Bariatric labs due June 2025      PHENTERMINE: tolerating well  ekg done       abdominoplasty with muscle repair/lipo in [facility]  breast lift and arms done (Aug)  May get leg lift       No orders of the defined types were placed in this encounter.        Rex Fishman MD

## 2025-01-07 ENCOUNTER — OFFICE VISIT (OUTPATIENT)
Dept: OCCUPATIONAL MEDICINE | Facility: HOSPITAL | Age: 40
End: 2025-01-07
Attending: STUDENT IN AN ORGANIZED HEALTH CARE EDUCATION/TRAINING PROGRAM
Payer: COMMERCIAL

## 2025-01-07 PROCEDURE — 97110 THERAPEUTIC EXERCISES: CPT

## 2025-01-07 NOTE — PROGRESS NOTES
Diagnosis:   Orthopedic aftercare (Z47.89)       Referring Provider: Luis Mayfield  Date of Evaluation:    11/5/2024    Precautions:  following [place] Hand Protocols Next MD visit:   none scheduled  Date of Surgery: 09/25/24   Insurance Primary/Secondary: BCBS OUT OF STATE / N/A     # Auth Visits: 20 visits 10/11-12/31               Subjective: Pt states getting nighttime brace today.    Pain: 0/10      Objective: See exercises flowsheet below for exercises and activities performed today. All exercises performed bilaterally.      Assessment: Pt continued to verbalize cramping and numbness with gripping or prolonged holds in flexion. Mild improvements noted in DIP flexion, PIP flexion hard end range. OT began fabrication of exercise orthosis to enable pt to perform prolonged R SF flexion. Continued modifications and training will be needed before adding to HEP      Goals: 2x/week or a total of 10 visits   Pt will be independent and compliant with comprehensive HEP to maintain progress achieved in OT-MET, updated   Pt will decrease their (R) SF P1 edema by 0.5 cm or more for ease of grasping utensils.-MET  Pt will decrease their (R) MCP edema by 0.5 cm or more for ease of holding steering wheel-MET  Pt will increase their (R) wrist flexion and extension by 10 degrees for ease of dressing-MET  Pt will increase their (R) RF BERGERON to at least 200 degrees for ease of holding cup.-Progressing  Pt will increase their (R) SF BERGERON to at least 200  degrees for ease of cupping medications.-Progressing  Pt will decrease their QuickDASH score by 10% in order to demo improvements in functional independence.-NT   Updated/Additional Goals:  Pt will increase their (R)  strength to at least 25# for ease of holding bow for hunting.-Progressing  Pt will increase their (R) pinch strength scores by 3-5# for ease of opening bottles and jars.-Progressing    Plan: follow up on receiving brace, continue fabricated exercise orthosis  Date  11/7/2024   11/18/2024   11/21/2024 11/25/2024   12/3/2024  12/5/2024  12/12/2024  12/17/2024  12/23/2024  12/31/2024  1/7/2025    Visit # 2  3  4  5  6  7  8 9 10 11 12                     Progress Note      Evaluation               8 more requested     Manual                                   PROM into composite flexion PROM into composite flexion 10x1, 10-20 sec holds PROM into composite flexion 10x1, 10-20 sec holds PROM into composite flexion 10x1, 10-20 sec holds    IASTM to volar palm and R SF (5 mins)   Tuning fork vibration scar massage (5 mins)   PROM with 10-20 second holds   Ther ex                                     Digital adduction 10x2    Intrinsic plus position 10x2    Hook curl 10x2    Joint blocking MP, PIP, DIP     Fluidotherapy with digital and wrist AROM (7 mins)    Composite fist rolls 20x1    Median nerve glides 10x1    Joint blocking MP, PIP, DIP 10x2   Fluidotherapy with digital and wrist AROM (7 mins)    Composite fist rolls 20x1    Digital adduction 20x1    Joint blocking MP, PIP, DIP 10x2    Fabrication of PIP flexion relative motion orthosis (5 mins) Fluidotherapy with digital and wrist AROM (7 mins)    Composite fist rolls 20x1    Digital adduction 20x1    Yellow putty ex:   5x3  -FDP glides  -lumbrical squeeze  -opposition  -adduction Paraffin with prolonged stretch of the R SF into flexion using coband (5 mins)    Composite fist rolls 20x1    Joint blocking PIP 20x1    Red putty ex: 10x2  -  -lumbrical squeeze  -opposition  -adduction     Paraffin with prolonged stretch of the R SF into flexion using coband (5 mins)    Composite flexion 20x2    Blue Web ex  -  -lumbrical squeeze  10x2    Yellow Flexbar 6# 10x2  -bilateral supination         Paraffin with prolonged stretch of the R SF into flexion using coband (5 mins)    Composite flexion 20x2    PIP flexion with orthosis 20x1    [place] cupping into thumb<>palm translation (8 mins)         Paraffin with prolonged stretch of  the R SF into flexion using coband (5 mins)    Composite flexion 20x2    Table top 10x2    Hook curl 10x2    Full arm median nerve glides 10x1    Red Putty Ex:  -FDS/FDP glides 10x1  -tip pinch SF and thumb 10x2     Paraffin with prolonged stretch of the R SF into flexion using coband (5 mins)    Composite flexion 20x2    Red Putty Ex:  -  -adduction  -opposition  -digital extension  10x3 Paraffin with prolonged stretch of the R SF into flexion using coband (5 mins)    Composite flexion 20x2    [place] cupping into thumb<>palm translation (8 mins)    Blue Web Ex:   -lumbrical squeeze  10x2    Rubber band ex  -lumbrical extension  -abduction  10x2 Paraffin with prolonged stretch of the R SF into flexion using coband (5 mins)    Composite flexion 20x2    Digital abd/aduction 10x2    Blue Web Ex:   -lumbrical squeeze  -Lumbrical extension  12x2          Prolonged flexion stretch orthosis fabrication initiation   HEP instruction                       Therapeutic Activity                                  Adduction of RF and SF sponge lifts and transports (5 mins)    Composite flexion cone grasp and pull of marbles across the table (10 marbles) Adduction of RF and SF large peg lifts and transports (5 mins)       Composite flexion cone grasp and stack (9 cones) 3 times          Neuromuscular Re-education                                                       Modalities                              Moist Heat applied to digit (3 mins)                       HEP:  Assignment date:   tendon glides, flexion joint blocking, and digital adduction 11/05/24   Median nerve glides 11/18/24           Access Code: [email or web address]  URL: [email or web address]  Date: 12/23/2024  Prepared by: Julissa TAYLOR MS, OTR/L    Exercises  - Putty Squeezes  - 2 x daily - 7 x weekly - 2 sets - 10 reps  - Thumb Opposition with Putty  - 2 x daily - 7 x weekly - 2 sets - 10 reps  - Finger Adduction with Putty  - 2 x daily - 7 x weekly - 2 sets -  10 reps  - Key Pinch with Putty  - 2 x daily - 7 x weekly - 2 sets - 10 reps      Charges: TE 3    Total Timed Treatment: 40 min  Total Treatment Time: 40 min

## 2025-01-09 ENCOUNTER — OFFICE VISIT (OUTPATIENT)
Dept: OCCUPATIONAL MEDICINE | Facility: HOSPITAL | Age: 40
End: 2025-01-09
Attending: STUDENT IN AN ORGANIZED HEALTH CARE EDUCATION/TRAINING PROGRAM
Payer: COMMERCIAL

## 2025-01-09 PROCEDURE — 97110 THERAPEUTIC EXERCISES: CPT

## 2025-01-09 PROCEDURE — 97760 ORTHOTIC MGMT&TRAING 1ST ENC: CPT

## 2025-01-09 NOTE — PROGRESS NOTES
Diagnosis:   Orthopedic aftercare (Z47.89)       Referring Provider: Luis Mayfield  Date of Evaluation:    11/5/2024    Precautions:  following [place] Hand Protocols Next MD visit:   none scheduled  Date of Surgery: 09/25/24   Insurance Primary/Secondary: BCBS OUT OF STATE / N/A     # Auth Visits: 20 visits 10/11-12/31               Subjective: Pt states purchasing and using brace at night. \"Using the brace to sleep was the first night I didn't wake up at all.\"    Pain: 0/10      Objective: See exercises flowsheet below for exercises and activities performed today. All exercises performed bilaterally.      Assessment: Completed fabrication of prolonged low load orthosis for PIP flexion. Pt demo'd ability to don/doff orthosis. OT instructed pt to trial for 10-15 minutes and progress to 30 mins in prolonged stretch. With trial in treatment pt demo'd more flexion at the PIP.      Goals: 2x/week or a total of 10 visits   Pt will be independent and compliant with comprehensive HEP to maintain progress achieved in OT-MET, updated   Pt will decrease their (R) SF P1 edema by 0.5 cm or more for ease of grasping utensils.-MET  Pt will decrease their (R) MCP edema by 0.5 cm or more for ease of holding steering wheel-MET  Pt will increase their (R) wrist flexion and extension by 10 degrees for ease of dressing-MET  Pt will increase their (R) RF BERGERON to at least 200 degrees for ease of holding cup.-Progressing  Pt will increase their (R) SF BERGERON to at least 200  degrees for ease of cupping medications.-Progressing  Pt will decrease their QuickDASH score by 10% in order to demo improvements in functional independence.-NT   Updated/Additional Goals:  Pt will increase their (R)  strength to at least 25# for ease of holding bow for hunting.-Progressing  Pt will increase their (R) pinch strength scores by 3-5# for ease of opening bottles and jars.-Progressing    Plan: follow up on orthosis  Date 11/7/2024 11/18/2024 11/21/2024  11/25/2024   12/3/2024  12/5/2024  12/12/2024  12/17/2024  12/23/2024  12/31/2024  1/7/2025  1/9/2025    Visit # 2  3  4  5  6  7  8 9 10 11 12 13                     Progress Note       Evaluation               8 more requested      Manual                                    PROM into composite flexion PROM into composite flexion 10x1, 10-20 sec holds PROM into composite flexion 10x1, 10-20 sec holds PROM into composite flexion 10x1, 10-20 sec holds    IASTM to volar palm and R SF (5 mins)   Tuning fork vibration scar massage (5 mins)   PROM with 10-20 second holds PROM with 10-20 second holds   Ther ex                                      Digital adduction 10x2    Intrinsic plus position 10x2    Hook curl 10x2    Joint blocking MP, PIP, DIP     Fluidotherapy with digital and wrist AROM (7 mins)    Composite fist rolls 20x1    Median nerve glides 10x1    Joint blocking MP, PIP, DIP 10x2   Fluidotherapy with digital and wrist AROM (7 mins)    Composite fist rolls 20x1    Digital adduction 20x1    Joint blocking MP, PIP, DIP 10x2    Fabrication of PIP flexion relative motion orthosis (5 mins) Fluidotherapy with digital and wrist AROM (7 mins)    Composite fist rolls 20x1    Digital adduction 20x1    Yellow putty ex:   5x3  -FDP glides  -lumbrical squeeze  -opposition  -adduction Paraffin with prolonged stretch of the R SF into flexion using coband (5 mins)    Composite fist rolls 20x1    Joint blocking PIP 20x1    Red putty ex: 10x2  -  -lumbrical squeeze  -opposition  -adduction     Paraffin with prolonged stretch of the R SF into flexion using coband (5 mins)    Composite flexion 20x2    Blue Web ex  -  -lumbrical squeeze  10x2    Yellow Flexbar 6# 10x2  -bilateral supination         Paraffin with prolonged stretch of the R SF into flexion using coband (5 mins)    Composite flexion 20x2    PIP flexion with orthosis 20x1    [place] cupping into thumb<>palm translation (8 mins)         Paraffin with prolonged  stretch of the R SF into flexion using coband (5 mins)    Composite flexion 20x2    Table top 10x2    Hook curl 10x2    Full arm median nerve glides 10x1    Red Putty Ex:  -FDS/FDP glides 10x1  -tip pinch SF and thumb 10x2     Paraffin with prolonged stretch of the R SF into flexion using coband (5 mins)    Composite flexion 20x2    Red Putty Ex:  -  -adduction  -opposition  -digital extension  10x3 Paraffin with prolonged stretch of the R SF into flexion using coband (5 mins)    Composite flexion 20x2    [place] cupping into thumb<>palm translation (8 mins)    Blue Web Ex:   -lumbrical squeeze  10x2    Rubber band ex  -lumbrical extension  -abduction  10x2 Paraffin with prolonged stretch of the R SF into flexion using coband (5 mins)    Composite flexion 20x2    Digital abd/aduction 10x2    Blue Web Ex:   -lumbrical squeeze  -Lumbrical extension  12x2          Prolonged flexion stretch orthosis fabrication initiation Paraffin with prolonged stretch of the R SF into flexion using coband (5 mins)    Joint blocking MP, PIP, DIP 10x2    Median nerve glides 10x2    Digital abd/aduction 10x2    Blue Web Ex:   -lumbrical squeeze  -Lumbrical extension  12x2    Low load Prolonged flexion stretch orthosis fabrication initiation completed   HEP instruction                        Therapeutic Activity                                   Adduction of RF and SF sponge lifts and transports (5 mins)    Composite flexion cone grasp and pull of marbles across the table (10 marbles) Adduction of RF and SF large peg lifts and transports (5 mins)       Composite flexion cone grasp and stack (9 cones) 3 times           Neuromuscular Re-education                                                         Modalities                               Moist Heat applied to digit (3 mins)                        HEP:  Assignment date:   tendon glides, flexion joint blocking, and digital adduction 11/05/24   Median nerve glides 11/18/24            Access Code: [email or web address]  URL: [email or web address]  Date: 12/23/2024  Prepared by: Julissa TAYLOR MS, OTR/L    Exercises  - Putty Squeezes  - 2 x daily - 7 x weekly - 2 sets - 10 reps  - Thumb Opposition with Putty  - 2 x daily - 7 x weekly - 2 sets - 10 reps  - Finger Adduction with Putty  - 2 x daily - 7 x weekly - 2 sets - 10 reps  - Key Pinch with Putty  - 2 x daily - 7 x weekly - 2 sets - 10 reps      Charges: Orthofit 1 (15), TE 2 (30)  Total Timed Treatment: 45 min  Total Treatment Time: 45 min

## 2025-01-13 ENCOUNTER — OFFICE VISIT (OUTPATIENT)
Dept: OCCUPATIONAL MEDICINE | Facility: HOSPITAL | Age: 40
End: 2025-01-13
Attending: STUDENT IN AN ORGANIZED HEALTH CARE EDUCATION/TRAINING PROGRAM
Payer: COMMERCIAL

## 2025-01-13 PROCEDURE — 97110 THERAPEUTIC EXERCISES: CPT

## 2025-01-13 NOTE — PROGRESS NOTES
Diagnosis:   Orthopedic aftercare (Z47.89)       Referring Provider: Luis Mayfield  Date of Evaluation:    11/5/2024    Precautions:  following [place] Hand Protocols Next MD visit:   none scheduled  Date of Surgery: 09/25/24   Insurance Primary/Secondary: BCBS OUT OF STATE / N/A     # Auth Visits: 20 visits 10/11-12/31               Subjective: Pt states nighttime orthosis helping a lot. Pt verbalized need for modifications to stretching orthosis.     Pain: 0/10      Objective: See exercises flowsheet below for exercises and activities performed today. All exercises performed bilaterally.    RIGHT   SF Eval  SF 11/21/2024 SF 12/5/2024 SF 12/23/2024  SF 1/13/2025   MP 0/35 0/60 0/65 0/65 0/65   PIP -24/25 -15/25 -20/25 -15/30 -15/30   DIP 0/0 0/5 0/5 0/10 0/15   BERGERON 36 75 75 90 95         Assessment: Modifications made to orthosis including additional straps for stabilization. Pt gained 5 degrees of BERGERON in the R SF.      Goals: 2x/week or a total of 18 visits   Pt will be independent and compliant with comprehensive HEP to maintain progress achieved in OT-MET, updated   Pt will decrease their (R) SF P1 edema by 0.5 cm or more for ease of grasping utensils.-MET  Pt will decrease their (R) MCP edema by 0.5 cm or more for ease of holding steering wheel-MET  Pt will increase their (R) wrist flexion and extension by 10 degrees for ease of dressing-MET  Pt will increase their (R) RF BERGERON to at least 200 degrees for ease of holding cup.-Progressing  Pt will increase their (R) SF BERGERON to at least 200  degrees for ease of cupping medications.-Progressing  Pt will decrease their QuickDASH score by 10% in order to demo improvements in functional independence.-NT   Updated/Additional Goals:  Pt will increase their (R)  strength to at least 25# for ease of holding bow for hunting.-Progressing  Pt will increase their (R) pinch strength scores by 3-5# for ease of opening bottles and jars.-Progressing    Plan: follow up on  orthosis modifications  Date 11/7/2024   11/18/2024   11/21/2024 11/25/2024   12/3/2024  12/5/2024  12/12/2024  12/17/2024  12/23/2024  12/31/2024  1/7/2025  1/9/2025  1/13/2025    Visit # 2  3  4  5  6  7  8 9 10 11 12 13 14                     Progress Note        Evaluation               8 more requested       Manual                                     PROM into composite flexion PROM into composite flexion 10x1, 10-20 sec holds PROM into composite flexion 10x1, 10-20 sec holds PROM into composite flexion 10x1, 10-20 sec holds    IASTM to volar palm and R SF (5 mins)   Tuning fork vibration scar massage (5 mins)   PROM with 10-20 second holds PROM with 10-20 second holds    Ther ex                                       Digital adduction 10x2    Intrinsic plus position 10x2    Hook curl 10x2    Joint blocking MP, PIP, DIP     Fluidotherapy with digital and wrist AROM (7 mins)    Composite fist rolls 20x1    Median nerve glides 10x1    Joint blocking MP, PIP, DIP 10x2   Fluidotherapy with digital and wrist AROM (7 mins)    Composite fist rolls 20x1    Digital adduction 20x1    Joint blocking MP, PIP, DIP 10x2    Fabrication of PIP flexion relative motion orthosis (5 mins) Fluidotherapy with digital and wrist AROM (7 mins)    Composite fist rolls 20x1    Digital adduction 20x1    Yellow putty ex:   5x3  -FDP glides  -lumbrical squeeze  -opposition  -adduction Paraffin with prolonged stretch of the R SF into flexion using coband (5 mins)    Composite fist rolls 20x1    Joint blocking PIP 20x1    Red putty ex: 10x2  -  -lumbrical squeeze  -opposition  -adduction     Paraffin with prolonged stretch of the R SF into flexion using coband (5 mins)    Composite flexion 20x2    Blue Web ex  -  -lumbrical squeeze  10x2    Yellow Flexbar 6# 10x2  -bilateral supination         Paraffin with prolonged stretch of the R SF into flexion using coband (5 mins)    Composite flexion 20x2    PIP flexion with orthosis  20x1    [place] cupping into thumb<>palm translation (8 mins)         Paraffin with prolonged stretch of the R SF into flexion using coband (5 mins)    Composite flexion 20x2    Table top 10x2    Hook curl 10x2    Full arm median nerve glides 10x1    Red Putty Ex:  -FDS/FDP glides 10x1  -tip pinch SF and thumb 10x2     Paraffin with prolonged stretch of the R SF into flexion using coband (5 mins)    Composite flexion 20x2    Red Putty Ex:  -  -adduction  -opposition  -digital extension  10x3 Paraffin with prolonged stretch of the R SF into flexion using coband (5 mins)    Composite flexion 20x2    [place] cupping into thumb<>palm translation (8 mins)    Blue Web Ex:   -lumbrical squeeze  10x2    Rubber band ex  -lumbrical extension  -abduction  10x2 Paraffin with prolonged stretch of the R SF into flexion using coband (5 mins)    Composite flexion 20x2    Digital abd/aduction 10x2    Blue Web Ex:   -lumbrical squeeze  -Lumbrical extension  12x2          Prolonged flexion stretch orthosis fabrication initiation Paraffin with prolonged stretch of the R SF into flexion using coband (5 mins)    Joint blocking MP, PIP, DIP 10x2    Median nerve glides 10x2    Digital abd/aduction 10x2    Blue Web Ex:   -lumbrical squeeze  -Lumbrical extension  12x2    Low load Prolonged flexion stretch orthosis fabrication initiation completed Paraffin with prolonged stretch of the R SF into flexion using coband (5 mins)    Red putty  -FDP glides of SF into full composite FDP glides 10x2    Hook curls 10x3    Blue Web Ex:   -lumbrical squeeze  15x2   HEP instruction                         Therapeutic Activity                                    Adduction of RF and SF sponge lifts and transports (5 mins)    Composite flexion cone grasp and pull of marbles across the table (10 marbles) Adduction of RF and SF large peg lifts and transports (5 mins)       Composite flexion cone grasp and stack (9 cones) 3 times            Neuromuscular  Re-education                                                           Modalities                                Moist Heat applied to digit (3 mins)                         HEP:  Assignment date:   tendon glides, flexion joint blocking, and digital adduction 11/05/24   Median nerve glides 11/18/24           Access Code: [email or web address]  URL: [email or web address]  Date: 12/23/2024  Prepared by: Julissa TAYLOR MS, OTR/L    Exercises  - Putty Squeezes  - 2 x daily - 7 x weekly - 2 sets - 10 reps  - Thumb Opposition with Putty  - 2 x daily - 7 x weekly - 2 sets - 10 reps  - Finger Adduction with Putty  - 2 x daily - 7 x weekly - 2 sets - 10 reps  - Key Pinch with Putty  - 2 x daily - 7 x weekly - 2 sets - 10 reps      Charges: TE 3  Total Timed Treatment: 45 min  Total Treatment Time: 45 min

## 2025-01-15 ENCOUNTER — OFFICE VISIT (OUTPATIENT)
Dept: OCCUPATIONAL MEDICINE | Facility: HOSPITAL | Age: 40
End: 2025-01-15
Attending: STUDENT IN AN ORGANIZED HEALTH CARE EDUCATION/TRAINING PROGRAM
Payer: COMMERCIAL

## 2025-01-15 PROCEDURE — 97110 THERAPEUTIC EXERCISES: CPT

## 2025-01-15 NOTE — PROGRESS NOTES
Diagnosis:   Orthopedic aftercare (Z47.89)       Referring Provider: Luis Mayfield  Date of Evaluation:    11/5/2024    Precautions:  following [place] Hand Protocols Next MD visit:   none scheduled  Date of Surgery: 09/25/24   Insurance Primary/Secondary: BCBS OUT OF STATE / N/A     # Auth Visits: 20 visits 10/11-12/31               Subjective: Pt states new modifications to orthosis have been helpful.    Pain: 0/10      Objective: See exercises flowsheet below for exercises and activities performed today. All exercises performed bilaterally.    RIGHT   SF Eval  SF 11/21/2024 SF 12/5/2024 SF 12/23/2024  SF 1/13/2025   MP 0/35 0/60 0/65 0/65 0/65   PIP -24/25 -15/25 -20/25 -15/30 -15/30   DIP 0/0 0/5 0/5 0/10 0/15   BERGERON 36 75 75 90 95       Assessment: PIP flexion continues actively be difficult, however, more passive range achievable. Softer end feel than prior sessions. Exercises modified to ensure protection of carpal tunnel and decrease repetitive gripping.      Goals: 2x/week or a total of 18 visits   Pt will be independent and compliant with comprehensive HEP to maintain progress achieved in OT-MET, updated   Pt will decrease their (R) SF P1 edema by 0.5 cm or more for ease of grasping utensils.-MET  Pt will decrease their (R) MCP edema by 0.5 cm or more for ease of holding steering wheel-MET  Pt will increase their (R) wrist flexion and extension by 10 degrees for ease of dressing-MET  Pt will increase their (R) RF BERGERON to at least 200 degrees for ease of holding cup.-Progressing  Pt will increase their (R) SF BERGERON to at least 200  degrees for ease of cupping medications.-Progressing  Pt will decrease their QuickDASH score by 10% in order to demo improvements in functional independence.-NT   Updated/Additional Goals:  Pt will increase their (R)  strength to at least 25# for ease of holding bow for hunting.-Progressing  Pt will increase their (R) pinch strength scores by 3-5# for ease of opening bottles and  brianna.-Progressing    Plan: Continue PIP flexion challenges  Date 11/7/2024   11/18/2024   11/21/2024 11/25/2024   12/3/2024  12/5/2024  12/12/2024  12/17/2024  12/23/2024  12/31/2024  1/7/2025  1/9/2025  1/13/2025  1/15/2025    Visit # 2  3  4  5  6  7  8 9 10 11 12 13 14 15                     Progress Note         Evaluation               8 more requested        Manual                                      PROM into composite flexion PROM into composite flexion 10x1, 10-20 sec holds PROM into composite flexion 10x1, 10-20 sec holds PROM into composite flexion 10x1, 10-20 sec holds    IASTM to volar palm and R SF (5 mins)   Tuning fork vibration scar massage (5 mins)   PROM with 10-20 second holds PROM with 10-20 second holds     Ther ex                                        Digital adduction 10x2    Intrinsic plus position 10x2    Hook curl 10x2    Joint blocking MP, PIP, DIP     Fluidotherapy with digital and wrist AROM (7 mins)    Composite fist rolls 20x1    Median nerve glides 10x1    Joint blocking MP, PIP, DIP 10x2   Fluidotherapy with digital and wrist AROM (7 mins)    Composite fist rolls 20x1    Digital adduction 20x1    Joint blocking MP, PIP, DIP 10x2    Fabrication of PIP flexion relative motion orthosis (5 mins) Fluidotherapy with digital and wrist AROM (7 mins)    Composite fist rolls 20x1    Digital adduction 20x1    Yellow putty ex:   5x3  -FDP glides  -lumbrical squeeze  -opposition  -adduction Paraffin with prolonged stretch of the R SF into flexion using coband (5 mins)    Composite fist rolls 20x1    Joint blocking PIP 20x1    Red putty ex: 10x2  -  -lumbrical squeeze  -opposition  -adduction     Paraffin with prolonged stretch of the R SF into flexion using coband (5 mins)    Composite flexion 20x2    Blue Web ex  -  -lumbrical squeeze  10x2    Yellow Flexbar 6# 10x2  -bilateral supination         Paraffin with prolonged stretch of the R SF into flexion using coband (5  mins)    Composite flexion 20x2    PIP flexion with orthosis 20x1    [place] cupping into thumb<>palm translation (8 mins)         Paraffin with prolonged stretch of the R SF into flexion using coband (5 mins)    Composite flexion 20x2    Table top 10x2    Hook curl 10x2    Full arm median nerve glides 10x1    Red Putty Ex:  -FDS/FDP glides 10x1  -tip pinch SF and thumb 10x2     Paraffin with prolonged stretch of the R SF into flexion using coband (5 mins)    Composite flexion 20x2    Red Putty Ex:  -  -adduction  -opposition  -digital extension  10x3 Paraffin with prolonged stretch of the R SF into flexion using coband (5 mins)    Composite flexion 20x2    [place] cupping into thumb<>palm translation (8 mins)    Blue Web Ex:   -lumbrical squeeze  10x2    Rubber band ex  -lumbrical extension  -abduction  10x2 Paraffin with prolonged stretch of the R SF into flexion using coband (5 mins)    Composite flexion 20x2    Digital abd/aduction 10x2    Blue Web Ex:   -lumbrical squeeze  -Lumbrical extension  12x2          Prolonged flexion stretch orthosis fabrication initiation Paraffin with prolonged stretch of the R SF into flexion using coband (5 mins)    Joint blocking MP, PIP, DIP 10x2    Median nerve glides 10x2    Digital abd/aduction 10x2    Blue Web Ex:   -lumbrical squeeze  -Lumbrical extension  12x2    Low load Prolonged flexion stretch orthosis fabrication initiation completed Paraffin with prolonged stretch of the R SF into flexion using coband (5 mins)    Red putty  -FDP glides of SF into full composite FDP glides 10x2    Hook curls 10x3    Blue Web Ex:   -lumbrical squeeze  15x2 Paraffin with prolonged stretch of the R SF into flexion using coband (5 mins)    SF flexion and holds 10x3    Hook curls 10x3    Rubber band SF abd/adduction 10x3    SF rubber band extension 10x3    Relative motion orthosis PIP/DIP flexion 10x3    Blue Web    HEP instruction                          Therapeutic Activity                                      Adduction of RF and SF sponge lifts and transports (5 mins)    Composite flexion cone grasp and pull of marbles across the table (10 marbles) Adduction of RF and SF large peg lifts and transports (5 mins)       Composite flexion cone grasp and stack (9 cones) 3 times             Neuromuscular Re-education                                                             Modalities                                 Moist Heat applied to digit (3 mins)                          HEP:  Assignment date:   tendon glides, flexion joint blocking, and digital adduction 11/05/24   Median nerve glides 11/18/24           Access Code: [email or web address]  URL: [email or web address]  Date: 12/23/2024  Prepared by: Julissa TAYLOR MS, OTR/L    Exercises  - Putty Squeezes  - 2 x daily - 7 x weekly - 2 sets - 10 reps  - Thumb Opposition with Putty  - 2 x daily - 7 x weekly - 2 sets - 10 reps  - Finger Adduction with Putty  - 2 x daily - 7 x weekly - 2 sets - 10 reps  - Key Pinch with Putty  - 2 x daily - 7 x weekly - 2 sets - 10 reps      Charges: TE 3  Total Timed Treatment: 40 min  Total Treatment Time: 40 min

## 2025-01-20 ENCOUNTER — OFFICE VISIT (OUTPATIENT)
Dept: OCCUPATIONAL MEDICINE | Facility: HOSPITAL | Age: 40
End: 2025-01-20
Attending: STUDENT IN AN ORGANIZED HEALTH CARE EDUCATION/TRAINING PROGRAM
Payer: COMMERCIAL

## 2025-01-20 PROCEDURE — 97110 THERAPEUTIC EXERCISES: CPT

## 2025-01-20 PROCEDURE — 97112 NEUROMUSCULAR REEDUCATION: CPT

## 2025-01-20 NOTE — PROGRESS NOTES
Diagnosis:   Orthopedic aftercare (Z47.89)       Referring Provider: Luis Mayfield  Date of Evaluation:    11/5/2024    Precautions:  following [place] Hand Protocols Next MD visit:   none scheduled  Date of Surgery: 09/25/24   Insurance Primary/Secondary: BCBS OUT OF STATE / N/A     # Auth Visits: 20 visits 10/11-12/31               Subjective: Pt states feeling stiff with the cold.     Pain: 0/10      Objective: See exercises flowsheet below for exercises and activities performed today. All exercises performed bilaterally.    RIGHT   SF Eval  SF 11/21/2024 SF 12/5/2024 SF 12/23/2024  SF 1/13/2025   MP 0/35 0/60 0/65 0/65 0/65   PIP -24/25 -15/25 -20/25 -15/30 -15/30   DIP 0/0 0/5 0/5 0/10 0/15   BERGERON 36 75 75 90 95       Assessment: PROM at the PIP more mobile than prior sessions. AROM appears to be more limited d/t FDP and FDS gliding. NMES initiated today post manual IASTM to volar R SF.  Pt tender to manual work over volar DIP joint. With NMES pt able to achieve 50% of a full flexion of the R SF.       Goals: 2x/week or a total of 18 visits   Pt will be independent and compliant with comprehensive HEP to maintain progress achieved in OT-MET, updated   Pt will decrease their (R) SF P1 edema by 0.5 cm or more for ease of grasping utensils.-MET  Pt will decrease their (R) MCP edema by 0.5 cm or more for ease of holding steering wheel-MET  Pt will increase their (R) wrist flexion and extension by 10 degrees for ease of dressing-MET  Pt will increase their (R) RF BERGERON to at least 200 degrees for ease of holding cup.-Progressing  Pt will increase their (R) SF BERGERON to at least 200  degrees for ease of cupping medications.-Progressing  Pt will decrease their QuickDASH score by 10% in order to demo improvements in functional independence.-NT   Updated/Additional Goals:  Pt will increase their (R)  strength to at least 25# for ease of holding bow for hunting.-Progressing  Pt will increase their (R) pinch strength scores  by 3-5# for ease of opening bottles and jars.-Progressing    Plan: Continue manual pre NMES  Date 11/7/2024   11/18/2024   11/21/2024 11/25/2024   12/3/2024  12/5/2024  12/12/2024  12/17/2024  12/23/2024  12/31/2024  1/7/2025  1/9/2025  1/13/2025  1/15/2025  1/20/2025    Visit # 2  3  4  5  6  7  8 9 10 11 12 13 14 15 16                     Progress Note          Evaluation               8 more requested         Manual                                       PROM into composite flexion PROM into composite flexion 10x1, 10-20 sec holds PROM into composite flexion 10x1, 10-20 sec holds PROM into composite flexion 10x1, 10-20 sec holds    IASTM to volar palm and R SF (5 mins)   Tuning fork vibration scar massage (5 mins)   PROM with 10-20 second holds PROM with 10-20 second holds   IASTM to volar R SF (5 mins)   Ther ex                                         Digital adduction 10x2    Intrinsic plus position 10x2    Hook curl 10x2    Joint blocking MP, PIP, DIP     Fluidotherapy with digital and wrist AROM (7 mins)    Composite fist rolls 20x1    Median nerve glides 10x1    Joint blocking MP, PIP, DIP 10x2   Fluidotherapy with digital and wrist AROM (7 mins)    Composite fist rolls 20x1    Digital adduction 20x1    Joint blocking MP, PIP, DIP 10x2    Fabrication of PIP flexion relative motion orthosis (5 mins) Fluidotherapy with digital and wrist AROM (7 mins)    Composite fist rolls 20x1    Digital adduction 20x1    Yellow putty ex:   5x3  -FDP glides  -lumbrical squeeze  -opposition  -adduction Paraffin with prolonged stretch of the R SF into flexion using coband (5 mins)    Composite fist rolls 20x1    Joint blocking PIP 20x1    Red putty ex: 10x2  -  -lumbrical squeeze  -opposition  -adduction     Paraffin with prolonged stretch of the R SF into flexion using coband (5 mins)    Composite flexion 20x2    Blue Web ex  -  -lumbrical squeeze  10x2    Yellow Flexbar 6# 10x2  -bilateral supination         Paraffin  with prolonged stretch of the R SF into flexion using coband (5 mins)    Composite flexion 20x2    PIP flexion with orthosis 20x1    [place] cupping into thumb<>palm translation (8 mins)         Paraffin with prolonged stretch of the R SF into flexion using coband (5 mins)    Composite flexion 20x2    Table top 10x2    Hook curl 10x2    Full arm median nerve glides 10x1    Red Putty Ex:  -FDS/FDP glides 10x1  -tip pinch SF and thumb 10x2     Paraffin with prolonged stretch of the R SF into flexion using coband (5 mins)    Composite flexion 20x2    Red Putty Ex:  -  -adduction  -opposition  -digital extension  10x3 Paraffin with prolonged stretch of the R SF into flexion using coband (5 mins)    Composite flexion 20x2    [place] cupping into thumb<>palm translation (8 mins)    Blue Web Ex:   -lumbrical squeeze  10x2    Rubber band ex  -lumbrical extension  -abduction  10x2 Paraffin with prolonged stretch of the R SF into flexion using coband (5 mins)    Composite flexion 20x2    Digital abd/aduction 10x2    Blue Web Ex:   -lumbrical squeeze  -Lumbrical extension  12x2          Prolonged flexion stretch orthosis fabrication initiation Paraffin with prolonged stretch of the R SF into flexion using coband (5 mins)    Joint blocking MP, PIP, DIP 10x2    Median nerve glides 10x2    Digital abd/aduction 10x2    Blue Web Ex:   -lumbrical squeeze  -Lumbrical extension  12x2    Low load Prolonged flexion stretch orthosis fabrication initiation completed Paraffin with prolonged stretch of the R SF into flexion using coband (5 mins)    Red putty  -FDP glides of SF into full composite FDP glides 10x2    Hook curls 10x3    Blue Web Ex:   -lumbrical squeeze  15x2 Paraffin with prolonged stretch of the R SF into flexion using coband (5 mins)    SF flexion and holds 10x3    Hook curls 10x3    Rubber band SF abd/adduction 10x3    SF rubber band extension 10x3    Relative motion orthosis PIP/DIP flexion 10x3    Blue Web  Paraffin  with prolonged stretch of the R SF into flexion using coband (5 mins)    SF flexion and holds 10x3    Rubber band SF abd/adduction 10x3    SF rubber band extension 10x3    SF rubber band flexion 10x2    Wooden block stacking of 3s into cupping into palm for SF activation into flexion (5 mins)   HEP instruction                           Therapeutic Activity                                      Adduction of RF and SF sponge lifts and transports (5 mins)    Composite flexion cone grasp and pull of marbles across the table (10 marbles) Adduction of RF and SF large peg lifts and transports (5 mins)       Composite flexion cone grasp and stack (9 cones) 3 times              Neuromuscular Re-education                                                            NMES to forearm flexor for digital:   Pulse Duration: 150  Freq: 35 pps   Amplitude: 15  On/off time: 10/10  Duration: 8 mins     Modalities                                  Moist Heat applied to digit (3 mins)                           HEP:  Assignment date:   tendon glides, flexion joint blocking, and digital adduction 11/05/24   Median nerve glides 11/18/24           Access Code: [email or web address]  URL: [email or web address]  Date: 12/23/2024  Prepared by: Julissa TAYLOR MS, OTR/L    Exercises  - Putty Squeezes  - 2 x daily - 7 x weekly - 2 sets - 10 reps  - Thumb Opposition with Putty  - 2 x daily - 7 x weekly - 2 sets - 10 reps  - Finger Adduction with Putty  - 2 x daily - 7 x weekly - 2 sets - 10 reps  - Key Pinch with Putty  - 2 x daily - 7 x weekly - 2 sets - 10 reps      Charges: NMRE 1 (8), TE 2 (34)  Total Timed Treatment: 42 min  Total Treatment Time: 42 min

## 2025-01-22 ENCOUNTER — OFFICE VISIT (OUTPATIENT)
Dept: OCCUPATIONAL MEDICINE | Facility: HOSPITAL | Age: 40
End: 2025-01-22
Attending: STUDENT IN AN ORGANIZED HEALTH CARE EDUCATION/TRAINING PROGRAM
Payer: COMMERCIAL

## 2025-01-22 PROCEDURE — 97112 NEUROMUSCULAR REEDUCATION: CPT

## 2025-01-22 PROCEDURE — 97110 THERAPEUTIC EXERCISES: CPT

## 2025-01-22 NOTE — PROGRESS NOTES
Diagnosis:   Orthopedic aftercare (Z47.89)       Referring Provider: Luis Mayfield  Date of Evaluation:    11/5/2024    Precautions:  following [place] Hand Protocols Next MD visit:   none scheduled  Date of Surgery: 09/25/24   Insurance Primary/Secondary: BCBS OUT OF STATE / N/A     # Auth Visits: 20 visits 10/11-12/31               Subjective: Pt states numbness persist with activities    Pain: 0/10      Objective: See exercises flowsheet below for exercises and activities performed today. All exercises performed bilaterally.    RIGHT   SF Eval  SF 11/21/2024 SF 12/5/2024 SF 12/23/2024  SF 1/13/2025   MP 0/35 0/60 0/65 0/65 0/65   PIP -24/25 -15/25 -20/25 -15/30 -15/30   DIP 0/0 0/5 0/5 0/10 0/15   BERGERON 36 75 75 90 95       Assessment: Continued NMES to digital flexors. Pt continues to have STR within the FDS and FDP impacting composite flexion.     Goals: 2x/week or a total of 18 visits   Pt will be independent and compliant with comprehensive HEP to maintain progress achieved in OT-MET, updated   Pt will decrease their (R) SF P1 edema by 0.5 cm or more for ease of grasping utensils.-MET  Pt will decrease their (R) MCP edema by 0.5 cm or more for ease of holding steering wheel-MET  Pt will increase their (R) wrist flexion and extension by 10 degrees for ease of dressing-MET  Pt will increase their (R) RF BERGERON to at least 200 degrees for ease of holding cup.-Progressing  Pt will increase their (R) SF BERGERON to at least 200  degrees for ease of cupping medications.-Progressing  Pt will decrease their QuickDASH score by 10% in order to demo improvements in functional independence.-NT   Updated/Additional Goals:  Pt will increase their (R)  strength to at least 25# for ease of holding bow for hunting.-Progressing  Pt will increase their (R) pinch strength scores by 3-5# for ease of opening bottles and jars.-Progressing    Plan: Continue manual pre NMES  Date 11/7/2024   11/18/2024   11/21/2024 11/25/2024   12/3/2024   12/5/2024  12/12/2024  12/17/2024  12/23/2024  12/31/2024  1/7/2025  1/9/2025  1/13/2025  1/15/2025  1/20/2025  1/22/2025    Visit # 2  3  4  5  6  7  8 9 10 11 12 13 14 15 16 17                     Progress Note           Evaluation               8 more requested          Manual                                        PROM into composite flexion PROM into composite flexion 10x1, 10-20 sec holds PROM into composite flexion 10x1, 10-20 sec holds PROM into composite flexion 10x1, 10-20 sec holds    IASTM to volar palm and R SF (5 mins)   Tuning fork vibration scar massage (5 mins)   PROM with 10-20 second holds PROM with 10-20 second holds   IASTM to volar R SF (5 mins) IASTM to volar R SF (5 mins)   Ther ex                                          Digital adduction 10x2    Intrinsic plus position 10x2    Hook curl 10x2    Joint blocking MP, PIP, DIP     Fluidotherapy with digital and wrist AROM (7 mins)    Composite fist rolls 20x1    Median nerve glides 10x1    Joint blocking MP, PIP, DIP 10x2   Fluidotherapy with digital and wrist AROM (7 mins)    Composite fist rolls 20x1    Digital adduction 20x1    Joint blocking MP, PIP, DIP 10x2    Fabrication of PIP flexion relative motion orthosis (5 mins) Fluidotherapy with digital and wrist AROM (7 mins)    Composite fist rolls 20x1    Digital adduction 20x1    Yellow putty ex:   5x3  -FDP glides  -lumbrical squeeze  -opposition  -adduction Paraffin with prolonged stretch of the R SF into flexion using coband (5 mins)    Composite fist rolls 20x1    Joint blocking PIP 20x1    Red putty ex: 10x2  -  -lumbrical squeeze  -opposition  -adduction     Paraffin with prolonged stretch of the R SF into flexion using coband (5 mins)    Composite flexion 20x2    Blue Web ex  -  -lumbrical squeeze  10x2    Yellow Flexbar 6# 10x2  -bilateral supination         Paraffin with prolonged stretch of the R SF into flexion using coband (5 mins)    Composite flexion 20x2    PIP flexion  with orthosis 20x1    [place] cupping into thumb<>palm translation (8 mins)         Paraffin with prolonged stretch of the R SF into flexion using coband (5 mins)    Composite flexion 20x2    Table top 10x2    Hook curl 10x2    Full arm median nerve glides 10x1    Red Putty Ex:  -FDS/FDP glides 10x1  -tip pinch SF and thumb 10x2     Paraffin with prolonged stretch of the R SF into flexion using coband (5 mins)    Composite flexion 20x2    Red Putty Ex:  -  -adduction  -opposition  -digital extension  10x3 Paraffin with prolonged stretch of the R SF into flexion using coband (5 mins)    Composite flexion 20x2    [place] cupping into thumb<>palm translation (8 mins)    Blue Web Ex:   -lumbrical squeeze  10x2    Rubber band ex  -lumbrical extension  -abduction  10x2 Paraffin with prolonged stretch of the R SF into flexion using coband (5 mins)    Composite flexion 20x2    Digital abd/aduction 10x2    Blue Web Ex:   -lumbrical squeeze  -Lumbrical extension  12x2          Prolonged flexion stretch orthosis fabrication initiation Paraffin with prolonged stretch of the R SF into flexion using coband (5 mins)    Joint blocking MP, PIP, DIP 10x2    Median nerve glides 10x2    Digital abd/aduction 10x2    Blue Web Ex:   -lumbrical squeeze  -Lumbrical extension  12x2    Low load Prolonged flexion stretch orthosis fabrication initiation completed Paraffin with prolonged stretch of the R SF into flexion using coband (5 mins)    Red putty  -FDP glides of SF into full composite FDP glides 10x2    Hook curls 10x3    Blue Web Ex:   -lumbrical squeeze  15x2 Paraffin with prolonged stretch of the R SF into flexion using coband (5 mins)    SF flexion and holds 10x3    Hook curls 10x3    Rubber band SF abd/adduction 10x3    SF rubber band extension 10x3    Relative motion orthosis PIP/DIP flexion 10x3    Blue Web  Paraffin with prolonged stretch of the R SF into flexion using coband (5 mins)    SF flexion and holds 10x3    Rubber band  SF abd/adduction 10x3    SF rubber band extension 10x3    SF rubber band flexion 10x2    Wooden block stacking of 3s into cupping into palm for SF activation into flexion (5 mins) Paraffin with prolonged stretch of the R SF into flexion using coband (5 mins)    SF flexion and holds 10x3    PIP and DIP joint flexion blocking 10x2    Rubber band SF abd/adduction 10x3    SF rubber band extension 10x3    Black Web Ex:   -lumbrical squeeze  15x2    3# digi-flex 10x2 iso-digital flexion    HEP instruction                            Therapeutic Activity                                       Adduction of RF and SF sponge lifts and transports (5 mins)    Composite flexion cone grasp and pull of marbles across the table (10 marbles) Adduction of RF and SF large peg lifts and transports (5 mins)       Composite flexion cone grasp and stack (9 cones) 3 times               Neuromuscular Re-education                                                             NMES to forearm flexor for digital:   Pulse Duration: 150  Freq: 35 pps   Amplitude: 15  On/off time: 10/10  Duration: 8 mins   NMES to forearm flexor for digital:   Pulse Duration: 150  Freq: 35 pps   Amplitude: 15  On/off time: 10/10  Duration: 8 mins   Modalities                                   Moist Heat applied to digit (3 mins)                            HEP:  Assignment date:   tendon glides, flexion joint blocking, and digital adduction 11/05/24   Median nerve glides 11/18/24           Access Code: [email or web address]  URL: [email or web address]  Date: 12/23/2024  Prepared by: Julissa TAYLOR MS, OTR/L    Exercises  - Putty Squeezes  - 2 x daily - 7 x weekly - 2 sets - 10 reps  - Thumb Opposition with Putty  - 2 x daily - 7 x weekly - 2 sets - 10 reps  - Finger Adduction with Putty  - 2 x daily - 7 x weekly - 2 sets - 10 reps  - Key Pinch with Putty  - 2 x daily - 7 x weekly - 2 sets - 10 reps      Charges: NMRE 1 (8), TE 2 (32)  Total Timed Treatment: 40  min  Total Treatment Time: 40 min

## 2025-01-29 ENCOUNTER — OFFICE VISIT (OUTPATIENT)
Dept: OCCUPATIONAL MEDICINE | Facility: HOSPITAL | Age: 40
End: 2025-01-29
Attending: STUDENT IN AN ORGANIZED HEALTH CARE EDUCATION/TRAINING PROGRAM
Payer: COMMERCIAL

## 2025-01-29 PROCEDURE — 97110 THERAPEUTIC EXERCISES: CPT

## 2025-01-29 NOTE — PROGRESS NOTES
Diagnosis:   Orthopedic aftercare (Z47.89)       Referring Provider: Luis Mayfield  Date of Evaluation:    11/5/2024    Precautions:  following [place] Hand Protocols Next MD visit:   none scheduled  Date of Surgery: 09/25/24   Insurance Primary/Secondary: BCBS OUT OF STATE / N/A     # Auth Visits: 20 visits 10/11-12/31                 Progress Summary  Pt has attended 18 visits in Occupational Therapy.     Subjective: Pt states numbness continues to be a big concern. Numbness most prevalent in her thumb and IF of her R hand.     Assessment: Pt has been seen for 18 skilled visits. Measurements taken today. Pt met her AROM of her R RF and improved in her SF. Pt gain strength in all areas as well, however, remains weak for her age norm. Goals updated to reflect progress. Pt would benefit from continued skilled OT to address strength and AROM deficits impacting functioning and to monitor numbness symptoms.      Objective Data:     Right   SF Eval  SF 11/21/2024 SF 12/5/2024 SF 12/23/2024  SF 1/13/2025 SF 1/29/2025   MP 0/35 0/60 0/65 0/65 0/65 0/70   PIP -24/25 -15/25 -20/25 -15/30 -15/30 -20/35   DIP 0/0 0/5 0/5 0/10 0/15 0/25   BERGERON 36 75 75 90 95 110     RIGHT HAND:    RF eval RF 11/21/2024 RF 12/23/2024  RF 1/29/2025   MP 0/30 0/50 0/65 0/80   PIP 0/70 0/76 0/85 0/85   DIP 0/15 0/30 0/40 0/40   BERGERON 115 156 190 205       Strength (lbs) Right Average 11/21/24 Right Average 12/23/2024 Right Average 1/29/2025   : 9 16 29    2 pt Pinch: 5 5 6 (+1)   3 pt Pinch: 5 6 9 (+3)   Lateral Pinch: 5 9 (+4) 11 (+2)       Goals:   Pt will be independent and compliant with comprehensive HEP to maintain progress achieved in OT-MET  Pt will decrease their (R) SF P1 edema by 0.5 cm or more for ease of grasping utensils.-MET  Pt will decrease their (R) MCP edema by 0.5 cm or more for ease of holding steering wheel-MET  Pt will increase their (R) wrist flexion and extension by 10 degrees for ease of dressing-MET  Pt will increase  their (R) RF BERGERON to at least 200 degrees for ease of holding cup.-MET 1/29/2025  Pt will increase their (R) SF BERGERON to at least 200  degrees for ease of cupping medications.-Progressed  Pt will decrease their QuickDASH score by 10% in order to demo improvements in functional independence.-NT   Updated/Additional Goals:  Pt will increase their (R)  strength to at least 25# for ease of holding bow for hunting.-MET 1/29/2025  Pt will increase their (R) pinch strength scores by 3-5# for ease of opening bottles and jars.-Progressing  Updated 1/29/2025:   Pt will increase their (R)  strength to at least 35# for ease of holding bow for hunting.-      Rehab Potential: good    Plan: Continue skilled Occupational Therapy 1x/week or a total of 4 visits over a 90 day period. Treatment will include: Manual Therapy, Soft tissue mobilization, AROM, PROM, Strengthening, Therapeutic Activity, Moist heat, cryotherapy, Ultrasound, Whirlpool (fluidotherapy), Paraffin, Electrical Stim, Orthosis,  Neuromuscular Re-education, Patient education, Home exercise program,           Patient/Family/Caregiver was advised of these findings, precautions, and treatment options and has agreed to actively participate in planning and for this course of care.    Thank you for your referral. If you have any questions, please contact me at Dept: [phone].    Sincerely,  Electronically signed by therapist: Julissa Tidwell OT    Physician's certification required: Yes  Please co-sign or sign and return this letter via fax as soon as possible to [phone].   I certify the need for these services furnished under this plan of treatment and while under my care.    X___________________________________________________ Date____________________    Certification From: 1/29/2025  To:4/29/2025                      Subjective: See progress note    Pain: 0/10      Objective: See exercises flowsheet below for exercises and activities performed today. All  exercises performed bilaterally.      Assessment: Paraffin and NMES deferred today.    Goals: 2x/week or a total of 18 visits   Pt will be independent and compliant with comprehensive HEP to maintain progress achieved in OT-MET, updated   Pt will decrease their (R) SF P1 edema by 0.5 cm or more for ease of grasping utensils.-MET  Pt will decrease their (R) MCP edema by 0.5 cm or more for ease of holding steering wheel-MET  Pt will increase their (R) wrist flexion and extension by 10 degrees for ease of dressing-MET  Pt will increase their (R) RF BERGERON to at least 200 degrees for ease of holding cup.-Progressing  Pt will increase their (R) SF BERGERON to at least 200  degrees for ease of cupping medications.-Progressing  Pt will decrease their QuickDASH score by 10% in order to demo improvements in functional independence.-NT   Updated/Additional Goals:  Pt will increase their (R)  strength to at least 25# for ease of holding bow for hunting.-Progressing  Pt will increase their (R) pinch strength scores by 3-5# for ease of opening bottles and jars.-Progressing    Plan: See progress note  Date 11/7/2024   11/18/2024   11/21/2024 11/25/2024   12/3/2024  12/5/2024  12/12/2024  12/17/2024  12/23/2024  12/31/2024  1/7/2025  1/9/2025  1/13/2025  1/15/2025  1/20/2025  1/22/2025  1/29/2025    Visit # 2  3  4  5  6  7  8 9 10 11 12 13 14 15 16 17 18                     Progress Note            Evaluation               8 more requested           Manual                                         PROM into composite flexion PROM into composite flexion 10x1, 10-20 sec holds PROM into composite flexion 10x1, 10-20 sec holds PROM into composite flexion 10x1, 10-20 sec holds    IASTM to volar palm and R SF (5 mins)   Tuning fork vibration scar massage (5 mins)   PROM with 10-20 second holds PROM with 10-20 second holds   IASTM to volar R SF (5 mins) IASTM to volar R SF (5 mins)    Ther ex                                           Digital  adduction 10x2    Intrinsic plus position 10x2    Hook curl 10x2    Joint blocking MP, PIP, DIP     Fluidotherapy with digital and wrist AROM (7 mins)    Composite fist rolls 20x1    Median nerve glides 10x1    Joint blocking MP, PIP, DIP 10x2   Fluidotherapy with digital and wrist AROM (7 mins)    Composite fist rolls 20x1    Digital adduction 20x1    Joint blocking MP, PIP, DIP 10x2    Fabrication of PIP flexion relative motion orthosis (5 mins) Fluidotherapy with digital and wrist AROM (7 mins)    Composite fist rolls 20x1    Digital adduction 20x1    Yellow putty ex:   5x3  -FDP glides  -lumbrical squeeze  -opposition  -adduction Paraffin with prolonged stretch of the R SF into flexion using coband (5 mins)    Composite fist rolls 20x1    Joint blocking PIP 20x1    Red putty ex: 10x2  -  -lumbrical squeeze  -opposition  -adduction     Paraffin with prolonged stretch of the R SF into flexion using coband (5 mins)    Composite flexion 20x2    Blue Web ex  -  -lumbrical squeeze  10x2    Yellow Flexbar 6# 10x2  -bilateral supination         Paraffin with prolonged stretch of the R SF into flexion using coband (5 mins)    Composite flexion 20x2    PIP flexion with orthosis 20x1    [place] cupping into thumb<>palm translation (8 mins)         Paraffin with prolonged stretch of the R SF into flexion using coband (5 mins)    Composite flexion 20x2    Table top 10x2    Hook curl 10x2    Full arm median nerve glides 10x1    Red Putty Ex:  -FDS/FDP glides 10x1  -tip pinch SF and thumb 10x2     Paraffin with prolonged stretch of the R SF into flexion using coband (5 mins)    Composite flexion 20x2    Red Putty Ex:  -  -adduction  -opposition  -digital extension  10x3 Paraffin with prolonged stretch of the R SF into flexion using coband (5 mins)    Composite flexion 20x2    [place] cupping into thumb<>palm translation (8 mins)    Blue Web Ex:   -lumbrical squeeze  10x2    Rubber band ex  -lumbrical  extension  -abduction  10x2 Paraffin with prolonged stretch of the R SF into flexion using coband (5 mins)    Composite flexion 20x2    Digital abd/aduction 10x2    Blue Web Ex:   -lumbrical squeeze  -Lumbrical extension  12x2          Prolonged flexion stretch orthosis fabrication initiation Paraffin with prolonged stretch of the R SF into flexion using coband (5 mins)    Joint blocking MP, PIP, DIP 10x2    Median nerve glides 10x2    Digital abd/aduction 10x2    Blue Web Ex:   -lumbrical squeeze  -Lumbrical extension  12x2    Low load Prolonged flexion stretch orthosis fabrication initiation completed Paraffin with prolonged stretch of the R SF into flexion using coband (5 mins)    Red putty  -FDP glides of SF into full composite FDP glides 10x2    Hook curls 10x3    Blue Web Ex:   -lumbrical squeeze  15x2 Paraffin with prolonged stretch of the R SF into flexion using coband (5 mins)    SF flexion and holds 10x3    Hook curls 10x3    Rubber band SF abd/adduction 10x3    SF rubber band extension 10x3    Relative motion orthosis PIP/DIP flexion 10x3    Blue Web  Paraffin with prolonged stretch of the R SF into flexion using coband (5 mins)    SF flexion and holds 10x3    Rubber band SF abd/adduction 10x3    SF rubber band extension 10x3    SF rubber band flexion 10x2    Wooden block stacking of 3s into cupping into palm for SF activation into flexion (5 mins) Paraffin with prolonged stretch of the R SF into flexion using coband (5 mins)    SF flexion and holds 10x3    PIP and DIP joint flexion blocking 10x2    Rubber band SF abd/adduction 10x3    SF rubber band extension 10x3    Black Web Ex:   -lumbrical squeeze  15x2    3# digi-flex 10x2 iso-digital flexion  Tendon glides 10x2    Rubber band SF abd/adduction 10x3    SF rubber band extension 10x3  Rubber band     Black Web Ex:   -lumbrical squeeze  15x2    Green pin velcro block removal with composite flexion    Red pin thumb and SF pinch removal of velcro  blocks    Red flexbar 10#   -bilateral sup/pro 10x2    Red flexbar 10#   - and bends into \"T\" shape   10x2   HEP instruction                             Therapeutic Activity                                        Adduction of RF and SF sponge lifts and transports (5 mins)    Composite flexion cone grasp and pull of marbles across the table (10 marbles) Adduction of RF and SF large peg lifts and transports (5 mins)       Composite flexion cone grasp and stack (9 cones) 3 times                Neuromuscular Re-education                                                              NMES to forearm flexor for digital:   Pulse Duration: 150  Freq: 35 pps   Amplitude: 15  On/off time: 10/10  Duration: 8 mins   NMES to forearm flexor for digital:   Pulse Duration: 150  Freq: 35 pps   Amplitude: 15  On/off time: 10/10  Duration: 8 mins    Modalities                                    Moist Heat applied to digit (3 mins)                             HEP:  Assignment date:   tendon glides, flexion joint blocking, and digital adduction 11/05/24   Median nerve glides 11/18/24           Access Code: [email or web address]  URL: [email or web address]  Date: 12/23/2024  Prepared by: Julissa TAYLOR MS, OTR/L    Exercises  - Putty Squeezes  - 2 x daily - 7 x weekly - 2 sets - 10 reps  - Thumb Opposition with Putty  - 2 x daily - 7 x weekly - 2 sets - 10 reps  - Finger Adduction with Putty  - 2 x daily - 7 x weekly - 2 sets - 10 reps  - Key Pinch with Putty  - 2 x daily - 7 x weekly - 2 sets - 10 reps      Charges: TE 3  Total Timed Treatment: 40 min  Total Treatment Time: 45 min

## 2025-02-03 ENCOUNTER — PATIENT MESSAGE (OUTPATIENT)
Dept: INTERNAL MEDICINE CLINIC | Facility: CLINIC | Age: 40
End: 2025-02-03

## 2025-02-03 ENCOUNTER — OFFICE VISIT (OUTPATIENT)
Dept: OCCUPATIONAL MEDICINE | Facility: HOSPITAL | Age: 40
End: 2025-02-03
Attending: STUDENT IN AN ORGANIZED HEALTH CARE EDUCATION/TRAINING PROGRAM
Payer: COMMERCIAL

## 2025-02-03 DIAGNOSIS — S62.616A CLOSED DISPLACED FRACTURE OF PROXIMAL PHALANX OF RIGHT LITTLE FINGER, INITIAL ENCOUNTER: Primary | ICD-10-CM

## 2025-02-03 PROCEDURE — 97110 THERAPEUTIC EXERCISES: CPT

## 2025-02-03 PROCEDURE — 97140 MANUAL THERAPY 1/> REGIONS: CPT

## 2025-02-03 NOTE — PROGRESS NOTES
Diagnosis:   Orthopedic aftercare (Z47.89)       Referring Provider: Luis Mayfield  Date of Evaluation:    11/5/2024    Precautions:  following [place] Hand Protocols Next MD visit:   none scheduled  Date of Surgery: 09/25/24   Insurance Primary/Secondary: [employer] OUT OF STATE / N/A     # Auth Visits: 20 visits 10/11-12/31             Subjective: Pt unable to see the doctor d/t insurance renewal. Pt will be seeing doctor this Friday.    Pain: 0/10      Objective: See exercises flowsheet below for exercises and activities performed today. All exercises performed bilaterally.      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 STR most notable over P1. Minimal gliding at FDP, however, pt able to demo enough strength to use green more resistant putty.     Goals: 1x/week or a total of 4 visits   Pt will be independent and compliant with comprehensive HEP to maintain progress achieved in OT-MET  Pt will decrease their (R) SF P1 edema by 0.5 cm or more for ease of grasping utensils.-MET  Pt will decrease their (R) MCP edema by 0.5 cm or more for ease of holding steering wheel-MET  Pt will increase their (R) wrist flexion and extension by 10 degrees for ease of dressing-MET  Pt will increase their (R) RF BERGERON to at least 200 degrees for ease of holding cup.-MET 1/29/2025  Pt will increase their (R) SF BERGERON to at least 200  degrees for ease of cupping medications.-Progressed  Pt will decrease their QuickDASH score by 10% in order to demo improvements in functional independence.-NT   Updated/Additional Goals:  Pt will increase their (R)  strength to at least 25# for ease of holding bow for hunting.-MET 1/29/2025  Pt will increase their (R) pinch strength scores by 3-5# for ease of opening bottles and jars.-Progressing  Updated 1/29/2025:   Pt will increase their (R)  strength to at least 35# for ease of holding bow for hunting.      Plan: Continue work with green  putty  Date 11/7/2024   11/18/2024   11/21/2024 11/25/2024   12/3/2024  12/5/2024  12/12/2024  12/17/2024  12/23/2024  12/31/2024  1/7/2025  1/9/2025  1/13/2025  1/15/2025  1/20/2025  1/22/2025  1/29/2025  2/3/2025    Visit # 2  3  4  5  6  7  8 9 10 11 12 13 14 15 16 17 18 1/4 additional visit                     Progress Note             Evaluation               8 more requested            Manual                                          PROM into composite flexion PROM into composite flexion 10x1, 10-20 sec holds PROM into composite flexion 10x1, 10-20 sec holds PROM into composite flexion 10x1, 10-20 sec holds    IASTM to volar palm and R SF (5 mins)   Tuning fork vibration scar massage (5 mins)   PROM with 10-20 second holds PROM with 10-20 second holds   IASTM to volar R SF (5 mins) IASTM to volar R SF (5 mins)  IASTM to volar R SF   Ther ex                                            Digital adduction 10x2    Intrinsic plus position 10x2    Hook curl 10x2    Joint blocking MP, PIP, DIP     Fluidotherapy with digital and wrist AROM (7 mins)    Composite fist rolls 20x1    Median nerve glides 10x1    Joint blocking MP, PIP, DIP 10x2   Fluidotherapy with digital and wrist AROM (7 mins)    Composite fist rolls 20x1    Digital adduction 20x1    Joint blocking MP, PIP, DIP 10x2    Fabrication of PIP flexion relative motion orthosis (5 mins) Fluidotherapy with digital and wrist AROM (7 mins)    Composite fist rolls 20x1    Digital adduction 20x1    Yellow putty ex:   5x3  -FDP glides  -lumbrical squeeze  -opposition  -adduction Paraffin with prolonged stretch of the R SF into flexion using coband (5 mins)    Composite fist rolls 20x1    Joint blocking PIP 20x1    Red putty ex: 10x2  -  -lumbrical squeeze  -opposition  -adduction     Paraffin with prolonged stretch of the R SF into flexion using coband (5 mins)    Composite flexion 20x2    Blue Web ex  -  -lumbrical squeeze  10x2    Yellow Flexbar 6#  10x2  -bilateral supination         Paraffin with prolonged stretch of the R SF into flexion using coband (5 mins)    Composite flexion 20x2    PIP flexion with orthosis 20x1    [place] cupping into thumb<>palm translation (8 mins)         Paraffin with prolonged stretch of the R SF into flexion using coband (5 mins)    Composite flexion 20x2    Table top 10x2    Hook curl 10x2    Full arm median nerve glides 10x1    Red Putty Ex:  -FDS/FDP glides 10x1  -tip pinch SF and thumb 10x2     Paraffin with prolonged stretch of the R SF into flexion using coband (5 mins)    Composite flexion 20x2    Red Putty Ex:  -  -adduction  -opposition  -digital extension  10x3 Paraffin with prolonged stretch of the R SF into flexion using coband (5 mins)    Composite flexion 20x2    [place] cupping into thumb<>palm translation (8 mins)    Blue Web Ex:   -lumbrical squeeze  10x2    Rubber band ex  -lumbrical extension  -abduction  10x2 Paraffin with prolonged stretch of the R SF into flexion using coband (5 mins)    Composite flexion 20x2    Digital abd/aduction 10x2    Blue Web Ex:   -lumbrical squeeze  -Lumbrical extension  12x2          Prolonged flexion stretch orthosis fabrication initiation Paraffin with prolonged stretch of the R SF into flexion using coband (5 mins)    Joint blocking MP, PIP, DIP 10x2    Median nerve glides 10x2    Digital abd/aduction 10x2    Blue Web Ex:   -lumbrical squeeze  -Lumbrical extension  12x2    Low load Prolonged flexion stretch orthosis fabrication initiation completed Paraffin with prolonged stretch of the R SF into flexion using coband (5 mins)    Red putty  -FDP glides of SF into full composite FDP glides 10x2    Hook curls 10x3    Blue Web Ex:   -lumbrical squeeze  15x2 Paraffin with prolonged stretch of the R SF into flexion using coband (5 mins)    SF flexion and holds 10x3    Hook curls 10x3    Rubber band SF abd/adduction 10x3    SF rubber band extension 10x3    Relative motion orthosis  PIP/DIP flexion 10x3    Blue Web  Paraffin with prolonged stretch of the R SF into flexion using coband (5 mins)    SF flexion and holds 10x3    Rubber band SF abd/adduction 10x3    SF rubber band extension 10x3    SF rubber band flexion 10x2    Wooden block stacking of 3s into cupping into palm for SF activation into flexion (5 mins) Paraffin with prolonged stretch of the R SF into flexion using coband (5 mins)    SF flexion and holds 10x3    PIP and DIP joint flexion blocking 10x2    Rubber band SF abd/adduction 10x3    SF rubber band extension 10x3    Black Web Ex:   -lumbrical squeeze  15x2    3# digi-flex 10x2 iso-digital flexion  Tendon glides 10x2    Rubber band SF abd/adduction 10x3    SF rubber band extension 10x3  Rubber band     Black Web Ex:   -lumbrical squeeze  15x2    Green pin velcro block removal with composite flexion    Red pin thumb and SF pinch removal of velcro blocks    Red flexbar 10#   -bilateral sup/pro 10x2    Red flexbar 10#   - and bends into \"T\" shape   10x2 Paraffin with prolonged stretch of the R SF into flexion using coband (5 mins)    SF flexion into passive holds 15-20 seconds 5x1    Green Putty Ex:   10x2  -lumbrical squeeze  -opposition (3 rows)  -composite ulnar sided flexion (RF/SF)   HEP instruction                              Therapeutic Activity                                         Adduction of RF and SF sponge lifts and transports (5 mins)    Composite flexion cone grasp and pull of marbles across the table (10 marbles) Adduction of RF and SF large peg lifts and transports (5 mins)       Composite flexion cone grasp and stack (9 cones) 3 times                 Neuromuscular Re-education                                                               NMES to forearm flexor for digital:   Pulse Duration: 150  Freq: 35 pps   Amplitude: 15  On/off time: 10/10  Duration: 8 mins   NMES to forearm flexor for digital:   Pulse Duration: 150  Freq: 35 pps   Amplitude: 15  On/off  time: 10/10  Duration: 8 mins     Modalities                                     Moist Heat applied to digit (3 mins)                              HEP:  Assignment date:   tendon glides, flexion joint blocking, and digital adduction 11/05/24   Median nerve glides 11/18/24           Access Code: [email or web address]  URL: [email or web address]  Date: 12/23/2024  Prepared by: Julissa TAYLOR MS, OTR/L    Exercises  - Putty Squeezes  - 2 x daily - 7 x weekly - 2 sets - 10 reps  - Thumb Opposition with Putty  - 2 x daily - 7 x weekly - 2 sets - 10 reps  - Finger Adduction with Putty  - 2 x daily - 7 x weekly - 2 sets - 10 reps  - Key Pinch with Putty  - 2 x daily - 7 x weekly - 2 sets - 10 reps      Charges: MT 1 (8), TE 2 (34)   Total Timed Treatment: 42 min  Total Treatment Time: 42 min

## 2025-02-07 ENCOUNTER — TELEPHONE (OUTPATIENT)
Dept: ORTHOPEDICS CLINIC | Facility: CLINIC | Age: 40
End: 2025-02-07

## 2025-02-07 ENCOUNTER — OFFICE VISIT (OUTPATIENT)
Dept: ORTHOPEDICS CLINIC | Facility: CLINIC | Age: 40
End: 2025-02-07

## 2025-02-07 VITALS — WEIGHT: 139 LBS | HEIGHT: 57.2 IN | BODY MASS INDEX: 29.99 KG/M2

## 2025-02-07 DIAGNOSIS — M25.641 FINGER STIFFNESS, RIGHT: ICD-10-CM

## 2025-02-07 DIAGNOSIS — G56.01 CARPAL TUNNEL SYNDROME OF RIGHT WRIST: Primary | ICD-10-CM

## 2025-02-07 DIAGNOSIS — S62.616D CLOSED DISPLACED FRACTURE OF PROXIMAL PHALANX OF RIGHT LITTLE FINGER WITH ROUTINE HEALING, SUBSEQUENT ENCOUNTER: ICD-10-CM

## 2025-02-07 NOTE — PATIENT INSTRUCTIONS
PERIPHERAL NERVE COMPRESSION  Dr. Luis Mayfield  PERIPHERAL NERVES  Peripheral nerves act as a connection between your brain and the outside world. These fibers start at your spinal cord and branch off to provide signals that tell your muscles to contract (motor signals) or to convey information from the outside world such as touch, temperature, and pain (sensory signals). While there are many peripheral nerves, there are some that are particularly important and provide function in the upper extremity. The three major peripheral nerves of the hand and their sensory distributions are:  Median Nerve          Ulnar Nerve          Radial Nerve         PERIPHERAL NEUROPATHIES  These peripheral nerves travel from the spine, down the arm to the hand. Along this course, they pass over, under, and through many structures. Occasionally the structures of the arm can change and decrease the available space for nerves to go through. The nerve can also become irritated by repetitive activities and thicken, causing it to take up extra room. When this happens, blood cannot get to parts of the nerve, causing it to become painful and/or less effective at transmitting information. The most common compression neuropathies are carpal tunnel syndrome and cubital tunnel syndrome, though any nerve can be compressed.     Carpal Tunnel Syndrome    The most common site of nerve compression is at the wrist in a place called the carpal tunnel. The median nerve passes through this structure which is made up of the wrist (carpal) bones and a ligament called the transverse carpal ligament which acts as the roof. Besides the median nerve, multiple tendons that move the fingers pass through this tunnel, limiting the available space. When the nerve is compressed here it often causes numbness in the thumb side of the hand and may cause weakness and pain.    Cubital Tunnel Syndrome    The second most common site of nerve compression is at the cubital  tunnel, where the ulnar nerve crosses the elbow joint. This tunnel is made up of bones, ligaments, and tendons and is often called the “funny bone”. When the nerve is compressed here it often causes numbness in the pinky side of the hand.     Subluxating/Snapping Ulnar Nerve  For some patients, the ulnar nerve is not compressed but instead is able to move too much. These patients may notice a sensation of the nerve “snapping” over the inside of the elbow. As the nerve becomes irritated by this movement, patients may experience similar numbness in the pinky side of the hand or may have pain at the elbows.    The Spine    Because peripheral nerves start at the spinal cord, they can be compressed as they exit the spine by the bones and discs of the spine. This may cause very similar symptoms to those of peripheral neuropathies.   Less Common Sites of Compression  While the above sites are by far the most common, nerves can become compressed or irritated in multiple sites. For example, some ganglion cysts can compress nerves and some muscles can compress nerves, which can lead to symptoms becoming worse with activities that use these muscles.    CAUSES OF NERVE COMPRESSION  There are many reasons these nerve issues can develop. There is likely some genetic (hereditary) predisposition toward developing these. Many of these also develop from overuse or repetitive activities such as in the workplace. Others may also develop from sprains, fractures, or other injuries. Other causes include diabetes and thyroid disease.  Patients may also develop carpal tunnel syndrome or other nerve compression during pregnancy. This is in part because the body holds onto extra fluid during pregnancy, resulting in swelling. Notably (and unlike many other causes) this may resolve after pregnancy.     DIAGNOSIS OF NERVE COMPRESSION  Your physician will perform a history and physical examination. This examination can identify other injuries or  identify other sources of symptoms. Examination will include assessment of your motion, strength, and the function of your nerves. Your physician will perform special tests to try to assess for specific sites of compression. Examination will also often include the neck (cervical spine).  You will often have X-rays performed before you see your Orthopaedic surgeon. These X-rays will help us to assess for causes of nerve irritation, whether there are any fractures, and whether there is any arthritis. While X-rays do not show the nerve, some signs on these X-rays can identify causes for compression. Your physician may also order an electromyogram (EMG) to assess nerve function or magnetic resonance imaging (MRI) of your neck to look for compression from your spine. A physician may also be able to assess your nerves using an ultrasound. Occasionally, if symptoms are particularly unclear, your surgeon may perform an injection of steroid near the nerve to determine if the symptoms are improved. These studies are not required in all patients.     TREATMENT  The treatment for peripheral neuropathies is often dependent on the specifics and duration of the symptoms. The main, early treatment method for most neuropathies is activity modification (i.e., avoiding problem activities), improving ergonomics, heat/ice packs, nighttime bracing, and oral medications (e.g., acetaminophen, ibuprofen, gabapentin). There is no specific duration for this management, though in most cases you should be able to notice meaningful improvement within a few months.   Your physician may recommend a corticosteroid injection for temporary relief of symptoms. This works by decreasing the body's inflammatory and pain response and may help temporarily decrease the space occupied by the nerve, decreasing pressure on it. However, there are some downsides to injections; these effects are only temporary and may simply mask long-term damage. Your physician  should discuss the risks and benefits with you beforehand. In general, you should only undergo multiple injections if you cannot safely undergo surgery.  In patients with symptoms that have not improved with the above management options, we will often recommend surgery. We also strongly recommend early surgery for patients with strength loss or muscle loss to prevent further damage as the muscles do not reliably recover even after decompression.   Carpal Tunnel Release    Your surgeon will make an incision over your wrist and use a sharp instrument to cut the transverse carpal ligament that acts as the roof to the carpal tunnel.     Cubital Tunnel Release    Your surgeon will make an incision over the inside of your elbow and use a sharp instrument to cut the structures tethering the ulnar nerve.    Ulnar Nerve Transposition  After performing a cubital tunnel release, your surgeon will reposition the nerve to the front of your elbow and use a sleeve of the released tissue to hold it in its new position.      RISKS  Peripheral nerve surgeries are overall very safe, but there are some risks to be aware of. Some of these risks depend on the type of anesthesia used as some patients may react to certain types of anesthesia. Often these surgeries can be done with light sedation and local anesthesia (injected numbing medications), which are very low risk. Other potential risks of the surgery include:  Pain - Some amount of pain is normal after any surgery but should be manageable with your post-operative pain protocol. In some patients, there will be persistent sensitivity around the scar. While this sensitivity almost always resolves, it may take many months. If your work or lifestyle requires you to frequently place pressure on the sites where these incisions are made, you should tell your surgeon so adjustments to the incision can be made. In general, performing carpal tunnel release with ultrasound guidance is  associated with substantially less pain at the scar site.  Infection - Infection after these surgeries is uncommon and when it does occur is typically superficial (only involving the outside region of the wound). Most often this can be managed with a brief course of antibiotics. In very rare cases, the wound may need to be opened up and cleaned.  Bleeding - Any time an incision is made to the skin, there will be some bleeding. Your surgeon will use a tourniquet during the surgery to decrease bleeding and make the nerve easier to see. You will typically lose less blood than would be taken in a blood test.  Injuries to the Nerve - Of all the possible complications of these surgeries, this is the most serious one. Your surgeon will be working directly with the nerve and its branches and there is always a potential to cause an injury. The nerves are delicate and sensitive and can become injured even without cutting them. Your surgeon will perform the surgery carefully and using glasses with built-in microscopes to decrease this risk as much as possible.  Persistent Symptoms - Occasionally symptoms may persist after nerve release surgery. While normal recovery may take weeks to months, especially in severe cases, a lack of improvement after appropriate healing may indicate that there is still compression on the nerve (possibly requiring a revision) or there is another site of compression (such as at the spine). If this occurs, your surgeon will typically be able to identify the cause with additional tests.     REHABILITATION AFTER SURGERY  The course of recovery following surgery depends somewhat upon the type of procedure the surgeon performs. All patients will have some amount of pain from the surgery that will be managed with medications, ice, and elevation. After a carpal tunnel release or cubital tunnel release, you will be in a padded bandage for three days. You will be able to use your limb but should avoid  lifting anything heavier than a coffee mug until your post-operative visit to maximize healing (unless you underwent an ultrasound-guided release only, in which case these restrictions are for only the first three days). During this period, you should also avoid the repetitive activities that caused symptoms before surgery (prolonged keyboard use, motorcycle riding or other sources of vibration, pushups). Depending on your occupation, commute, and demands, you may be able to return to work within days after surgery though often patients will take at least one week off from work. If you have a high demand job that requires heavy lifting, you should wait until your wound is assessed and sutures are removed at your two-week visit before considering returning to work.  After a nerve transposition, you will be in an elbow splint for two weeks followed by a sling for an additional two weeks. This will provide time for your nerve to heal in its new position and for the irritation of the nerve to recover. You will also typically need to work with occupational therapy starting after your first post-operative visit to restore elbow motion.     BATHING AFTER SURGERY     You will generally be able to remove your dressings after three to four days from surgery unless you are placed in an elbow splint, in which case this will be removed at your first follow-up visit. There will be tape, yellow gauze, and/or purple glue underneath and this may have a small amount of blood in it, which is normal. You may remove the yellow gauze if present but do not remove the tape or glue - they will eventually come off on their own. You may shower/bathe starting as soon as the day of surgery provided you are able to safely maneuver into the shower/bath and you are able to keep your dressings dry. There are shower bags that can be purchased online or you may use a trash bag with 2 rubber bands at the top to prevent water from entering. If your  dressings do become saturated, you should remove them. If you have a splint that becomes wet to the point that it cannot air dry within a few minutes, you should also remove it and notify your surgeon. Once the dressings are removed, you may shower like normal, but you should not allow direct pressure from the shower onto the surgical site until after your first follow-up visit. You should also avoid scrubbing the wound.    MEDICATIONS AFTER SURGERY  Your post-operative regimen will include medications for pain, nausea, and bowel health. Blood clots after peripheral nerve surgery are exceedingly rare, however if you have a history of blood clots, your surgeon may also start you on a course of anticoagulation for 2-4 weeks after surgery. To minimize narcotic use and establish better pain control, we employ a multimodal pain approach. This protocol combines the use of scheduled acetaminophen, and narcotics.  We will often use anti-inflammatories (NSAID's such as ibuprofen, celecoxib, and/or naproxen) to further assist with pain control thus allowing for a lower dose of narcotic to be used. Dosing of medications will vary from patient to patient based on their needs and past medical history, so please refer to your discharge medication list.     If you take any other medications at baseline, please discuss these with your surgeon, pharmacist, or primary care manager. In general, you can restart most of your home medications once you are discharged. If you take any pain medications on a regular basis, discontinue these during the early postoperative period and resume them as needed after you are done with your post-operative medications. If you are diabetic, you may resume your normal glucose control regimen at home if you are eating without issues. If you have issues tolerating oral intake, you should decrease your medication by half unless specifically instructed otherwise.    ICE PACK AFTER SURGERY     Icing can be  helpful after surgery to alleviate pain and swelling. If using an ice pack, you should apply this frequently in the first week after surgery. If you receive a nerve block from anesthesia, ensure you can feel the skin (the block has worn off) to prevent frostbite injuries. Ice bags/packs should be used for about 20 minutes every 3-4 hours during waking hours and should not be left on when sleeping. It may be used more often at first if pain/inflammation are intense. It is often helpful to protect your skin from frostbite with a washcloth, towel, or ace wrap between the ice bag/pack and your skin.    LONG-TERM SUCCESS  Carpal tunnel release is a very reliable surgery, with long-term success rates of approximately 90%. The time to improvement is largely dependent on the severity of your symptoms, with daytime symptoms resolving in milder cases as soon as the first day after surgery and more severe cases taking multiple weeks as the blood supply returns. Do not be discouraged if your symptoms are not improved by the first visit as only 56% of patients will experience improvement by this point. In general, pain symptoms (if present) resolve fastest, followed by daytime numbness, and nocturnal symptoms take longer to improve. Weakness from muscle loss may improve but is less predictable. Failure rates are extremely low, with the most common reasons for failure being an incomplete surgical release or another site of compression. If symptoms improve but then return, the cause may be scar formation.  Cubital tunnel release is slightly less reliable than carpal tunnel release, with long-term symptom resolution closer to 85%. When cubital tunnel release is unsuccessful, the most common reasons are residual instability of the nerve or tension on the nerve, which can both be relieved by performing an anterior transposition. 75% of patients who undergo revision transpositions will have good or excellent results, and those who do  not may benefit from a different type of transposition.    ADDITIONAL RESOURCES  If you have additional questions about peripheral nerve surgery, you may contact your surgeon directly or you may consider reading more online. Some useful and accurate websites include:    [email or web address].[email or web address]  [email or web address]    INFORMATION ABOUT YOUR SURGEON  Dr. Luis Mayfield is an Orthopaedic surgeon with advanced skills in orthopaedic sports, trauma, and upper limb surgery. He completed his training at [facility]. He has authored over 50 peer-review papers and several book chapters advancing surgical techniques, surgeon education, and patient treatment. He is recognized as a field-leading specialist in upper extremity surgery and is an assistant professor of surgery at the [employer] and [employer].

## 2025-02-07 NOTE — PROGRESS NOTES
Orthopaedic Surgery Follow-up Patient Visit  _______________________________________________________________________________________________________________  _______________________________________________________________________________________________________________    DATE OF VISIT: 2/7/2025     CHIEF COMPLAINT: Follow-up right hand    Chief Complaint   Patient presents with    Post-Op     Right hand POV - sx was on 09/25/24 - has been having numbness and tingling - gets some tightness with the  numbness - can't bend pinky - pain rated 10/10 on and off         HISTORY OF PRESENT ILLNESS: Gisell Zepeda is a 39 year old female who presents to the clinic for follow-up for her right hand.  She underwent operative fixation of her right small finger in September of this last year.  She has overall done well from a healing standpoint but continues to have stiffness in this finger with loss of full flexion.  She is more bothered at this time by worsening symptoms of carpal tunnel that have progressed despite conservative measures including brace application, tendon excursion  exercises, and appropriate therapeutic exercises.  She has intermittent pain shooting through her nerve distribution and into her thenar musculature with a sensation of charley horses that can be as severe as 10/10.  She reports that her brace helps at night, but immediately stops helping once the brace is removed.  She is now interested in further intervention.     SOCIAL HISTORY  Social History     Socioeconomic History    Marital status: Single     Spouse name: Not on file    Number of children: Not on file    Years of education: Not on file    Highest education level: Not on file   Occupational History    Occupation: HR.    Tobacco Use    Smoking status: Never     Passive exposure: Never    Smokeless tobacco: Never   Vaping Use    Vaping status: Never Used   Substance and Sexual Activity    Alcohol use: Yes     Comment: socially     Drug use:  Never    Sexual activity: Not on file   Other Topics Concern    Not on file   Social History Narrative    Not on file     Social Drivers of Health     Food Insecurity: Not on file   Transportation Needs: Not on file   Stress: Not on file   Housing Stability: Not on file        PAST MEDICAL HISTORY  Past Medical History:    Abrasion of toe of left foot    Class 3 severe obesity due to excess calories with serious comorbidity and body mass index (BMI) of 40.0 to 44.9 in adult (HCC)    Diabetes (HCC)    Diabetes mellitus, type 2 (HCC)    Essential hypertension    High blood pressure    High cholesterol    High cholesterol    History of blood transfusion    no reactions    Hypercholesterolemia with hypertriglyceridemia    Intertrigo    Morbid (severe) obesity due to excess calories (HCC)    Morbid obesity with BMI of 45.0-49.9, adult (HCC)    Morbid obesity with BMI of 50.0-59.9, adult (HCC)    S/P gastric bypass    Uncontrolled type 2 diabetes mellitus with hyperglycemia ([facility])    Visual impairment    GLASSES        PAST SURGICAL HISTORY  Past Surgical History:   Procedure Laterality Date    Hc surgery catheter eps dx/ablation other than 3d [id number]      abdominoplasty, removal of excess skin on bilateral arms    Lap gastric bypass/nayan-en-y  09/14/2020    Dr Black, [facility]        MEDICATIONS  Reviewed   Phentermine HCl 15 MG Oral Cap Take 1 capsule (15 mg total) by mouth every morning. 30 capsule 5    semaglutide (OZEMPIC, 0.25 OR 0.5 MG/DOSE,) 2 MG/3ML Subcutaneous Solution Pen-injector Inject 0.5 mg into the skin once a week. 9 mL 1    sulfamethoxazole-trimethoprim -160 MG Oral Tab per tablet Take 1 tablet by mouth 2 (two) times daily. 20 tablet 0    atorvastatin 10 MG Oral Tab Take 1 tablet (10 mg total) by mouth nightly. 90 tablet 3    metFORMIN 500 MG Oral Tab Take 1.5 tabs before breakfast and 1.5 tabs before dinner 270 tablet 0    ondansetron (ZOFRAN) 4 mg tablet Take 1 tablet (4 mg total) by mouth  every 8 (eight) hours as needed for Nausea. 10 tablet 0    Acetaminophen 500 MG Oral Cap Take 2 capsules (1,000 mg total) by mouth every 8 (eight) hours as needed for Pain. Never exceed 3000 mg in a 24-hour period.  Many over-the-counter medications also have acetaminophen in them. 180 capsule 0    senna-docusate 8.6-50 MG Oral Tab Take 2 tablets by mouth at bedtime. 28 tablet 0    Vitamin C 500 MG Oral Tab Take 1 tablet (500 mg total) by mouth in the morning and 1 tablet (500 mg total) before bedtime. 84 tablet 0    gabapentin 300 MG Oral Cap Take 1 capsule (300 mg total) by mouth every 8 (eight) hours. 30 capsule 0    Norgestim-Eth Estrad Triphasic (ORTHO TRI-CYCLEN LO) 0.18/0.215/0.25 MG-25 MCG Oral Tab Take 1 tablet by mouth daily. (Patient taking differently: Take 1 tablet by mouth every evening.) 84 tablet 3    Glucose Blood (ONETOUCH ULTRA) In Vitro Strip CHECK BLOOD SUGAR TWICE DAILY 200 strip 3    fluconazole 200 MG Oral Tab Take 4 tablets (800 mg total) by mouth daily. 28 tablet 0    Lancets Does not apply Misc Check blood sugar twice daily. 50 each 2    Blood Glucose Monitoring Suppl (ONETOUCH ULTRA 2) w/Device Does not apply Kit Check blood sugar 2 times daily 1 kit 0    Continuous Blood Gluc  (DEXCOM G6 ) Does not apply Device 1 each by Does not apply route See Admin Instructions. 1 Device 0    Continuous Blood Gluc Sensor (DEXCOM G6 SENSOR) Does not apply Misc 1 each by Does not apply route See Admin Instructions. 9 each 3    Continuous Blood Gluc Transmit (DEXCOM G6 TRANSMITTER) Does not apply Misc 1 each by Does not apply route See Admin Instructions. 3 each 3    Multiple Vitamin (MULTIVITAMINS OR) Take by mouth. Bariatric vitamins           ALLERGIES  Allergies[1]     FAMILY HISTORY  Family History   Problem Relation Age of Onset    Diabetes Mother     Breast Cancer Mother 62    Hypertension Father     Diabetes Father     Other (EToHism. ) Maternal Grandmother     Diabetes Paternal  Grandmother     Hypertension Paternal Grandmother     Lipids Paternal Grandmother     Renal Disease Paternal Grandmother     Diabetes Paternal Grandfather         Type 1, IDDM with retinoapthy.     Stroke Paternal Grandfather     Other (PVD) Paternal Grandfather     Other (retinopathy) Paternal Grandfather     Breast Cancer Paternal Aunt         REVIEW OF SYSTEMS  A 14 point review of systems was performed. Pertinent positives and negatives noted in the HPI.    PHYSICAL EXAM  Ht 4' 9.2\" (1.453 m)   Wt 139 lb (63 kg)   LMP 10/31/2024 (Approximate)   BMI 29.87 kg/m²      Constitutional: The patient is well-developed, well-nourished, in no acute distress.  Neurological: Alert and oriented to person, place, and time.  Psychiatric: Mood and affect normal.  Head: Normocephalic and atraumatic.  Cardiovascular: regular rate by palpation  Pulmonary/Chest: Effort normal. No respiratory distress. Breathing non-labored  Abdominal: Abdomen exhibits no distension.   Right  Wrist and hand  Inspection/Palpation  No readily apparent visual abnormalities of the wrist or elbow.   No Thenar atrophy  No Hypothenar atrophy  No ecchymosis, erythema, or effusion.   No breaks in skin. Skin is euthermic  Focally tender to palpation to thenar imminence  ROM  Wrist Flexion-extension: 80 degrees flexion - 0 degrees extension  Unable to fully flex small finger due to flexion contracture through the PIP joint and to a lesser degree at the DIP joint  SF PIP 10-60  SF DIP 5-40  Motor  Fires AIN/PIN/U/M/recurrent motor branch   Sensory  SILT radial nerve distribution  Moderate alterations in median nerve distribution  No alterations in ulnar nerve distribution  POSITIVE Tests  Spine: None  CTS: Tinels over Carpal Tunnel and Phalen  Cubital Tunnel: None  NEGATIVE Tests  Spine: Lhermitte's sign, Reverse Brachioradialis sign, and Anshu's test  CTS: None  Cubital Tunnel: Elbow flexion test and Froment sign  Wrist Instability: Watsons shift,  Midcarpal instability  Radial Styloid Tenosynovitis: Finkelsteins  Epicondylitis: Pain with long finger extension  2+ radial pulse, negative herman's test, <2s capillary refill in all digits     ASSESSMENT/PLAN: Gisell Zepeda is a 39 year old female who presents to the Orthopaedic surgery clinic today for follow-up for her right wrist and hand.Based on history, physical exam, and available imaging, the patient diagnosis is consistent with carpal tunnel syndrome and postoperative stiffness.  These have been refractory to conservative measures and are severely functionally/lifestyle limiting.  The management options for this process were discussed with the patient to include conservative and surgical options. Given the patient's age, functional status, and findings, I recommend operative intervention.      - General risks of surgery reviewed including risks of pain, bleeding, infection, scarring, damage to surrounding structures, need for further procedures, blood transfusion, VTE, risks of anesthesia, failure to improve symptoms, and recurrence of disease.    - Patient will be placed on ERAS pathway. Written and verbal pre-op instructions given, including information regarding pre-op diet, utilization of chlorhexidine, and expectations for post-op activity. Discussed expected course of recovery and post-op activity restrictions.  - Discussed discharge pathway.  - Discussed postoperative pain management and expectation that patient may require narcotic medication as an adjunct to multi-modal analgesic therapy.  - All questions were answered and patient desires to proceed.   - Plan to sign consents on the day of surgery.   - Preoperative testing needed: UPT on day of surgery  - See below for specific surgical planning details:      Surgical Planning:  Procedure: Right open carpal tunnel release and small finger manipulation under anesthesia  PMH considerations: Noncontributory  PSH considerations: Prior pinning of right  small finger  Drug Allergies: NKDA  Anesthesia issues: no FHx or personal problems with anesthesia  Planned positioning: Supine with hand table  Bleeding Issues: no personal of FHx of bleeding or clotting problems   Pre-op consultations needed: None  Additional pre-op imaging needed: None  Counseled for smoking cessation to improve OR outcome: N/A  Special equipment needed in the operating room: None  Post-op pain regimen:  icing, elevation, Tylenol, celebrex, tramadol/oxycodone  Informed Consent: to be signed on day of surgery    I have personally seen Gisell Zepeda and discussed in detail their plan of care. Prior to departure, they indicated agreement with and understanding of their plan of care and their follow-up as documented herein this note. Please note that this note was written in combination with voice recognition/dictation software and there is a possibility of transcription errors which were not identified at the time of note submission. If clarification is necessary, please contact the author or clinic staff.    Luis Mayfield MD  Orthopaedic Surgery  2/7/2025      Rationale for 57 Modifier Addendum    Decision for Major Surgery  The decision for a major surgery was made during this visit/consultation based on review and discussion of the history of presentation, examination, available labs/imaging, and the patient's functional status. The medical and surgical history were considered with regards to risk and potential modifications needed.           [1] No Known Allergies

## 2025-02-07 NOTE — TELEPHONE ENCOUNTER
Ortho Surgery Request  Surgery: Right carpal tunnel release and small finger manipulation under anesthesia      Surgical Assistant: Sudeep Mckenzie  Surgery Day Request: 22Feb, 28Feb at noon in [facility], 9 April after 3PM  Estimated Procedure Length: 20 minutes  Anesthesia type: General   Position: Supine with hand table   Special Needs:[x]Coban roll  Equipment:  None  [place] [facility] [place] [facility]  Post Op Visit Date: 2 weeks  CPT Code:   55669, 34343     ICD Code:G56.01, M25.641  Medical Clearance Needed: NA  Preadmission Testing Orders:  Anesthesia testing protocols and anesthesia pre op orders will be used  Additional PAT orders:  Pre OP Orders:  Use [facility] procedure driven order set in addition to anesthesia protocol  Additional Pre Op Orders:  PCN Allergy:  No  If Yes:   __X__  Admit:  Hospital outpatient surgery

## 2025-02-10 NOTE — TELEPHONE ENCOUNTER
Spoke with patient to inform her that we are able to schedule her surgery with Dr. Mayfield for 4/9. I informed her that I will add her to the wait list for a sooner surgery date.   Patient wants to know if she should continue physical therapy since surgery is not until April. Dr. Mayfield please advise.

## 2025-02-10 NOTE — TELEPHONE ENCOUNTER
Type of surgery:  Right carpal tunnel release and small finger manipulation under anesthesia   Date: 4/9/25  Location: [facility]   Medical Clearance: No     *Medical:      *Dental:      *Other:  Prior Authorization Status: Pending   Workers Comp:  Velma/Sathish:  [place]: Yes  POV: BIA

## 2025-02-12 ENCOUNTER — APPOINTMENT (OUTPATIENT)
Dept: OCCUPATIONAL MEDICINE | Facility: HOSPITAL | Age: 40
End: 2025-02-12
Attending: STUDENT IN AN ORGANIZED HEALTH CARE EDUCATION/TRAINING PROGRAM
Payer: COMMERCIAL

## 2025-02-13 ENCOUNTER — TELEPHONE (OUTPATIENT)
Dept: PHYSICAL THERAPY | Facility: HOSPITAL | Age: 40
End: 2025-02-13

## 2025-02-13 ENCOUNTER — APPOINTMENT (OUTPATIENT)
Dept: OCCUPATIONAL MEDICINE | Facility: HOSPITAL | Age: 40
End: 2025-02-13
Attending: STUDENT IN AN ORGANIZED HEALTH CARE EDUCATION/TRAINING PROGRAM
Payer: COMMERCIAL

## 2025-02-17 ENCOUNTER — APPOINTMENT (OUTPATIENT)
Dept: OCCUPATIONAL MEDICINE | Facility: HOSPITAL | Age: 40
End: 2025-02-17
Attending: STUDENT IN AN ORGANIZED HEALTH CARE EDUCATION/TRAINING PROGRAM
Payer: COMMERCIAL

## 2025-02-19 ENCOUNTER — APPOINTMENT (OUTPATIENT)
Dept: OCCUPATIONAL MEDICINE | Facility: HOSPITAL | Age: 40
End: 2025-02-19
Attending: STUDENT IN AN ORGANIZED HEALTH CARE EDUCATION/TRAINING PROGRAM
Payer: COMMERCIAL

## 2025-02-26 ENCOUNTER — APPOINTMENT (OUTPATIENT)
Dept: OCCUPATIONAL MEDICINE | Facility: HOSPITAL | Age: 40
End: 2025-02-26
Attending: STUDENT IN AN ORGANIZED HEALTH CARE EDUCATION/TRAINING PROGRAM
Payer: COMMERCIAL

## 2025-02-27 ENCOUNTER — OFFICE VISIT (OUTPATIENT)
Dept: OCCUPATIONAL MEDICINE | Facility: HOSPITAL | Age: 40
End: 2025-02-27
Attending: STUDENT IN AN ORGANIZED HEALTH CARE EDUCATION/TRAINING PROGRAM
Payer: COMMERCIAL

## 2025-02-27 ENCOUNTER — TELEPHONE (OUTPATIENT)
Dept: INTERNAL MEDICINE CLINIC | Facility: CLINIC | Age: 40
End: 2025-02-27

## 2025-02-27 PROCEDURE — 97110 THERAPEUTIC EXERCISES: CPT

## 2025-02-27 RX ORDER — FLUCONAZOLE 150 MG/1
150 TABLET ORAL ONCE
Qty: 1 TABLET | Refills: 0 | Status: SHIPPED | OUTPATIENT
Start: 2025-02-27 | End: 2025-02-27

## 2025-02-27 NOTE — TELEPHONE ENCOUNTER
Will give Diflucan 150 mg x 1 with cold carbonated beverage.  If no better after 48 hours she needs to come in and be seen.  Make sure she is not having any urinary symptoms burning or pain when she urinates or blood in the urine or abdominal pain or back pain excetra.

## 2025-02-27 NOTE — TELEPHONE ENCOUNTER
Left detailed message per DAPHNEY that RX has been sent to [facility] and with Dr Delgado's instructions as noted below        Advised to call back with any questions/ concerns   Office phone number provided with office telephone hours.

## 2025-02-27 NOTE — TELEPHONE ENCOUNTER
Patient calling ( name and date of birth of patient verified ) reports symptoms of Yeast infection;  has vaginal itching,  vaginal discharge, cottage cheese like ; onset of 5 days     Has had yeast infections in the past   BG have been a bit elevated due to a cold     Has tried Monostat  with no relief     Patient declines offer of office visit, requesting Diflucan if possible      Allergies reviewed and pharmacy confirmed  Please advise and thank you.  Please reply to pool: EM RN TRIAGE

## 2025-02-27 NOTE — PROGRESS NOTES
Diagnosis:   Orthopedic aftercare (Z47.89)       Referring Provider: Luis Mayfield  Date of Evaluation:    11/5/2024    Precautions:  following [place] Hand Protocols Next MD visit:   none scheduled  Date of Surgery: 09/25/24   Insurance Primary/Secondary: BCBS OUT OF STATE / N/A     # Auth Visits: 20 visits 10/11-12/31             Subjective: Pt saw doctor then had a family emerency impacting treatment frequency. Pt and doctor have decided on surgery on 04/09/25 for CTR and manipulation of the SF.    Pain: 0/10      Objective: See exercises flowsheet below for exercises and activities performed today. All exercises performed bilaterally.      Assessment: Pt able to be passively flexed deeper than actively. FDS continues to be most challenging for patient. Median nerve glides continued d/t intense numbness present.    Goals: 1x/week or a total of 4 visits   Pt will be independent and compliant with comprehensive HEP to maintain progress achieved in OT-MET  Pt will decrease their (R) SF P1 edema by 0.5 cm or more for ease of grasping utensils.-MET  Pt will decrease their (R) MCP edema by 0.5 cm or more for ease of holding steering wheel-MET  Pt will increase their (R) wrist flexion and extension by 10 degrees for ease of dressing-MET  Pt will increase their (R) RF BERGERON to at least 200 degrees for ease of holding cup.-MET 1/29/2025  Pt will increase their (R) SF BERGERON to at least 200  degrees for ease of cupping medications.-Progressed  Pt will decrease their QuickDASH score by 10% in order to demo improvements in functional independence.-NT   Updated/Additional Goals:  Pt will increase their (R)  strength to at least 25# for ease of holding bow for hunting.-MET 1/29/2025  Pt will increase their (R) pinch strength scores by 3-5# for ease of opening bottles and jars.-Progressing  Updated 1/29/2025:   Pt will increase their (R)  strength to at least 35# for ease of holding bow for hunting.      Plan: possibly  discharge next session  Date 11/7/2024   11/18/2024   11/21/2024 11/25/2024   12/3/2024  12/5/2024  12/12/2024  12/17/2024  12/23/2024  12/31/2024  1/7/2025  1/9/2025  1/13/2025  1/15/2025  1/20/2025  1/22/2025  1/29/2025  2/3/2025  2/27/2025   Visit # 2  3  4  5  6  7  8 9 10 11 12 13 14 15 16 17 18 1/4 additional visit 2/4                     Progress Note              Evaluation               8 more requested             Manual                                           PROM into composite flexion PROM into composite flexion 10x1, 10-20 sec holds PROM into composite flexion 10x1, 10-20 sec holds PROM into composite flexion 10x1, 10-20 sec holds    IASTM to volar palm and R SF (5 mins)   Tuning fork vibration scar massage (5 mins)   PROM with 10-20 second holds PROM with 10-20 second holds   IASTM to volar R SF (5 mins) IASTM to volar R SF (5 mins)  IASTM to volar R SF    Ther ex                                             Digital adduction 10x2    Intrinsic plus position 10x2    Hook curl 10x2    Joint blocking MP, PIP, DIP     Fluidotherapy with digital and wrist AROM (7 mins)    Composite fist rolls 20x1    Median nerve glides 10x1    Joint blocking MP, PIP, DIP 10x2   Fluidotherapy with digital and wrist AROM (7 mins)    Composite fist rolls 20x1    Digital adduction 20x1    Joint blocking MP, PIP, DIP 10x2    Fabrication of PIP flexion relative motion orthosis (5 mins) Fluidotherapy with digital and wrist AROM (7 mins)    Composite fist rolls 20x1    Digital adduction 20x1    Yellow putty ex:   5x3  -FDP glides  -lumbrical squeeze  -opposition  -adduction Paraffin with prolonged stretch of the R SF into flexion using coband (5 mins)    Composite fist rolls 20x1    Joint blocking PIP 20x1    Red putty ex: 10x2  -  -lumbrical squeeze  -opposition  -adduction     Paraffin with prolonged stretch of the R SF into flexion using coband (5 mins)    Composite flexion 20x2    Blue Web ex  -  -lumbrical  squeeze  10x2    Yellow Flexbar 6# 10x2  -bilateral supination         Paraffin with prolonged stretch of the R SF into flexion using coband (5 mins)    Composite flexion 20x2    PIP flexion with orthosis 20x1    [place] cupping into thumb<>palm translation (8 mins)         Paraffin with prolonged stretch of the R SF into flexion using coband (5 mins)    Composite flexion 20x2    Table top 10x2    Hook curl 10x2    Full arm median nerve glides 10x1    Red Putty Ex:  -FDS/FDP glides 10x1  -tip pinch SF and thumb 10x2     Paraffin with prolonged stretch of the R SF into flexion using coband (5 mins)    Composite flexion 20x2    Red Putty Ex:  -  -adduction  -opposition  -digital extension  10x3 Paraffin with prolonged stretch of the R SF into flexion using coband (5 mins)    Composite flexion 20x2    [place] cupping into thumb<>palm translation (8 mins)    Blue Web Ex:   -lumbrical squeeze  10x2    Rubber band ex  -lumbrical extension  -abduction  10x2 Paraffin with prolonged stretch of the R SF into flexion using coband (5 mins)    Composite flexion 20x2    Digital abd/aduction 10x2    Blue Web Ex:   -lumbrical squeeze  -Lumbrical extension  12x2          Prolonged flexion stretch orthosis fabrication initiation Paraffin with prolonged stretch of the R SF into flexion using coband (5 mins)    Joint blocking MP, PIP, DIP 10x2    Median nerve glides 10x2    Digital abd/aduction 10x2    Blue Web Ex:   -lumbrical squeeze  -Lumbrical extension  12x2    Low load Prolonged flexion stretch orthosis fabrication initiation completed Paraffin with prolonged stretch of the R SF into flexion using coband (5 mins)    Red putty  -FDP glides of SF into full composite FDP glides 10x2    Hook curls 10x3    Blue Web Ex:   -lumbrical squeeze  15x2 Paraffin with prolonged stretch of the R SF into flexion using coband (5 mins)    SF flexion and holds 10x3    Hook curls 10x3    Rubber band SF abd/adduction 10x3    SF rubber band  extension 10x3    Relative motion orthosis PIP/DIP flexion 10x3    Blue Web  Paraffin with prolonged stretch of the R SF into flexion using coband (5 mins)    SF flexion and holds 10x3    Rubber band SF abd/adduction 10x3    SF rubber band extension 10x3    SF rubber band flexion 10x2    Wooden block stacking of 3s into cupping into palm for SF activation into flexion (5 mins) Paraffin with prolonged stretch of the R SF into flexion using coband (5 mins)    SF flexion and holds 10x3    PIP and DIP joint flexion blocking 10x2    Rubber band SF abd/adduction 10x3    SF rubber band extension 10x3    Black Web Ex:   -lumbrical squeeze  15x2    3# digi-flex 10x2 iso-digital flexion  Tendon glides 10x2    Rubber band SF abd/adduction 10x3    SF rubber band extension 10x3  Rubber band     Black Web Ex:   -lumbrical squeeze  15x2    Green pin velcro block removal with composite flexion    Red pin thumb and SF pinch removal of velcro blocks    Red flexbar 10#   -bilateral sup/pro 10x2    Red flexbar 10#   - and bends into \"T\" shape   10x2 Paraffin with prolonged stretch of the R SF into flexion using coband (5 mins)    SF flexion into passive holds 15-20 seconds 5x1    Green Putty Ex:   10x2  -lumbrical squeeze  -opposition (3 rows)  -composite ulnar sided flexion (RF/SF) SF add/abduction 10x2    PIP and DIP flexion joint blocking 10x3    Blue Web ex:   -lumbrical squeeze  -digital extension  15x2    -[place]uart manipulation (5 marbles at a time) thumb<>palm translatiion    Median nerve glides 10x2    Wall slides into Y scapular retraction    Green Putty Ex:   10x2  -lumbrical squeeze  -opposition (3 rows)  -FDS glides into putty 10x1         HEP instruction                               Therapeutic Activity                                          Adduction of RF and SF sponge lifts and transports (5 mins)    Composite flexion cone grasp and pull of marbles across the table (10 marbles) Adduction of RF and SF large peg  lifts and transports (5 mins)       Composite flexion cone grasp and stack (9 cones) 3 times                  Neuromuscular Re-education                                                                NMES to forearm flexor for digital:   Pulse Duration: 150  Freq: 35 pps   Amplitude: 15  On/off time: 10/10  Duration: 8 mins   NMES to forearm flexor for digital:   Pulse Duration: 150  Freq: 35 pps   Amplitude: 15  On/off time: 10/10  Duration: 8 mins      Modalities                                      Moist Heat applied to digit (3 mins)                               HEP:  Assignment date:   tendon glides, flexion joint blocking, and digital adduction 11/05/24   Median nerve glides 11/18/24           Access Code: [email or web address]  URL: [email or web address]  Date: 12/23/2024  Prepared by: Julissa TAYLOR MS, OTR/L    Exercises  - Putty Squeezes  - 2 x daily - 7 x weekly - 2 sets - 10 reps  - Thumb Opposition with Putty  - 2 x daily - 7 x weekly - 2 sets - 10 reps  - Finger Adduction with Putty  - 2 x daily - 7 x weekly - 2 sets - 10 reps  - Key Pinch with Putty  - 2 x daily - 7 x weekly - 2 sets - 10 reps      Charges: TE 3   Total Timed Treatment: 40 min  Total Treatment Time: 40 min

## 2025-03-01 DIAGNOSIS — M25.641 FINGER STIFFNESS, RIGHT: ICD-10-CM

## 2025-03-01 DIAGNOSIS — G56.01 CARPAL TUNNEL SYNDROME OF RIGHT WRIST: Primary | ICD-10-CM

## 2025-03-05 ENCOUNTER — OFFICE VISIT (OUTPATIENT)
Dept: OCCUPATIONAL MEDICINE | Facility: HOSPITAL | Age: 40
End: 2025-03-05
Attending: STUDENT IN AN ORGANIZED HEALTH CARE EDUCATION/TRAINING PROGRAM
Payer: COMMERCIAL

## 2025-03-05 PROCEDURE — 97110 THERAPEUTIC EXERCISES: CPT

## 2025-03-05 NOTE — PROGRESS NOTES
Diagnosis:   Orthopedic aftercare (Z47.89)       Referring Provider: Luis Mayfield  Date of Evaluation:    11/5/2024    Precautions:  following [place] Hand Protocols Next MD visit:   none scheduled  Date of Surgery: 09/25/24   Insurance Primary/Secondary: BCBS OUT OF STATE / N/A     # Auth Visits: 20 visits 10/11-12/31             Subjective: Pt states feeling stiffer with the weather.     Pain: 0/10      Objective: See exercises flowsheet below for exercises and activities performed today. All exercises performed bilaterally.      Assessment: Pt arrived 15 minutes late d/t traffic therefore treatment limited. Gliding of the FDS limited when isolated. Gripping activities deferred d/t N/T.     Goals: 1x/week or a total of 4 visits   Pt will be independent and compliant with comprehensive HEP to maintain progress achieved in OT-MET  Pt will decrease their (R) SF P1 edema by 0.5 cm or more for ease of grasping utensils.-MET  Pt will decrease their (R) MCP edema by 0.5 cm or more for ease of holding steering wheel-MET  Pt will increase their (R) wrist flexion and extension by 10 degrees for ease of dressing-MET  Pt will increase their (R) RF BERGERON to at least 200 degrees for ease of holding cup.-MET 1/29/2025  Pt will increase their (R) SF BERGERON to at least 200  degrees for ease of cupping medications.-Progressed  Pt will decrease their QuickDASH score by 10% in order to demo improvements in functional independence.-NT   Updated/Additional Goals:  Pt will increase their (R)  strength to at least 25# for ease of holding bow for hunting.-MET 1/29/2025  Pt will increase their (R) pinch strength scores by 3-5# for ease of opening bottles and jars.-Progressing  Updated 1/29/2025:   Pt will increase their (R)  strength to at least 35# for ease of holding bow for hunting.      Plan: discharge from therapy next session until manipulation  Date 11/7/2024   11/18/2024   11/21/2024 11/25/2024   12/3/2024  12/5/2024   12/12/2024  12/17/2024  12/23/2024  12/31/2024  1/7/2025  1/9/2025  1/13/2025  1/15/2025  1/20/2025  1/22/2025  1/29/2025  2/3/2025  2/27/2025 3/5/2025   Visit # 2  3  4  5  6  7  8 9 10 11 12 13 14 15 16 17 18 1/4 additional visit 2/4 3/4                     Progress Note               Evaluation               8 more requested              Manual                                            PROM into composite flexion PROM into composite flexion 10x1, 10-20 sec holds PROM into composite flexion 10x1, 10-20 sec holds PROM into composite flexion 10x1, 10-20 sec holds    IASTM to volar palm and R SF (5 mins)   Tuning fork vibration scar massage (5 mins)   PROM with 10-20 second holds PROM with 10-20 second holds   IASTM to volar R SF (5 mins) IASTM to volar R SF (5 mins)  IASTM to volar R SF     Ther ex                                              Digital adduction 10x2    Intrinsic plus position 10x2    Hook curl 10x2    Joint blocking MP, PIP, DIP     Fluidotherapy with digital and wrist AROM (7 mins)    Composite fist rolls 20x1    Median nerve glides 10x1    Joint blocking MP, PIP, DIP 10x2   Fluidotherapy with digital and wrist AROM (7 mins)    Composite fist rolls 20x1    Digital adduction 20x1    Joint blocking MP, PIP, DIP 10x2    Fabrication of PIP flexion relative motion orthosis (5 mins) Fluidotherapy with digital and wrist AROM (7 mins)    Composite fist rolls 20x1    Digital adduction 20x1    Yellow putty ex:   5x3  -FDP glides  -lumbrical squeeze  -opposition  -adduction Paraffin with prolonged stretch of the R SF into flexion using coband (5 mins)    Composite fist rolls 20x1    Joint blocking PIP 20x1    Red putty ex: 10x2  -  -lumbrical squeeze  -opposition  -adduction     Paraffin with prolonged stretch of the R SF into flexion using coband (5 mins)    Composite flexion 20x2    Blue Web ex  -  -lumbrical squeeze  10x2    Yellow Flexbar 6# 10x2  -bilateral supination         Paraffin with  prolonged stretch of the R SF into flexion using coband (5 mins)    Composite flexion 20x2    PIP flexion with orthosis 20x1    [place] cupping into thumb<>palm translation (8 mins)         Paraffin with prolonged stretch of the R SF into flexion using coband (5 mins)    Composite flexion 20x2    Table top 10x2    Hook curl 10x2    Full arm median nerve glides 10x1    Red Putty Ex:  -FDS/FDP glides 10x1  -tip pinch SF and thumb 10x2     Paraffin with prolonged stretch of the R SF into flexion using coband (5 mins)    Composite flexion 20x2    Red Putty Ex:  -  -adduction  -opposition  -digital extension  10x3 Paraffin with prolonged stretch of the R SF into flexion using coband (5 mins)    Composite flexion 20x2    [place] cupping into thumb<>palm translation (8 mins)    Blue Web Ex:   -lumbrical squeeze  10x2    Rubber band ex  -lumbrical extension  -abduction  10x2 Paraffin with prolonged stretch of the R SF into flexion using coband (5 mins)    Composite flexion 20x2    Digital abd/aduction 10x2    Blue Web Ex:   -lumbrical squeeze  -Lumbrical extension  12x2          Prolonged flexion stretch orthosis fabrication initiation Paraffin with prolonged stretch of the R SF into flexion using coband (5 mins)    Joint blocking MP, PIP, DIP 10x2    Median nerve glides 10x2    Digital abd/aduction 10x2    Blue Web Ex:   -lumbrical squeeze  -Lumbrical extension  12x2    Low load Prolonged flexion stretch orthosis fabrication initiation completed Paraffin with prolonged stretch of the R SF into flexion using coband (5 mins)    Red putty  -FDP glides of SF into full composite FDP glides 10x2    Hook curls 10x3    Blue Web Ex:   -lumbrical squeeze  15x2 Paraffin with prolonged stretch of the R SF into flexion using coband (5 mins)    SF flexion and holds 10x3    Hook curls 10x3    Rubber band SF abd/adduction 10x3    SF rubber band extension 10x3    Relative motion orthosis PIP/DIP flexion 10x3    Blue Web  Paraffin with  prolonged stretch of the R SF into flexion using coband (5 mins)    SF flexion and holds 10x3    Rubber band SF abd/adduction 10x3    SF rubber band extension 10x3    SF rubber band flexion 10x2    Wooden block stacking of 3s into cupping into palm for SF activation into flexion (5 mins) Paraffin with prolonged stretch of the R SF into flexion using coband (5 mins)    SF flexion and holds 10x3    PIP and DIP joint flexion blocking 10x2    Rubber band SF abd/adduction 10x3    SF rubber band extension 10x3    Black Web Ex:   -lumbrical squeeze  15x2    3# digi-flex 10x2 iso-digital flexion  Tendon glides 10x2    Rubber band SF abd/adduction 10x3    SF rubber band extension 10x3  Rubber band     Black Web Ex:   -lumbrical squeeze  15x2    Green pin velcro block removal with composite flexion    Red pin thumb and SF pinch removal of velcro blocks    Red flexbar 10#   -bilateral sup/pro 10x2    Red flexbar 10#   - and bends into \"T\" shape   10x2 Paraffin with prolonged stretch of the R SF into flexion using coband (5 mins)    SF flexion into passive holds 15-20 seconds 5x1    Green Putty Ex:   10x2  -lumbrical squeeze  -opposition (3 rows)  -composite ulnar sided flexion (RF/SF) SF add/abduction 10x2    PIP and DIP flexion joint blocking 10x3    Blue Web ex:   -lumbrical squeeze  -digital extension  15x2    -[place] manipulation (5 marbles at a time) thumb<>palm translatiion    Median nerve glides 10x2    Wall slides into Y scapular retraction    Green Putty Ex:   10x2  -lumbrical squeeze  -opposition (3 rows)  -FDS glides into putty 10x1       SF add/abduction 10x2    PIP and DIP flexion joint blocking 10x3    Green Putty Ex:   -tip pinches 20x2  -lumbrical extension  -grasp and pulls 10x2    Median nerve glides 10x2       HEP instruction                                Therapeutic Activity                                           Adduction of RF and SF sponge lifts and transports (5 mins)    Composite flexion cone  grasp and pull of marbles across the table (10 marbles) Adduction of RF and SF large peg lifts and transports (5 mins)       Composite flexion cone grasp and stack (9 cones) 3 times                   Neuromuscular Re-education                                                                 NMES to forearm flexor for digital:   Pulse Duration: 150  Freq: 35 pps   Amplitude: 15  On/off time: 10/10  Duration: 8 mins   NMES to forearm flexor for digital:   Pulse Duration: 150  Freq: 35 pps   Amplitude: 15  On/off time: 10/10  Duration: 8 mins       Modalities                                       Moist Heat applied to digit (3 mins)                                HEP:  Assignment date:   tendon glides, flexion joint blocking, and digital adduction 11/05/24   Median nerve glides 11/18/24           Access Code: [email or web address]  URL: [email or web address]  Date: 12/23/2024  Prepared by: Julissa TAYLOR MS, OTR/L    Exercises  - Putty Squeezes  - 2 x daily - 7 x weekly - 2 sets - 10 reps  - Thumb Opposition with Putty  - 2 x daily - 7 x weekly - 2 sets - 10 reps  - Finger Adduction with Putty  - 2 x daily - 7 x weekly - 2 sets - 10 reps  - Key Pinch with Putty  - 2 x daily - 7 x weekly - 2 sets - 10 reps      Charges: TE 2   Total Timed Treatment: 30 min  Total Treatment Time: 30 min

## 2025-03-06 ENCOUNTER — APPOINTMENT (OUTPATIENT)
Dept: OCCUPATIONAL MEDICINE | Facility: HOSPITAL | Age: 40
End: 2025-03-06
Attending: STUDENT IN AN ORGANIZED HEALTH CARE EDUCATION/TRAINING PROGRAM
Payer: COMMERCIAL

## 2025-03-12 ENCOUNTER — OFFICE VISIT (OUTPATIENT)
Dept: OCCUPATIONAL MEDICINE | Facility: HOSPITAL | Age: 40
End: 2025-03-12
Attending: STUDENT IN AN ORGANIZED HEALTH CARE EDUCATION/TRAINING PROGRAM
Payer: COMMERCIAL

## 2025-03-12 PROCEDURE — 97110 THERAPEUTIC EXERCISES: CPT

## 2025-03-12 NOTE — PROGRESS NOTES
Discharge Summary    Pt has attended 4 visits in Occupational Therapy.      Diagnosis:   Orthopedic aftercare (Z47.89)       Referring Provider: Luis Mayfield  Date of Evaluation:    11/5/2024    Precautions:  following [place] Hand Protocols Next MD visit:   none scheduled  Date of Surgery: 09/25/24   Insurance Primary/Secondary: BCBS OUT OF STATE / N/A     # Auth Visits: 20 visits 10/11-12/31             Subjective: Pt states that her numbness has gotten worse. She notices it with gripping and has instances where she feels she is going to     Pain: 0/10      Objective Data: See exercises flowsheet below for exercises and activities performed today. All exercises performed bilaterally.    Right   SF Eval  SF 11/21/2024 SF 12/5/2024 SF 12/23/2024  SF 1/13/2025 SF 1/29/2025 SF 3/12/2025   MP 0/35 0/60 0/65 0/65 0/65 0/70 0/75   PIP -24/25 -15/25 -20/25 -15/30 -15/30 -20/35 -15/40   DIP 0/0 0/5 0/5 0/10 0/15 0/25 0/25   BERGERON 36 75 75 90 95 110 125       Strength (lbs) Right Average 11/21/24 Right Average 12/23/2024 Right Average 1/29/2025 Right Average 3/12/2025   : 9 16 29  22 (-7)   2 pt Pinch: 5 5 6 (+1) 3 (-3)   3 pt Pinch: 5 6 9 (+3) 4 (-5)   Lateral Pinch: 5 9 (+4) 11 (+2) 3 (-8)         Assessment: Pt is scheduled to have a CT release and digital manipulation on 4/9, therefore, skilled OT will be discontinued for home. Symptoms of CT have impacted progression of strength and mobility as seen above. Pt met 6/10 goals. Intense pain and numbness occurs with composite flexion therefore BERGERON of R SF difficult to assess if improving more.  and pinch went down since progress note. OT will be discharging pt for further evaluation from the doctor and surgical intervention. Pt encouraged to return to skilled therapy once able after surgery.       Goals: 1x/week or a total of 4 visits   Pt will be independent and compliant with comprehensive HEP to maintain progress achieved in OT-MET  Pt will decrease their (R) SF  P1 edema by 0.5 cm or more for ease of grasping utensils.-MET  Pt will decrease their (R) MCP edema by 0.5 cm or more for ease of holding steering wheel-MET  Pt will increase their (R) wrist flexion and extension by 10 degrees for ease of dressing-MET  Pt will increase their (R) RF BERGERON to at least 200 degrees for ease of holding cup.-MET 1/29/2025  Pt will increase their (R) SF BERGERON to at least 200  degrees for ease of cupping medications.-NOT MET  Pt will decrease their QuickDASH score by 10% in order to demo improvements in functional independence.-NOT MET  Updated/Additional Goals:  Pt will increase their (R)  strength to at least 25# for ease of holding bow for hunting.-MET 1/29/2025  Pt will increase their (R) pinch strength scores by 3-5# for ease of opening bottles and jars.-NOT MET  Updated 1/29/2025:   Pt will increase their (R)  strength to at least 35# for ease of holding bow for hunting.NOT MET      Plan: discharge from therapy until manipulation  Date 11/7/2024   11/18/2024   11/21/2024 11/25/2024   12/3/2024  12/5/2024  12/12/2024  12/17/2024  12/23/2024  12/31/2024  1/7/2025  1/9/2025  1/13/2025  1/15/2025  1/20/2025  1/22/2025  1/29/2025  2/3/2025  2/27/2025 3/5/2025 3/12/2025   Visit # 2  3  4  5  6  7  8 9 10 11 12 13 14 15 16 17 18 1/4 additional visit 2/4 3/4 4/4                     Progress Note                Evaluation               8 more requested               Manual                                             PROM into composite flexion PROM into composite flexion 10x1, 10-20 sec holds PROM into composite flexion 10x1, 10-20 sec holds PROM into composite flexion 10x1, 10-20 sec holds    IASTM to volar palm and R SF (5 mins)   Tuning fork vibration scar massage (5 mins)   PROM with 10-20 second holds PROM with 10-20 second holds   IASTM to volar R SF (5 mins) IASTM to volar R SF (5 mins)  IASTM to volar R SF      Ther ex                                               Digital adduction  10x2    Intrinsic plus position 10x2    Hook curl 10x2    Joint blocking MP, PIP, DIP     Fluidotherapy with digital and wrist AROM (7 mins)    Composite fist rolls 20x1    Median nerve glides 10x1    Joint blocking MP, PIP, DIP 10x2   Fluidotherapy with digital and wrist AROM (7 mins)    Composite fist rolls 20x1    Digital adduction 20x1    Joint blocking MP, PIP, DIP 10x2    Fabrication of PIP flexion relative motion orthosis (5 mins) Fluidotherapy with digital and wrist AROM (7 mins)    Composite fist rolls 20x1    Digital adduction 20x1    Yellow putty ex:   5x3  -FDP glides  -lumbrical squeeze  -opposition  -adduction Paraffin with prolonged stretch of the R SF into flexion using coband (5 mins)    Composite fist rolls 20x1    Joint blocking PIP 20x1    Red putty ex: 10x2  -  -lumbrical squeeze  -opposition  -adduction     Paraffin with prolonged stretch of the R SF into flexion using coband (5 mins)    Composite flexion 20x2    Blue Web ex  -  -lumbrical squeeze  10x2    Yellow Flexbar 6# 10x2  -bilateral supination         Paraffin with prolonged stretch of the R SF into flexion using coband (5 mins)    Composite flexion 20x2    PIP flexion with orthosis 20x1    [place] cupping into thumb<>palm translation (8 mins)         Paraffin with prolonged stretch of the R SF into flexion using coband (5 mins)    Composite flexion 20x2    Table top 10x2    Hook curl 10x2    Full arm median nerve glides 10x1    Red Putty Ex:  -FDS/FDP glides 10x1  -tip pinch SF and thumb 10x2     Paraffin with prolonged stretch of the R SF into flexion using coband (5 mins)    Composite flexion 20x2    Red Putty Ex:  -  -adduction  -opposition  -digital extension  10x3 Paraffin with prolonged stretch of the R SF into flexion using coband (5 mins)    Composite flexion 20x2    [place] cupping into thumb<>palm translation (8 mins)    Blue Web Ex:   -lumbrical squeeze  10x2    Rubber band ex  -lumbrical  extension  -abduction  10x2 Paraffin with prolonged stretch of the R SF into flexion using coband (5 mins)    Composite flexion 20x2    Digital abd/aduction 10x2    Blue Web Ex:   -lumbrical squeeze  -Lumbrical extension  12x2          Prolonged flexion stretch orthosis fabrication initiation Paraffin with prolonged stretch of the R SF into flexion using coband (5 mins)    Joint blocking MP, PIP, DIP 10x2    Median nerve glides 10x2    Digital abd/aduction 10x2    Blue Web Ex:   -lumbrical squeeze  -Lumbrical extension  12x2    Low load Prolonged flexion stretch orthosis fabrication initiation completed Paraffin with prolonged stretch of the R SF into flexion using coband (5 mins)    Red putty  -FDP glides of SF into full composite FDP glides 10x2    Hook curls 10x3    Blue Web Ex:   -lumbrical squeeze  15x2 Paraffin with prolonged stretch of the R SF into flexion using coband (5 mins)    SF flexion and holds 10x3    Hook curls 10x3    Rubber band SF abd/adduction 10x3    SF rubber band extension 10x3    Relative motion orthosis PIP/DIP flexion 10x3    Blue Web  Paraffin with prolonged stretch of the R SF into flexion using coband (5 mins)    SF flexion and holds 10x3    Rubber band SF abd/adduction 10x3    SF rubber band extension 10x3    SF rubber band flexion 10x2    Wooden block stacking of 3s into cupping into palm for SF activation into flexion (5 mins) Paraffin with prolonged stretch of the R SF into flexion using coband (5 mins)    SF flexion and holds 10x3    PIP and DIP joint flexion blocking 10x2    Rubber band SF abd/adduction 10x3    SF rubber band extension 10x3    Black Web Ex:   -lumbrical squeeze  15x2    3# digi-flex 10x2 iso-digital flexion  Tendon glides 10x2    Rubber band SF abd/adduction 10x3    SF rubber band extension 10x3  Rubber band     Black Web Ex:   -lumbrical squeeze  15x2    Green pin velcro block removal with composite flexion    Red pin thumb and SF pinch removal of velcro  blocks    Red flexbar 10#   -bilateral sup/pro 10x2    Red flexbar 10#   - and bends into \"T\" shape   10x2 Paraffin with prolonged stretch of the R SF into flexion using coband (5 mins)    SF flexion into passive holds 15-20 seconds 5x1    Green Putty Ex:   10x2  -lumbrical squeeze  -opposition (3 rows)  -composite ulnar sided flexion (RF/SF) SF add/abduction 10x2    PIP and DIP flexion joint blocking 10x3    Blue Web ex:   -lumbrical squeeze  -digital extension  15x2    -[place] manipulation (5 marbles at a time) thumb<>palm translatiion    Median nerve glides 10x2    Wall slides into Y scapular retraction    Green Putty Ex:   10x2  -lumbrical squeeze  -opposition (3 rows)  -FDS glides into putty 10x1       SF add/abduction 10x2    PIP and DIP flexion joint blocking 10x3    Green Putty Ex:   -tip pinches 20x2  -lumbrical extension  -grasp and pulls 10x2    Median nerve glides 10x2     SF add/abduction 10x2    PIP and DIP flexion joint blocking 10x3    Median nerve glides 10x2    Wrist AROM: rd/UD, flex/extend 10x3    -[place] manipulation (5 marbles at a time) thumb<>palm translatiion   HEP instruction                                 Therapeutic Activity                                            Adduction of RF and SF sponge lifts and transports (5 mins)    Composite flexion cone grasp and pull of marbles across the table (10 marbles) Adduction of RF and SF large peg lifts and transports (5 mins)       Composite flexion cone grasp and stack (9 cones) 3 times                    Neuromuscular Re-education                                                                  NMES to forearm flexor for digital:   Pulse Duration: 150  Freq: 35 pps   Amplitude: 15  On/off time: 10/10  Duration: 8 mins   NMES to forearm flexor for digital:   Pulse Duration: 150  Freq: 35 pps   Amplitude: 15  On/off time: 10/10  Duration: 8 mins        Modalities                                        Moist Heat applied to digit (3 mins)                            Paraffin with R SF prolonged stretch (5 mins)      HEP:  Assignment date:   tendon glides, flexion joint blocking, and digital adduction 11/05/24   Median nerve glides 11/18/24           Access Code: [email or web address]  URL: [email or web address]  Date: 12/23/2024  Prepared by: Julissa TAYLOR MS, OTR/L    Exercises  - Discontinue   - Thumb Opposition with Putty  - 2 x daily - 7 x weekly - 2 sets - 10 reps  - Finger Adduction with Putty  - 2 x daily - 7 x weekly - 2 sets - 10 reps  - Key Pinch with Putty  - 2 x daily - 7 x weekly - 2 sets - 10 reps    Post QuickDASH Outcome Score  Post Score: (Patient-Rptd) 45.45 % (3/12/2025  8:06 AM)    -9.09 % improvement    Plan: discharge patient     Patient/Family/Caregiver was advised of these findings, precautions, and treatment options and has agreed to actively participate in planning and for this course of care.    Thank you for your referral. If you have any questions, please contact me at Dept: [phone].    Sincerely,  Electronically signed by therapist: Julissa Tidwell OT     Physician's certification required:  No  Please co-sign or sign and return this letter via fax as soon as possible to [phone].   I certify the need for these services furnished under this plan of treatment and while under my care.    X___________________________________________________ Date____________________    Certification From: 3/12/2025  To:6/10/2025        Charges: TE 3  Total Timed Treatment: 40 min  Total Treatment Time: 45 min

## 2025-03-20 ENCOUNTER — APPOINTMENT (OUTPATIENT)
Dept: OCCUPATIONAL MEDICINE | Facility: HOSPITAL | Age: 40
End: 2025-03-20
Attending: STUDENT IN AN ORGANIZED HEALTH CARE EDUCATION/TRAINING PROGRAM
Payer: COMMERCIAL

## 2025-03-20 NOTE — PROGRESS NOTES
[address] [facility], [address] [facility] [address]  [place] [address]  Dept: [phone]    9/23/2020   Bariatric Patient Follow-up Evaluation    Chief Complaint:  morbid obesity, preop 235, 9/14/20 Smokeless tobacco: Never Used    Substance and Sexual Activity      Alcohol use: Yes        Frequency: Monthly or less        Comment: socially       Drug use: Never      Medications:   Current Outpatient Medications:   •  UNABLE TO FIND, BARIATRIC CHOICE [facility] DIVISION OF KIDNEY DISEASES AND HYPERTENSION   FOLLOW UP NOTE  --------------------------------------------------------------------------------  Chief Complaint: ROB on CKD V    24 hour events/subjective: Pt. seen and examined at bedside earlier today with son and daughter present. Pt last had HD on 3/19/25. Pt sleeping, did not appear in distress.     PAST HISTORY  --------------------------------------------------------------------------------  No significant changes to PMH, PSH, FHx, SHx, unless otherwise noted    ALLERGIES & MEDICATIONS  --------------------------------------------------------------------------------  Allergies  Kiwi (Anaphylaxis)  codeine (Unknown)  penicillins (Anaphylaxis)    Intolerances    Standing Inpatient Medications  chlorhexidine 2% Cloths 1 Application(s) Topical daily  dextrose 5%. 1000 milliLiter(s) IV Continuous <Continuous>  dextrose 5%. 1000 milliLiter(s) IV Continuous <Continuous>  dextrose 50% Injectable 25 Gram(s) IV Push once  dextrose 50% Injectable 12.5 Gram(s) IV Push once  dextrose 50% Injectable 25 Gram(s) IV Push once  gabapentin 100 milliGRAM(s) Oral three times a day  heparin   Injectable 5000 Unit(s) SubCutaneous every 12 hours  influenza  Vaccine (HIGH DOSE) 0.5 milliLiter(s) IntraMuscular once  insulin lispro (ADMELOG) corrective regimen sliding scale   SubCutaneous Before meals and at bedtime  levothyroxine 50 MICROGram(s) Oral daily  lidocaine   4% Patch 1 Patch Transdermal daily  midodrine 10 milliGRAM(s) Oral every 8 hours  oxyCODONE    IR 2.5 milliGRAM(s) Oral every 6 hours  sodium bicarbonate 650 milliGRAM(s) Oral three times a day    PRN Inpatient Medications  acetaminophen     Tablet .. 650 milliGRAM(s) Oral every 6 hours PRN  aluminum hydroxide/magnesium hydroxide/simethicone Suspension 30 milliLiter(s) Oral every 4 hours PRN  dextrose Oral Gel 15 Gram(s) Oral once PRN  HYDROmorphone  Injectable 0.2 milliGRAM(s) IV Push every 4 hours PRN  melatonin 3 milliGRAM(s) Oral at bedtime PRN  sodium chloride 0.9% lock flush 10 milliLiter(s) IV Push every 1 hour PRN    REVIEW OF SYSTEMS  --------------------------------------------------------------------------------  Limited ROS, see HPI     VITALS/PHYSICAL EXAM  --------------------------------------------------------------------------------  T(C): 36.6 (03-20-25 @ 04:47), Max: 36.9 (03-19-25 @ 23:52)  HR: 85 (03-20-25 @ 04:47) (75 - 85)  BP: 117/73 (03-20-25 @ 04:47) (109/58 - 124/67)  RR: 18 (03-20-25 @ 04:47) (17 - 18)  SpO2: 98% (03-20-25 @ 04:47) (93% - 99%)  Wt(kg): --    03-19-25 @ 07:01  -  03-20-25 @ 07:00  --------------------------------------------------------  IN: 0 mL / OUT: 0 mL / NET: 0 mL    Physical Exam:  Gen: elderly, frail, chronically ill appearing.  Pulm: CTA B/L  CV: RRR, S1S2  Abd: +BS, soft  : No suprapubic tenderness  Extremities: +R>L  Neuro: sleeping.  Access: LIJ non-tunneled HD catheter    LABS/STUDIES  --------------------------------------------------------------------------------              9.0    19.86 >-----------<  64       [03-20-25 @ 07:07]              27.1     137  |  97  |  43  ----------------------------<  216      [03-20-25 @ 07:07]  3.8   |  25  |  2.46        Ca     8.6     [03-20-25 @ 07:07]      Mg     1.8     [03-20-25 @ 07:07]      Phos  3.9     [03-20-25 @ 07:07]    TPro  5.0  /  Alb  2.8  /  TBili  2.7  /  DBili  x   /  AST  114  /  ALT  87  /  AlkPhos  174  [03-20-25 @ 07:07]    PT/INR: PT 20.6 , INR 1.82       [03-18-25 @ 21:05]  PTT: 34.3       [03-18-25 @ 21:05]          [03-20-25 @ 07:07]    Creatinine Trend:  SCr 2.46 [03-20 @ 07:07]  SCr 3.53 [03-19 @ 07:13]  SCr 2.73 [03-17 @ 22:39]  SCr 3.81 [03-17 @ 00:37]  SCr 5.07 [03-16 @ 01:46]    Urine Sodium 61      [03-14-25 @ 17:00]  Urine Osmolality 377      [03-14-25 @ 17:00]    HBsAb <3.3      [03-15-25 @ 15:59]  HBsAb Nonreact      [03-15-25 @ 15:59]  HBsAg Nonreact      [03-15-25 @ 15:59]  HCV 0.06, Nonreact      [03-15-25 @ 15:59] cranial nerves grossly intact  Psych: alert and oriented, normal affect  Skin: warm, dry, she has a blood glucose sensing device in place right arm    Assessment: 38year old female with chronic morbid obesity, DM, HTN, HLP now s/p RYGB and doing well    P

## 2025-03-31 RX ORDER — NORGESTIMATE AND ETHINYL ESTRADIOL 7DAYSX3 LO
1 KIT ORAL DAILY
Qty: 84 TABLET | Refills: 0 | Status: SHIPPED | OUTPATIENT
Start: 2025-03-31 | End: 2025-06-23

## 2025-04-07 NOTE — DISCHARGE INSTRUCTIONS
HOME INSTRUCTIONS  AMBSURG HOME CARE INSTRUCTIONS: POST-OP ANESTHESIA  The medication that you received for sedation or general anesthesia can last up to 24 hours. Your judgment and reflexes may be altered, even if you feel like your normal self.      We Recommend:   Do not drive any motor vehicle or bicycle   Avoid mowing the lawn, playing sports, or working with power tools/applicances (power saws, electric knives or mixers)   That you have someone stay with you on your first night home   Do not drink alcohol or take sleeping pills or tranquilizers   Do not sign legal documents within 24 hours of your procedure   If you had a nerve block for your surgery, take extra care not to put any pressure on your arm or hand for 24 hours    It is normal:  For you to have a sore throat if you had a breathing tube during surgery (while you were asleep!). The sore throat should get better within 48 hours. You can gargle with warm salt water (1/2 tsp in 4 oz warm water) or use a throat lozenge for comfort  To feel muscle aches or soreness especially in the abdomen, chest or neck. The achy feeling should go away in the next 24 hours  To feel weak, sleepy or \"wiped out\". Your should start feeling better in the next 24 hours.   To experience mild discomforts such as sore lip or tongue, headache, cramps, gas pains or a bloated feeling in your abdomen.   To experience mild back pain or soreness for a day or two if you had spinal or epidural anesthesia.   If you had laparoscopic surgery, to feel shoulder pain or discomfort on the day of surgery.   For some patients to have nausea after surgery/anesthesia    If you feel nausea or experience vomiting:   Try to move around less.   Eat less than usual or drink only liquids until the next morning   Nausea should resolve in about 24 hours    If you have a problem when you are at home:    Call your surgeons office   Discharge Instructions: After Your Surgery  You’ve just had surgery. During  surgery, you were given medicine called anesthesia to keep you relaxed and free of pain. After surgery, you may have some pain or nausea. This is common. Here are some tips for feeling better and getting well after surgery.   Going home  Your healthcare provider will show you how to take care of yourself when you go home. They'll also answer your questions. Have an adult family member or friend drive you home. For the first 24 hours after your surgery:   Don't drive or use heavy equipment.  Don't make important decisions or sign legal papers.  Take medicines as directed.  Don't drink alcohol.  Have someone stay with you, if needed. They can watch for problems and help keep you safe.  Be sure to go to all follow-up visits with your healthcare provider. And rest after your surgery for as long as your provider tells you to.   Coping with pain  If you have pain after surgery, pain medicine will help you feel better. Take it as directed, before pain becomes severe. Also, ask your healthcare provider or pharmacist about other ways to control pain. This might be with heat, ice, or relaxation. And follow any other instructions your surgeon or nurse gives you.      Stay on schedule with your medicine.     Tips for taking pain medicine  To get the best relief possible, remember these points:   Pain medicines can upset your stomach. Taking them with a little food may help.  Most pain relievers taken by mouth need at least 20 to 30 minutes to start to work.  Don't wait till your pain becomes severe before you take your medicine. Try to time your medicine so that you can take it before starting an activity. This might be before you get dressed, go for a walk, or sit down for dinner.  Constipation is a common side effect of some pain medicines. Call your healthcare provider before taking any medicines such as laxatives or stool softeners to help ease constipation. Also ask if you should skip any foods. Drinking lots of fluids and  eating foods such as fruits and vegetables that are high in fiber can also help. Remember, don't take laxatives unless your surgeon has prescribed them.  Drinking alcohol and taking pain medicine can cause dizziness and slow your breathing. It can even be deadly. Don't drink alcohol while taking pain medicine.  Pain medicine can make you react more slowly to things. Don't drive or run machinery while taking pain medicine.  Your healthcare provider may tell you to take acetaminophen to help ease your pain. Ask them how much you're supposed to take each day. Acetaminophen or other pain relievers may interact with your prescription medicines or other over-the-counter (OTC) medicines. Some prescription medicines have acetaminophen and other ingredients in them. Using both prescription and OTC acetaminophen for pain can cause you to accidentally overdose. Read the labels on your OTC medicines with care. This will help you to clearly know the list of ingredients, how much to take, and any warnings. It may also help you not take too much acetaminophen. If you have questions or don't understand the information, ask your pharmacist or healthcare provider to explain it to you before you take the OTC medicine.   Managing nausea  Some people have an upset stomach (nausea) after surgery. This is often because of anesthesia, pain, or pain medicine, less movement of food in the stomach, or the stress of surgery. These tips will help you handle nausea and eat healthy foods as you get better. If you were on a special food plan before surgery, ask your healthcare provider if you should follow it while you get better. Check with your provider on how your eating should progress. It may depend on the surgery you had. These general tips may help:   Don't push yourself to eat. Your body will tell you when to eat and how much.  Start off with clear liquids and soup. They're easier to digest.  Next try semi-solid foods as you feel ready.  These include mashed potatoes, applesauce, and gelatin.  Slowly move to solid foods. Don’t eat fatty, rich, or spicy foods at first.  Don't force yourself to have 3 large meals a day. Instead eat smaller amounts more often.  Take pain medicines with a small amount of solid food, such as crackers or toast. This helps prevent nausea.  When to call your healthcare provider  Call your healthcare provider right away if you have any of these:   You still have too much pain, or the pain gets worse, after taking the medicine. The medicine may not be strong enough. Or there may be a complication from the surgery.  You feel too sleepy, dizzy, or groggy. The medicine may be too strong.  Side effects such as nausea or vomiting. Your healthcare provider may advise taking other medicines to .  Skin changes such as rash, itching, or hives. This may mean you have an allergic reaction. Your provider may advise taking other medicines.  The incision looks different (for instance, part of it opens up).  Bleeding or fluid leaking from the incision site, and weren't told to expect that.  Fever of 100.4°F (38°C) or higher, or as directed by your provider.  Call 911  Call 911 right away if you have:   Trouble breathing  Facial swelling    If you have obstructive sleep apnea   You were given anesthesia medicine during surgery to keep you comfortable and free of pain. After surgery, you may have more apnea spells because of this medicine and other medicines you were given. The spells may last longer than normal.    At home:  Keep using the continuous positive airway pressure (CPAP) device when you sleep. Unless your healthcare provider tells you not to, use it when you sleep, day or night. CPAP is a common device used to treat obstructive sleep apnea.  Talk with your provider before taking any pain medicine, muscle relaxants, or sedatives. Your provider will tell you about the possible dangers of taking these medicines.  Contact your  provider if your sleeping changes a lot even when taking medicines as directed.  Tito last reviewed this educational content on 10/1/2021  © 9920-4257 [employer]. All rights reserved. This information is not intended as a substitute for professional medical care. Always follow your healthcare professional's instructions.      Patient Discharge Instructions  Carpal Tunnel Release / Trigger Finger Release      WEIGHT BEARING: You will have a soft dressing on your hand for the first few days after surgery.  For the first 2 weeks after surgery, we recommend lifting no greater than 3 pounds or about the weight of a coffee cup.  You may use the hand to assist with hygiene but should limit your weightbearing to less than a coffee cup.  This will be progressed after your first postoperative visit.  During your recovery period, until you, regained normal sensation in your nerve, we recommend avoiding activities that previously bothered it including impact activities to the wrist, heavy vibration (e.g., lawn care, motorcycles), or extreme positions.    RANGE OF MOTION: Range of Motion of Fingers, Wrist, and Elbow as Tolerated. You may move your extremity as tolerated, gently to avoid irritation. Motion is important to avoid post-operative stiffness.     DIET:    Begin with bland foods (e.g., saltines, bread) and clear liquids (e.g., ginger ale, juice, water) immediately after surgery.  You may progress to normal diet if you are feeling nauseated  Nausea and vomiting are common after surgery.  Remain on clear liquids and bland diet until this passes.  This will typically resolve within the first 24 hours after surgery.  You have been provided a nausea medication which may help.  Should your nausea persist more than 24 hours despite this medication, contact your physician.    PAIN MEDICATIONS:   For many people, post-operative pain can be managed with simple measures, such as elevation of the operative  extremity above the level of the heart, cryotherapy and ice, compression, etc. DO NOT UNDERESTIMATE THE IMPORTANCE OF ELEVATION. When your operative site is in a dependent position (below the heart), you will notice significantly more swelling/throbbing/pain.   In addition to these measures, we have prescribed pain medications. To minimize narcotic use and establish better pain control, we employ a multimodal pain approach. This protocol combines the use of scheduled acetaminophen, gabapentin, and narcotics.  We will often use anti-inflammatories (NSAID's such as ibuprofen, celecoxib, and/or naproxen) to further assist with pain control thus allowing for a lower dose of narcotic to be used. Dosing of medications will vary from patient to patient based on their needs and past medical history, so please refer to your discharge medication list.  Opiate pain medications (oxycodone) will only be refilled after an in-person visit. For all other medication refills, please contact us using the contact information below    You have been prescribed:    Norco 5mg/325mg - Take one tablet by mouth as often as every 4-6 hours as needed for breakthrough pain. Take this medication as your breakthrough pain medication, after maximizing other pain medications. Opiate pain medications (Oxycodone, Hydrocodone, Oxycontin) are prescribed as a second-line pain medication for moderate to severe post-operative pain. Opiates are associated with dependency and addiction if taken for a prolonged period. For this reason, it is best to use opiates ONLY as needed. Please note that Norco contains acetaminopen    Senna-Docusate 8.6mg-50mg - Take 2 tabs by mouth every evening for constipation prevention. You should be having a bowel movement every 2-3 days.  If not, then proceed with over-the-counter laxatives (Docusate/Miralax), enemas, or suppositories to fix the constipation.  All of these are over the counter and can be discussed in more detail  with your pharmacist. If you are having multiple bowel movements daily, you should discontinue this medication.    ICE PACK/CRYOTHERAPY: Use frequently over the next 7-10 days.  Ice bags/packs/bladder should be used for 20-30 minutes every 3-4 hours during waking hours. Protect your skin from frostbite with a washcloth, towel, or ace wrap between the ice bag/pack and your skin.    DRESSINGS/WOUND CARE:   You may remove your dressings approximately 3 days after the surgery.  There are black sutures underneath, and these will be addressed at your follow-up appointment. If you feel that your wrap is too tight you may loosen it or you may call the office to be seen if needed.   Please keep your dressings clean and dry. Follow the bathing instructions below.   If you have increased drainage, incision redness, foul smell, or fevers - inform the office.  You may need to be evaluated in the office earlier than your follow-up appointment.    PHYSICAL/OCCUPATIONAL THERAPY (PT/OT): To maximize surgical benefit, PT/OT is not usually necessary. If you do not have an appointment, you should contact PT/OT to set up an appointment. If you would like to work with PT, please contact our nursing team.    BATHING:   You should keep your dressings dry (keep out of shower or bath or keep covered with water tight bag with securing bands) until you remove them approximately 3 days after surgery.  At that time, you may begin to shower without issues.   Do not scrub the wound.   Do not soak the wound/incision, use hot tubs or swimming pools, oceans, lakes, ponds until 3 weeks after surgery.   Following these guidelines will help avoid any wound healing issues and/or infections.    DVT PREVENTION:   Deep vein thrombosis (DVT) or blood clots sometimes occur following surgery. It is important to understand the signs/symptoms and methods to avoid DVTs.   Symptoms of DVT include swelling, throbbing and cramping in the extremity, swollen veins  that are hard or sore. DVTs can travel to the lungs and cause a pulmonary embolus (PE) and death. Symptoms of a pulmonary embolus include chest pain and shortness of breath. If you experience any of these symptoms you should contact the clinic immediately and/or go to the nearest emergency room.   To prevent blood clots, you should move your extremities and joints, limited by the restrictions above. Perform foot pumps, ankle circles, leg lifts, etc. to circulate blood in the extremity.   If you have been prescribed Aspirin, please take it as prescribed for DVT prophylaxis. If you plan to travel in a car or plane for long periods (> 1 hour), contact your surgeon regarding the need for DVT prophylaxis. If you have a history of blood clots, and have not been prescribed a medication, contact your surgeon immediately.     DRIVING: Driving after your surgery is dependent on several factors. You may not drive if you are taking opiate pain medications. You may not drive if you're physically unable to steer with 2 hands and press the brake with full force. If you are unsure whether you are safe to drive, you should visit your local DMV. We are not medically or legally responsible for an accident caused by unsafe driving.     POST-OPERATIVE APPOINTMENT: Your first post-operative appointment is scheduled for 10-14 days after the procedure. If you do not have an appointment scheduled yet, please contact our office.     SPECIAL INSTRUCTIONS: If you experience fevers greater than 100.4°F on two consecutive measurements or any fever over 102°F, chest pain, difficulty breathing, confusion, or an allergic reaction, return to your nearest emergency department as soon as possible. You may also call the emergency department to help you determine if you should come in. The [facility] Emergency Department phone number is [phone].     CONTACT INFORMATION: For any questions or concerns, please contact our nursing staff.        Luis Mayfield MD  Department of Orthopaedics

## 2025-04-09 ENCOUNTER — HOSPITAL ENCOUNTER (OUTPATIENT)
Facility: HOSPITAL | Age: 40
Setting detail: HOSPITAL OUTPATIENT SURGERY
Discharge: HOME | End: 2025-04-09
Attending: STUDENT IN AN ORGANIZED HEALTH CARE EDUCATION/TRAINING PROGRAM | Admitting: STUDENT IN AN ORGANIZED HEALTH CARE EDUCATION/TRAINING PROGRAM
Payer: COMMERCIAL

## 2025-04-09 ENCOUNTER — ANESTHESIA (OUTPATIENT)
Dept: SURGERY | Facility: HOSPITAL | Age: 40
End: 2025-04-09
Payer: COMMERCIAL

## 2025-04-09 ENCOUNTER — ANESTHESIA EVENT (OUTPATIENT)
Dept: SURGERY | Facility: HOSPITAL | Age: 40
End: 2025-04-09
Payer: COMMERCIAL

## 2025-04-09 ENCOUNTER — MED REC SCAN ONLY (OUTPATIENT)
Dept: INTERNAL MEDICINE CLINIC | Facility: CLINIC | Age: 40
End: 2025-04-09

## 2025-04-09 VITALS
SYSTOLIC BLOOD PRESSURE: 125 MMHG | HEART RATE: 81 BPM | DIASTOLIC BLOOD PRESSURE: 80 MMHG | HEIGHT: 57 IN | TEMPERATURE: 98 F | RESPIRATION RATE: 11 BRPM | WEIGHT: 142 LBS | BODY MASS INDEX: 30.63 KG/M2 | OXYGEN SATURATION: 100 %

## 2025-04-09 DIAGNOSIS — G56.01 CARPAL TUNNEL SYNDROME OF RIGHT WRIST: ICD-10-CM

## 2025-04-09 DIAGNOSIS — M25.641 FINGER STIFFNESS, RIGHT: Primary | ICD-10-CM

## 2025-04-09 LAB
B-HCG UR QL: NEGATIVE
GLUCOSE BLDC GLUCOMTR-MCNC: 103 MG/DL (ref 70–99)
GLUCOSE BLDC GLUCOMTR-MCNC: 98 MG/DL (ref 70–99)

## 2025-04-09 PROCEDURE — 01N50ZZ RELEASE MEDIAN NERVE, OPEN APPROACH: ICD-10-PCS | Performed by: STUDENT IN AN ORGANIZED HEALTH CARE EDUCATION/TRAINING PROGRAM

## 2025-04-09 PROCEDURE — 82962 GLUCOSE BLOOD TEST: CPT

## 2025-04-09 PROCEDURE — 0RSWXZZ REPOSITION RIGHT FINGER PHALANGEAL JOINT, EXTERNAL APPROACH: ICD-10-PCS | Performed by: STUDENT IN AN ORGANIZED HEALTH CARE EDUCATION/TRAINING PROGRAM

## 2025-04-09 PROCEDURE — 81025 URINE PREGNANCY TEST: CPT

## 2025-04-09 RX ORDER — NICOTINE POLACRILEX 4 MG
15 LOZENGE BUCCAL
Status: DISCONTINUED | OUTPATIENT
Start: 2025-04-09 | End: 2025-04-09

## 2025-04-09 RX ORDER — HYDROMORPHONE HYDROCHLORIDE 1 MG/ML
0.4 INJECTION, SOLUTION INTRAMUSCULAR; INTRAVENOUS; SUBCUTANEOUS EVERY 2 HOUR PRN
Refills: 0 | Status: CANCELLED | OUTPATIENT
Start: 2025-04-09

## 2025-04-09 RX ORDER — DEXTROSE MONOHYDRATE 25 G/50ML
50 INJECTION, SOLUTION INTRAVENOUS
Status: DISCONTINUED | OUTPATIENT
Start: 2025-04-09 | End: 2025-04-09

## 2025-04-09 RX ORDER — FAMOTIDINE 10 MG/ML
20 INJECTION, SOLUTION INTRAVENOUS ONCE
Status: COMPLETED | OUTPATIENT
Start: 2025-04-09 | End: 2025-04-09

## 2025-04-09 RX ORDER — MORPHINE SULFATE 10 MG/ML
6 INJECTION, SOLUTION INTRAMUSCULAR; INTRAVENOUS EVERY 10 MIN PRN
Status: DISCONTINUED | OUTPATIENT
Start: 2025-04-09 | End: 2025-04-09

## 2025-04-09 RX ORDER — ONDANSETRON 2 MG/ML
INJECTION INTRAMUSCULAR; INTRAVENOUS AS NEEDED
Status: DISCONTINUED | OUTPATIENT
Start: 2025-04-09 | End: 2025-04-09 | Stop reason: SURG

## 2025-04-09 RX ORDER — MIDAZOLAM HYDROCHLORIDE 1 MG/ML
INJECTION INTRAMUSCULAR; INTRAVENOUS AS NEEDED
Status: DISCONTINUED | OUTPATIENT
Start: 2025-04-09 | End: 2025-04-09 | Stop reason: SURG

## 2025-04-09 RX ORDER — MORPHINE SULFATE 4 MG/ML
4 INJECTION, SOLUTION INTRAMUSCULAR; INTRAVENOUS EVERY 10 MIN PRN
Status: DISCONTINUED | OUTPATIENT
Start: 2025-04-09 | End: 2025-04-09

## 2025-04-09 RX ORDER — NALOXONE HYDROCHLORIDE 0.4 MG/ML
0.08 INJECTION, SOLUTION INTRAMUSCULAR; INTRAVENOUS; SUBCUTANEOUS AS NEEDED
Status: DISCONTINUED | OUTPATIENT
Start: 2025-04-09 | End: 2025-04-09

## 2025-04-09 RX ORDER — SODIUM CHLORIDE, SODIUM LACTATE, POTASSIUM CHLORIDE, CALCIUM CHLORIDE 600; 310; 30; 20 MG/100ML; MG/100ML; MG/100ML; MG/100ML
INJECTION, SOLUTION INTRAVENOUS CONTINUOUS
Status: DISCONTINUED | OUTPATIENT
Start: 2025-04-09 | End: 2025-04-09

## 2025-04-09 RX ORDER — DEXAMETHASONE SODIUM PHOSPHATE 4 MG/ML
VIAL (ML) INJECTION AS NEEDED
Status: DISCONTINUED | OUTPATIENT
Start: 2025-04-09 | End: 2025-04-09 | Stop reason: SURG

## 2025-04-09 RX ORDER — ACETAMINOPHEN 500 MG
1000 TABLET ORAL EVERY 8 HOURS SCHEDULED
Status: CANCELLED | OUTPATIENT
Start: 2025-04-09

## 2025-04-09 RX ORDER — OXYCODONE HYDROCHLORIDE 5 MG/1
5 TABLET ORAL EVERY 4 HOURS PRN
Refills: 0 | Status: CANCELLED | OUTPATIENT
Start: 2025-04-09

## 2025-04-09 RX ORDER — HYDROMORPHONE HYDROCHLORIDE 1 MG/ML
0.2 INJECTION, SOLUTION INTRAMUSCULAR; INTRAVENOUS; SUBCUTANEOUS EVERY 5 MIN PRN
Status: DISCONTINUED | OUTPATIENT
Start: 2025-04-09 | End: 2025-04-09

## 2025-04-09 RX ORDER — FAMOTIDINE 20 MG/1
20 TABLET, FILM COATED ORAL ONCE
Status: COMPLETED | OUTPATIENT
Start: 2025-04-09 | End: 2025-04-09

## 2025-04-09 RX ORDER — HYDROCODONE BITARTRATE AND ACETAMINOPHEN 5; 325 MG/1; MG/1
1 TABLET ORAL EVERY 6 HOURS PRN
Qty: 12 TABLET | Refills: 0 | Status: SHIPPED | OUTPATIENT
Start: 2025-04-09

## 2025-04-09 RX ORDER — ACETAMINOPHEN 500 MG
1000 TABLET ORAL ONCE
Status: COMPLETED | OUTPATIENT
Start: 2025-04-09 | End: 2025-04-09

## 2025-04-09 RX ORDER — MORPHINE SULFATE 4 MG/ML
2 INJECTION, SOLUTION INTRAMUSCULAR; INTRAVENOUS EVERY 10 MIN PRN
Status: DISCONTINUED | OUTPATIENT
Start: 2025-04-09 | End: 2025-04-09

## 2025-04-09 RX ORDER — HYDROMORPHONE HYDROCHLORIDE 1 MG/ML
0.6 INJECTION, SOLUTION INTRAMUSCULAR; INTRAVENOUS; SUBCUTANEOUS EVERY 5 MIN PRN
Status: DISCONTINUED | OUTPATIENT
Start: 2025-04-09 | End: 2025-04-09

## 2025-04-09 RX ORDER — OXYCODONE HYDROCHLORIDE 5 MG/1
10 TABLET ORAL EVERY 4 HOURS PRN
Refills: 0 | Status: CANCELLED | OUTPATIENT
Start: 2025-04-09

## 2025-04-09 RX ORDER — LIDOCAINE HYDROCHLORIDE 10 MG/ML
INJECTION, SOLUTION EPIDURAL; INFILTRATION; INTRACAUDAL; PERINEURAL AS NEEDED
Status: DISCONTINUED | OUTPATIENT
Start: 2025-04-09 | End: 2025-04-09 | Stop reason: SURG

## 2025-04-09 RX ORDER — INSULIN ASPART 100 [IU]/ML
INJECTION, SOLUTION INTRAVENOUS; SUBCUTANEOUS ONCE
Status: DISCONTINUED | OUTPATIENT
Start: 2025-04-09 | End: 2025-04-09

## 2025-04-09 RX ORDER — SENNA AND DOCUSATE SODIUM 50; 8.6 MG/1; MG/1
2 TABLET, FILM COATED ORAL NIGHTLY
Qty: 28 TABLET | Refills: 0 | Status: SHIPPED | OUTPATIENT
Start: 2025-04-09

## 2025-04-09 RX ORDER — METOCLOPRAMIDE 10 MG/1
10 TABLET ORAL ONCE
Status: COMPLETED | OUTPATIENT
Start: 2025-04-09 | End: 2025-04-09

## 2025-04-09 RX ORDER — LABETALOL HYDROCHLORIDE 5 MG/ML
5 INJECTION, SOLUTION INTRAVENOUS EVERY 5 MIN PRN
Status: DISCONTINUED | OUTPATIENT
Start: 2025-04-09 | End: 2025-04-09

## 2025-04-09 RX ORDER — NICOTINE POLACRILEX 4 MG
30 LOZENGE BUCCAL
Status: DISCONTINUED | OUTPATIENT
Start: 2025-04-09 | End: 2025-04-09

## 2025-04-09 RX ORDER — HYDROMORPHONE HYDROCHLORIDE 1 MG/ML
0.4 INJECTION, SOLUTION INTRAMUSCULAR; INTRAVENOUS; SUBCUTANEOUS EVERY 5 MIN PRN
Status: DISCONTINUED | OUTPATIENT
Start: 2025-04-09 | End: 2025-04-09

## 2025-04-09 RX ORDER — HYDROMORPHONE HYDROCHLORIDE 1 MG/ML
0.8 INJECTION, SOLUTION INTRAMUSCULAR; INTRAVENOUS; SUBCUTANEOUS EVERY 2 HOUR PRN
Refills: 0 | Status: CANCELLED | OUTPATIENT
Start: 2025-04-09

## 2025-04-09 RX ORDER — ONDANSETRON 2 MG/ML
4 INJECTION INTRAMUSCULAR; INTRAVENOUS EVERY 6 HOURS PRN
Status: DISCONTINUED | OUTPATIENT
Start: 2025-04-09 | End: 2025-04-09

## 2025-04-09 RX ORDER — GLYCOPYRROLATE 0.2 MG/ML
INJECTION, SOLUTION INTRAMUSCULAR; INTRAVENOUS AS NEEDED
Status: DISCONTINUED | OUTPATIENT
Start: 2025-04-09 | End: 2025-04-09 | Stop reason: SURG

## 2025-04-09 RX ORDER — HYDROCODONE BITARTRATE AND ACETAMINOPHEN 5; 325 MG/1; MG/1
1 TABLET ORAL ONCE
Refills: 0 | Status: COMPLETED | OUTPATIENT
Start: 2025-04-09 | End: 2025-04-09

## 2025-04-09 RX ORDER — METOCLOPRAMIDE HYDROCHLORIDE 5 MG/ML
10 INJECTION INTRAMUSCULAR; INTRAVENOUS ONCE
Status: COMPLETED | OUTPATIENT
Start: 2025-04-09 | End: 2025-04-09

## 2025-04-09 RX ORDER — PROCHLORPERAZINE EDISYLATE 5 MG/ML
5 INJECTION INTRAMUSCULAR; INTRAVENOUS EVERY 8 HOURS PRN
Status: DISCONTINUED | OUTPATIENT
Start: 2025-04-09 | End: 2025-04-09

## 2025-04-09 RX ORDER — ONDANSETRON 2 MG/ML
4 INJECTION INTRAMUSCULAR; INTRAVENOUS EVERY 6 HOURS PRN
Status: CANCELLED | OUTPATIENT
Start: 2025-04-09

## 2025-04-09 RX ADMIN — DEXAMETHASONE SODIUM PHOSPHATE 8 MG: 4 MG/ML VIAL (ML) INJECTION at 15:36:00

## 2025-04-09 RX ADMIN — GLYCOPYRROLATE 0.2 MG: 0.2 INJECTION, SOLUTION INTRAMUSCULAR; INTRAVENOUS at 15:36:00

## 2025-04-09 RX ADMIN — ONDANSETRON 4 MG: 2 INJECTION INTRAMUSCULAR; INTRAVENOUS at 15:36:00

## 2025-04-09 RX ADMIN — MIDAZOLAM HYDROCHLORIDE 2 MG: 1 INJECTION INTRAMUSCULAR; INTRAVENOUS at 15:26:00

## 2025-04-09 RX ADMIN — LIDOCAINE HYDROCHLORIDE 50 MG: 10 INJECTION, SOLUTION EPIDURAL; INFILTRATION; INTRACAUDAL; PERINEURAL at 15:34:00

## 2025-04-09 NOTE — H&P
Orthopaedic Outpatient  Surgery History & Physical  _______________________________________________________________________________________________________________  _______________________________________________________________________________________________________________    Gisell Zepeda Patient Status:  Hospital Outpatient Surgery    1985 MRN [id number]   [facility] PRE OP RECOVERY Attending Lusi Mayfield MD     DATE: 2025     CHIEF COMPLAINT: Right hand numbness and stiffness     History provided by:patient  HISTORY OF PRESENT ILLNESS: Gisell Zepeda is a 39 year old female who presents as a patient to outpatient surgery for her right hand. She has persistent neurologic symptoms in her right hand in the median nerve distribution. These remain refractory to conservative measures. She additionally has persistent stiffness of the small finger. Since her last visit she denies substantial changes to her health.    SOCIAL HISTORY  Social History     Socioeconomic History    Marital status: Single     Spouse name: Not on file    Number of children: Not on file    Years of education: Not on file    Highest education level: Not on file   Occupational History    Occupation: HR.    Tobacco Use    Smoking status: Never     Passive exposure: Never    Smokeless tobacco: Never   Vaping Use    Vaping status: Never Used   Substance and Sexual Activity    Alcohol use: Yes     Comment: socially     Drug use: Never    Sexual activity: Not on file   Other Topics Concern    Not on file   Social History Narrative    Not on file     Social Drivers of Health     Food Insecurity: Not on file   Transportation Needs: Not on file   Stress: Not on file   Housing Stability: Not on file        PAST MEDICAL HISTORY  Past Medical History[1]     PAST SURGICAL HISTORY  Past Surgical History[2]     MEDICATIONS  Reviewed     ALLERGIES  Allergies[3]     FAMILY HISTORY  Family History[4]     REVIEW OF SYSTEMS  A 14 point  review of systems was performed. Pertinent positives and negatives noted in the HPI.    PHYSICAL EXAM  /75 (BP Location: Right arm)   Pulse 70   Temp 98.3 °F (36.8 °C) (Oral)   Resp 18   Ht 4' 9\" (1.448 m)   Wt 142 lb (64.4 kg)   LMP 03/24/2025   SpO2 100%   BMI 30.73 kg/m²      Constitutional: The patient is well-developed, well-nourished, in no acute distress.  Neurological: Alert and oriented to person, place, and time.  Psychiatric: Mood and affect normal.  Head: Normocephalic and atraumatic.  Cardiovascular: regular rate by palpation  Pulmonary/Chest: Effort normal. No respiratory distress. Breathing non-labored  Abdominal: Abdomen exhibits no distension.   Right  Wrist/Hand  Inspection/Palpation  No readily apparent visual abnormalities of the wrist or elbow.   No Thenar atrophy  No Hypothenar atrophy  No ecchymosis, erythema, or effusion.   No breaks in skin. Skin is euthermic  Focally tender to palpation to no isolated point  ROM  Elbow motion: 0 extension to 140 flexion  Wrist Flexion-extension: 80 degrees flexion - 80 degrees extension  Able to make full composite fist  Diminished extension of the small finger PIP joint  Motor  Fires AIN/PIN/U/M/recurrent motor branch   Sensory  SILT radial nerve distribution  Moderate alterations in median nerve distribution  No alterations in ulnar nerve distribution  2+ radial pulse, negative herman's test, <2s capillary refill in all digits     LAB RESULTS  Lab Results   Component Value Date    WBC 6.2 09/25/2024    HGB 8.5 (L) 09/25/2024    HCT 30.4 (L) 09/25/2024    .0 09/25/2024    CREATSERUM 0.66 09/25/2024    BUN 15 09/25/2024     09/25/2024    K 3.8 09/25/2024     09/25/2024    CO2 28.0 09/25/2024    GLU 95 09/25/2024    CA 8.9 09/25/2024    ALB 4.3 09/25/2024    ALKPHO 57 09/25/2024    BILT 0.3 09/25/2024    TP 7.1 09/25/2024    AST 16 09/25/2024    ALT 9 (L) 09/25/2024    INR 1.00 07/01/2023    TSH 0.756 09/25/2024    MG 2.0  05/01/2020    PHOS 4.0 05/01/2020    B12 937 07/01/2023       ASSESSMENT/PLAN: Gisell Zepeda is a 39 year old female who presents for R CTS and small finger flexion contracture. Gisell Zepeda  meets appropriate indications to undergo surgical intervention for the above diagnosis and this option was discussed in detail again today. After discussion of risks, benefits, and alternatives, they elected to proceed with surgery as planned and a witnessed consent form was signed. The operative site was marked with an indelible marker. They were offered the opportunity to ask all additional questions, which were answered to their satisfaction.    NPO for OR  Surgery planned: R CTR and small finger JOSE  Planned for: Same Day Discharge  Post-operative medications ordered  PRE-OP WEIGHT BEARING STATUS: Weightbearing as tolerated  POST-OP WEIGHT BEARING PLAN: Partial weightbearing (<5lbs)    I have personally seen Gisell Zepeda and discussed in detail their plan of care.   Please note that this note was written in combination with voice recognition/dictation software and there is a possibility of transcription errors which were not identified at the time of note submission. If clarification is necessary, please contact the author or clinic staff.    Luis Mayfield MD  Orthopaedic Surgery  4/9/2025           [1]   Past Medical History:   Abrasion of toe of left foot    Class 3 severe obesity due to excess calories with serious comorbidity and body mass index (BMI) of 40.0 to 44.9 in adult (HCC)    Diabetes (HCC)    Diabetes mellitus, type 2 (HCC)    Essential hypertension    High blood pressure    High cholesterol    High cholesterol    History of blood transfusion    no reactions    Hypercholesterolemia with hypertriglyceridemia    Intertrigo    Morbid (severe) obesity due to excess calories (HCC)    Morbid obesity with BMI of 45.0-49.9, adult (HCC)    Morbid obesity with BMI of 50.0-59.9, adult (HCC)    S/P gastric bypass     Uncontrolled type 2 diabetes mellitus with hyperglycemia (HCC)    Visual impairment    GLASSES   [2]   Past Surgical History:  Procedure Laterality Date    Hc surgery catheter eps dx/ablation other than 3d c1733      abdominoplasty, removal of excess skin on bilateral arms    Lap gastric bypass/nayan-en-y  09/14/2020    Dr Black, [facility]   [3] No Known Allergies  [4]   Family History  Problem Relation Age of Onset    Diabetes Mother     Breast Cancer Mother 62    Hypertension Father     Diabetes Father     Other (EToHism. ) Maternal Grandmother     Diabetes Paternal Grandmother     Hypertension Paternal Grandmother     Lipids Paternal Grandmother     Renal Disease Paternal Grandmother     Diabetes Paternal Grandfather         Type 1, IDDM with retinoapthy.     Stroke Paternal Grandfather     Other (PVD) Paternal Grandfather     Other (retinopathy) Paternal Grandfather     Breast Cancer Paternal Aunt

## 2025-04-09 NOTE — ANESTHESIA PREPROCEDURE EVALUATION
Anesthesia PreOp Note    HPI:     Gisell Zepeda is a 39 year old female who presents for preoperative consultation requested by: Luis Mayfield MD    Date of Surgery: 4/9/2025    Procedure(s):  Right carpal tunnel release and small finger manipulation under anesthesia  Indication: Carpal tunnel syndrome of right wrist [G56.01]    Relevant Problems   No relevant active problems       NPO:  Last Liquid Consumption Date: 04/09/25  Last Liquid Consumption Time: 1015  Last Solid Consumption Date: 04/08/25  Last Solid Consumption Time: 2100  Last Liquid Consumption Date: 04/09/25          History Review:  Patient Active Problem List    Diagnosis Date Noted    Carpal tunnel syndrome of right wrist 02/07/2025    Finger stiffness, right 02/07/2025    Orthopedic aftercare 10/14/2024    Anemia 09/26/2024    Encounter for screening mammogram for breast cancer 09/24/2024    Closed displaced fracture of proximal phalanx of right little finger 09/23/2024    Overweight (BMI 25.0-29.9) 06/03/2024    Mild protein-calorie malnutrition (HCC) 06/03/2024    Macular drusen 11/21/2023    Vaccine counseling 09/19/2023    Adult general medical exam 09/19/2023    Pap smear for cervical cancer screening 09/19/2023    S/P panniculectomy 12/15/2022    Intertrigo 02/03/2022    Vitamin D deficiency 02/03/2022    Chronic midline low back pain without sciatica 10/22/2021    Mild nonproliferative diabetic retinopathy of both eyes without macular edema associated with type 2 diabetes mellitus (HCC) 02/18/2021    Moderate nonproliferative diabetic retinopathy (HCC) 02/06/2021    S/P gastric bypass 09/17/2020    Type 2 diabetes mellitus with hypoglycemia without coma, without long-term current use of insulin (HCC) 09/14/2020    Melasma 06/18/2020    Hypercholesterolemia with hypertriglyceridemia 02/13/2020    Migraine with aura, not intractable, without status migrainosus 10/03/2018    Essential hypertension 06/10/2018       Past Medical  History[1]    Past Surgical History[2]    Prescriptions Prior to Admission[3]  Current Medications and Prescriptions Ordered in Epic[4]    Allergies[5]    Family History[6]  Social Hx on file[7]    Available pre-op labs reviewed.  Lab Results   Component Value Date    URINEPREG Negative 04/09/2025     Lab Results   Component Value Date    PGLU 98 04/09/2025          Vital Signs:  Body mass index is 30.73 kg/m².   height is 1.448 m (4' 9\") and weight is 64.4 kg (142 lb). Her oral temperature is 98.3 °F (36.8 °C). Her blood pressure is 133/75 and her pulse is 70. Her respiration is 18 and oxygen saturation is 100%.   Vitals:    04/08/25 0905 04/09/25 1313   BP:  133/75   Pulse:  70   Resp:  18   Temp:  98.3 °F (36.8 °C)   TempSrc:  Oral   SpO2:  100%   Weight: 63 kg (139 lb) 64.4 kg (142 lb)   Height: 1.448 m (4' 9\") 1.448 m (4' 9\")        Anesthesia Evaluation     Patient summary reviewed and Nursing notes reviewed    No history of anesthetic complications   Airway   Mallampati: II  TM distance: >3 FB  Neck ROM: full  Dental - Dentition appears grossly intact     Pulmonary - negative ROS and normal exam   Cardiovascular - negative ROS and normal exam  Exercise tolerance: good    ROS comment: hyperlipidemia    Neuro/Psych - negative ROS   (+)  neuromuscular disease,        GI/Hepatic/Renal - negative ROS     Endo/Other    (+) diabetes mellitus type 2 well controlled  Abdominal  - normal exam                 Anesthesia Plan:   ASA:  2  Plan:   General  Airway:  LMA  Post-op Pain Management: IV analgesics and Oral pain medication  Informed Consent Plan and Risks Discussed With:  Patient  Discussed plan with:  CRNA      I have informed Gisell Zepeda of the nature of the anesthetic plan, benefits, risks including possible dental damage if relevant, major complications, and any alternative forms of anesthetic management.   All of the patient's questions were answered to the best of my ability. The patient desires the  anesthetic management as planned.  PATRICIA RIVAS MD  4/9/2025 2:55 PM  Present on Admission:  **None**           [1]   Past Medical History:   Abrasion of toe of left foot    Class 3 severe obesity due to excess calories with serious comorbidity and body mass index (BMI) of 40.0 to 44.9 in adult (HCC)    Diabetes (HCC)    Diabetes mellitus, type 2 (HCC)    Essential hypertension    High blood pressure    High cholesterol    High cholesterol    History of blood transfusion    no reactions    Hypercholesterolemia with hypertriglyceridemia    Intertrigo    Morbid (severe) obesity due to excess calories (HCC)    Morbid obesity with BMI of 45.0-49.9, adult (HCC)    Morbid obesity with BMI of 50.0-59.9, adult (HCC)    S/P gastric bypass    Uncontrolled type 2 diabetes mellitus with hyperglycemia (HCC)    Visual impairment    GLASSES   [2]   Past Surgical History:  Procedure Laterality Date    Hc surgery catheter eps dx/ablation other than 3d c1733      abdominoplasty, removal of excess skin on bilateral arms    Lap gastric bypass/nayan-en-y  09/14/2020    Dr Black, [facility]   [3]   Medications Prior to Admission   Medication Sig Dispense Refill Last Dose/Taking    Norgestim-Eth Estrad Triphasic 0.18/0.215/0.25 MG-25 MCG Oral Tab Take 1 tablet by mouth daily. 84 tablet 0 4/8/2025 Evening    Phentermine HCl 15 MG Oral Cap Take 1 capsule (15 mg total) by mouth every morning. 30 capsule 5 2/27/2025    semaglutide (OZEMPIC, 0.25 OR 0.5 MG/DOSE,) 2 MG/3ML Subcutaneous Solution Pen-injector Inject 0.5 mg into the skin once a week. 9 mL 1 2/23/2025    atorvastatin 10 MG Oral Tab Take 1 tablet (10 mg total) by mouth nightly. 90 tablet 3 Past Month    metFORMIN 500 MG Oral Tab Take 1.5 tabs before breakfast and 1.5 tabs before dinner 270 tablet 0 4/8/2025 Morning    Acetaminophen 500 MG Oral Cap Take 2 capsules (1,000 mg total) by mouth every 8 (eight) hours as needed for Pain. Never exceed 3000 mg in a 24-hour period.   Many over-the-counter medications also have acetaminophen in them. 180 capsule 0 Past Month    Vitamin C 500 MG Oral Tab Take 1 tablet (500 mg total) by mouth in the morning and 1 tablet (500 mg total) before bedtime. 84 tablet 0 Past Month    Glucose Blood (ONETOUCH ULTRA) In Vitro Strip CHECK BLOOD SUGAR TWICE DAILY 200 strip 3 4/9/2025    fluconazole 200 MG Oral Tab Take 4 tablets (800 mg total) by mouth daily. 28 tablet 0 Past Month    Blood Glucose Monitoring Suppl (ONETOUCH ULTRA 2) w/Device Does not apply Kit Check blood sugar 2 times daily 1 kit 0 4/9/2025    Continuous Blood Gluc  (DEXCOM G6 ) Does not apply Device 1 each by Does not apply route See Admin Instructions. 1 Device 0 4/9/2025    Continuous Blood Gluc Sensor (DEXCOM G6 SENSOR) Does not apply Misc 1 each by Does not apply route See Admin Instructions. 9 each 3 4/9/2025    Continuous Blood Gluc Transmit (DEXCOM G6 TRANSMITTER) Does not apply Misc 1 each by Does not apply route See Admin Instructions. 3 each 3 4/9/2025    Multiple Vitamin (MULTIVITAMINS OR) Take 1 tablet by mouth in the morning. Bariatric vitamins.   4/7/2025    ondansetron (ZOFRAN) 4 mg tablet Take 1 tablet (4 mg total) by mouth every 8 (eight) hours as needed for Nausea. 10 tablet 0 More than a month    senna-docusate 8.6-50 MG Oral Tab Take 2 tablets by mouth at bedtime. 28 tablet 0 More than a month    Lancets Does not apply Misc Check blood sugar twice daily. 50 each 2    [4]   Current Facility-Administered Medications Ordered in Epic   Medication Dose Route Frequency Provider Last Rate Last Admin    lactated ringers infusion   Intravenous Continuous Luis Mayfield MD 20 mL/hr at 04/09/25 1331 New Bag at 04/09/25 1331    ceFAZolin (Ancef) 2g in 10mL IV syringe premix  2 g Intravenous Once Luis Mayfield MD         No current [facility]-ordered outpatient medications on file.   [5] No Known Allergies  [6]   Family History  Problem Relation Age of Onset    Diabetes  Mother     Breast Cancer Mother 62    Hypertension Father     Diabetes Father     Other (EToHism. ) Maternal Grandmother     Diabetes Paternal Grandmother     Hypertension Paternal Grandmother     Lipids Paternal Grandmother     Renal Disease Paternal Grandmother     Diabetes Paternal Grandfather         Type 1, IDDM with retinoapthy.     Stroke Paternal Grandfather     Other (PVD) Paternal Grandfather     Other (retinopathy) Paternal Grandfather     Breast Cancer Paternal Aunt    [7]   Social History  Socioeconomic History    Marital status: Single   Occupational History    Occupation: HR.    Tobacco Use    Smoking status: Never     Passive exposure: Never    Smokeless tobacco: Never   Vaping Use    Vaping status: Never Used   Substance and Sexual Activity    Alcohol use: Yes     Comment: socially     Drug use: Never

## 2025-04-09 NOTE — ANESTHESIA POSTPROCEDURE EVALUATION
Patient: Gisell Zepeda    Procedure Summary       Date: 04/09/25 Room / Location: [facility] MAIN OR 06 / [facility] MAIN OR    Anesthesia Start: 1526 Anesthesia Stop: 1613    Procedure: Right carpal tunnel release and small finger manipulation under anesthesia (Right: Hand) Diagnosis:       Carpal tunnel syndrome of right wrist      (Carpal tunnel syndrome of right wrist [G56.01])    Surgeons: Luis Mayfield MD Anesthesiologist: Cam Paula MD    Anesthesia Type: general ASA Status: 2            Anesthesia Type: general    Vitals Value Taken Time   /57 04/09/25 16:07   Temp 97.3 °F (36.3 °C) 04/09/25 16:07   Pulse 89 04/09/25 16:13   Resp 13 04/09/25 16:13   SpO2 98 % 04/09/25 16:13   Vitals shown include unfiled device data.    [facility] AN Post Evaluation:   Patient Evaluated in PACU  Patient Participation: complete - patient participated  Level of Consciousness: awake and alert  Pain Score: 0  Pain Management: adequate  Airway Patency:patent  Yes    Nausea/Vomiting: none  Cardiovascular Status: acceptable  Respiratory Status: acceptable  Postoperative Hydration acceptable      Lacey Bagley CRNA  4/9/2025 4:13 PM

## 2025-04-09 NOTE — ANESTHESIA PROCEDURE NOTES
Airway  Date/Time: 4/9/2025 3:35 PM  Reason: Elective      General Information and Staff   Patient location during procedure: OR  Resident/CRNA: Lcaey Bagley CRNA  Performed: CRNA   Performed by: Lacey Bagley CRNA  Authorized by: Cam Paula MD        Indications and Patient Condition  Indications for airway management: anesthesia  Sedation level: deep      Preoxygenated: yesPatient position: sniffing    Mask difficulty assessment: 1 - vent by mask    Final Airway Details    Final airway type: supraglottic airway      Successful airway: classic  Size: 4     Number of attempts at approach: 1  Number of other approaches attempted: 0    Additional Comments  Dentition and oral mucosa per preop assessmen,t post lma insertion. Head/neck remained in neutral position.

## 2025-04-10 ENCOUNTER — APPOINTMENT (OUTPATIENT)
Dept: OCCUPATIONAL MEDICINE | Facility: HOSPITAL | Age: 40
End: 2025-04-10
Attending: STUDENT IN AN ORGANIZED HEALTH CARE EDUCATION/TRAINING PROGRAM
Payer: COMMERCIAL

## 2025-04-10 NOTE — OPERATIVE REPORT
PATIENT NAME: Gisell Zepeda   DATE OF OPERATION: 4/9/2025      PREOPERATIVE DIAGNOSIS: Right carpal tunnel syndrome and small finger contracture  POSTOPERATIVE DIAGNOSIS:   Right carpal tunnel syndrome and small finger contracture     PROCEDURE PERFORMED:    1.  Right wrist open carpal tunnel release  2.  Right small finger manipulation under anesthesia     STAFF SURGEON:  Luis Mayfield MD   FIRST ASSISTANT: Kecia Tan CSA  ANESTHESIA:  General    ANTIBIOTICS:   Ancef 2 grams.     IMPLANTS:  * No implants in log *     COMPLICATIONS:  None.   CONDITION:  Stable to post anesthesia care unit.      INDICATION FOR PROCEDURE: Gisell Zepeda   is a 39 year old female who presented with the above diagnosis.  The patient attempted conservative management including immobilization, therapy, anti-inflammatories, and aggressive awake manipulation without substantial improvement in symptoms.  We discussed the risks and benefits of operative versus nonoperative management. The risks of nonoperative management specifically the risk of loss of strength and sensation and persistent pain were discussed and the patient elected to undergo operative treatment.  We discussed benefits of the surgery including return of function, sensation, and for pain management. We discussed alternative options including nonoperative management. We additionally discussed the risks of surgery, which include, but are not limited to bleeding, pain, infection, damage to nerves/vessels/tendons, incomplete relief of symptoms, incomplete return of function, need for additional surgery, blood clots, and death. The patient understands the above risks and elected to proceed.     DESCRIPTION OF THE OPERATION:  On the day of the procedure, the patient was met in the preoperative holding area where the consent form was verified and the correct patient, laterality and procedure were identified and found to be correct.  The operative extremity was marked with  indelible ink and the patient was then met by the anesthesia provider. The patient was subsequently transferred to the operating room and placed in the supine position on the operating room table with a hand table attachment.  All bony prominences were well padded. Once adequate anesthesia was induced and prophylactic antibiotics were infused, a tourniquet was placed high on the arm. The arm was then prepped and draped in the usual sterile fashion.      A surgical time-out was performed prior to the initiation of the surgery and the consent form was verified and the patient, laterality, and correct procedure were verified and found to be correct.  An Esmarch bandage was used to exsanguinate the Right upper extremity and the tourniquet was inflated to 200 mmHg.      Open Carpal Tunnel Release    Incision was made using the ulnar border of the flexed ring finger as a reference point, in line with the shaft of the radius, not extending distal to Field's cardinal line, and extending approximately 2 cm from the wrist crease. We used sharp dissection to expose the palmar fascia, incised the fascia exposing and identifying the transverse carpal ligament deep to it. Using freer we further cleared it to visualize.  We then sharply released the ligament along the ulnar border, using a freer on top of the median nerve. The ligament was released under direct visualization proximal and distal, and then we palpated to confirm complete release. The ligament was noted to be thickened and median nerve was noted to be hyperemic. We then irrigated, verified hemostasis, and closed the skin using interrupted nylon sutures in a horizontal mattress fashion.  Median nerve block was then placed of lidocaine and Marcaine    Manipulation Under Anesthesia  Attention was then turned to the small finger this was noted to have approximately 40 degree contracture at the PIP joint with slight contracture at the DIP joint.  An aggressive  manipulation under anesthesia was performed, stabilizing the bone in order to prevent fracture or dislocation from occurring.  This achieved a release of the soft tissues with increase in motion to a composite fist function of the small finger to approximately the same as other fingers.    The patient was placed in a sterile dressing of xeroform, fluffs, webril and an ace wrap. The patient was transferred to the PACU in stable condition     POSTOPERATIVE INSTRUCTIONS:   - Progressive WB  - Discharge to home  - Pain protocol including elevation, icing, acetaminophen, NSAIDs, gabapentin, and opiates as needed  - Patient will follow up with me at two weeks for suture removal    First Assist Necessity and Involvement     For this procedure, a surgical assistant was present for, and assisted with, the entirety of the case. The surgical assistant was involved with patient positioning, prepping and draping, directly assisting with key portions of the case including retracting and/or managing instrumentation, and closing. The surgical assistant was necessary to ensure greater likelihood of a safe and efficient operation, and no Orthopaedic Surgery resident was available to operate as an assistant.

## 2025-04-14 ENCOUNTER — APPOINTMENT (OUTPATIENT)
Dept: OCCUPATIONAL MEDICINE | Facility: HOSPITAL | Age: 40
End: 2025-04-14
Attending: STUDENT IN AN ORGANIZED HEALTH CARE EDUCATION/TRAINING PROGRAM
Payer: COMMERCIAL

## 2025-04-14 ENCOUNTER — TELEPHONE (OUTPATIENT)
Dept: PHYSICAL THERAPY | Facility: HOSPITAL | Age: 40
End: 2025-04-14

## 2025-04-15 ENCOUNTER — APPOINTMENT (OUTPATIENT)
Dept: OCCUPATIONAL MEDICINE | Facility: HOSPITAL | Age: 40
End: 2025-04-15
Attending: STUDENT IN AN ORGANIZED HEALTH CARE EDUCATION/TRAINING PROGRAM
Payer: COMMERCIAL

## 2025-04-15 ENCOUNTER — OFFICE VISIT (OUTPATIENT)
Dept: PHYSICAL THERAPY | Age: 40
End: 2025-04-15
Attending: STUDENT IN AN ORGANIZED HEALTH CARE EDUCATION/TRAINING PROGRAM
Payer: COMMERCIAL

## 2025-04-16 ENCOUNTER — APPOINTMENT (OUTPATIENT)
Dept: PHYSICAL THERAPY | Age: 40
End: 2025-04-16
Attending: STUDENT IN AN ORGANIZED HEALTH CARE EDUCATION/TRAINING PROGRAM
Payer: COMMERCIAL

## 2025-04-16 ENCOUNTER — APPOINTMENT (OUTPATIENT)
Dept: OCCUPATIONAL MEDICINE | Facility: HOSPITAL | Age: 40
End: 2025-04-16
Attending: STUDENT IN AN ORGANIZED HEALTH CARE EDUCATION/TRAINING PROGRAM
Payer: COMMERCIAL

## 2025-04-17 ENCOUNTER — OFFICE VISIT (OUTPATIENT)
Dept: PHYSICAL THERAPY | Age: 40
End: 2025-04-17
Attending: STUDENT IN AN ORGANIZED HEALTH CARE EDUCATION/TRAINING PROGRAM
Payer: COMMERCIAL

## 2025-04-17 ENCOUNTER — APPOINTMENT (OUTPATIENT)
Dept: OCCUPATIONAL MEDICINE | Facility: HOSPITAL | Age: 40
End: 2025-04-17
Attending: STUDENT IN AN ORGANIZED HEALTH CARE EDUCATION/TRAINING PROGRAM
Payer: COMMERCIAL

## 2025-04-17 DIAGNOSIS — M25.641 FINGER STIFFNESS, RIGHT: ICD-10-CM

## 2025-04-17 DIAGNOSIS — G56.01 CARPAL TUNNEL SYNDROME OF RIGHT WRIST: Primary | ICD-10-CM

## 2025-04-17 PROCEDURE — 97165 OT EVAL LOW COMPLEX 30 MIN: CPT

## 2025-04-17 PROCEDURE — 97110 THERAPEUTIC EXERCISES: CPT

## 2025-04-18 NOTE — PROGRESS NOTES
OT UE EVALUATION:     Diagnosis:   Carpal tunnel syndrome of right wrist (G56.01)  Finger stiffness, right (M25.641) Patient:  Gisell Sancheza (39 year old, female)        Referring Provider: Luis Mayfield  Today's Date   4/17/2025    Precautions:  None   Date of Evaluation: 04/17/25  Next MD visit: No data recorded  Date of Injury: No data recorded  Date of Surgery: No data recorded     PATIENT SUMMARY   Summary of chief complaints: Decreased function  History of current condition: Pt had a crossbow in her hand and when she left the string it injured her hand. She had a right small finger proximal phalanx closed reduction percutaneous pinning surgery on 9/25/24. Pt was seen for occupational therapy for (R) carpal tunnel and small finger stiffness. Pt was discharged from OT due to decreased improvement in therapy. Pt opted for surgery. Pt had right carpal tunnel release and small finger manipulation on 4/9. Pt is returning to therapy post opt.   Pain level: current 5 /10, at best 2 /10, at worst 10 /10  Description of symptoms: stiffness in the pinky, numbness in D2,D3,D4, sharp, throbbing when its at rest.   Occupation: Supervisor at [employer]   Occupational Roles: worker   Leisure activities/Hobbies: crossbowing, hunting, board games, painting, crafts consisting building with wood   Prior level of function: Independent  Current limitations: writing, getting dressed, eating, open jar, open water bottle,  steering wheel  Pt goals: Return to PLOF  Hand Dominance: right  Living Situation: family    Past medical history was reviewed with Gisell.  Significant findings include: Abrasion of toe of left foot    Class 3 severe obesity due to excess calories with serious comorbidity and body mass index (BMI) of 40.0 to 44.9 in adult (HCC)    Diabetes (HCC)    Diabetes mellitus, type 2 (HCC)    Essential hypertension    High blood pressure    High cholesterol    High cholesterol    History of blood transfusion      no reactions    Hypercholesterolemia with hypertriglyceridemia    Intertrigo    Morbid (severe) obesity due to excess calories (HCC)    Morbid obesity with BMI of 45.0-49.9, adult (HCC)    Morbid obesity with BMI of 50.0-59.9, adult (HCC)    S/P gastric bypass    Uncontrolled type 2 diabetes mellitus with hyperglycemia (HCC)    Visual impairment     GLASSES  Sx of (R) hand  Imaging/Tests:     Gisell  has a past medical history of Abrasion of toe of left foot (10/30/2020), Class 3 severe obesity due to excess calories with serious comorbidity and body mass index (BMI) of 40.0 to 44.9 in adult ([facility]) (09/14/2020), Diabetes ([facility]), Diabetes mellitus, type 2 ([facility]) (09/14/2020), Essential hypertension, High blood pressure, High cholesterol, High cholesterol (01/14/2019), History of blood transfusion (2021), Hypercholesterolemia with hypertriglyceridemia, Intertrigo (02/03/2022), Morbid (severe) obesity due to excess calories ([facility]) (02/03/2022), Morbid obesity with BMI of 45.0-49.9, adult ([facility]) (02/13/2020), Morbid obesity with BMI of 50.0-59.9, adult ([facility]), S/P gastric bypass (09/14/2020), Uncontrolled type 2 diabetes mellitus with hyperglycemia ([facility]) (06/08/2018), and Visual impairment.  She  has a past surgical history that includes lap gastric bypass/nayan-en-y (09/14/2020) and hc surgery catheter eps dx/ablation other than 3d [id number].    ASSESSMENT  Gisell presents to occupational therapy evaluation with primary c/o Decreased function. Objective tests and measures show Decreased ROM, Decreased Strength, Increased Subjective c/o pain all of which significantly impacts pt ability to complete ADLs. Functional deficits include but are not limited to completing ADL's such as grooming, bathing, feeding, dressing, meal prepping, and  homemaking. Pt currently requires assistance to complete these tasks, limiting her independence. Applied gauze on incision and issued pt Tubigrip for edema management. Advised pt to ensure incision is  dry and monitor for signs of infection. Signs and symptoms are consistent with diagnosis of Carpal tunnel syndrome of right wrist (G56.01)  Finger stiffness, right (M25.641). Pt and OT discussed evaluation findings, pathology, POC and HEP.  Pt voiced understanding and performs HEP correctly without reported pain. Skilled Occupational Therapy is medically necessary to address the above impairments and reach functional goals.  OBJECTIVE:    Musculoskeletal  Observation: stitches present, no signs of drainage, swelling       Scar: raised   Cervical Screen: n/a  Special Tests: n/a     ROM and Strength  (* denotes performed with pain)  Wrist   ROM    R L     Flex (C7) 15 60     Ext (C6) 35 50     Ulnar Dev 30 24     Radial Dev 15 40        R Hand ROM   IF MF RF SF     MP 60 56 45 30     PIP 70 65 70 45     DIP 45 50 45 25     BERGERON 175 171 160 100      Thumb ROM   MP Flex IP Flex Opposition     Right 55 60  Kapanji 5     Left 65 75  WNL         Neurological:  Sensory: WNL       Light Touch Ten Test: WNL    ADLs/IADLs:  ADL's    Bathing: Minimal Assist     Dressing: Minimal Assist     Feeding: Minimal Assist     Grooming: Minimal Assist  IADL's     Homemaking: Minimal Assist     Food Prep: Moderate Assist     Driving: Modified Independent   Other Functional Mobility/ADL Comments: Independent      Today's Treatment and Response:   Pt education was provided on exam findings, treatment diagnosis, treatment plan, expectations, and prognosis.  Today's Treatment       4/17/2025   OT Treatment   Therapeutic Exercise Tendon Glides  Wrist AROM   Therapeutic Exercise Min 15   Eval Min 30   Total Timed Procedures 15   Total Service Procedures 45   Total Time 45         Patient was instructed in and issued a HEP for:       Charges:  OT EVAL Low Complexity, 1TE, 1 OT Eval   Based on analysis of data from a problem-focused assessment from a brief chart review, clinical presentation of physical, cognitive and psychosocial skills, as well  as review of patient rated outcome measures, this evaluation involved Low complexity decision making, with 1-3 occupational performance component deficits, no comorbidities, and no need for modification of tasks or assistance with assessment.                                                                                    PLAN OF CARE:    Goals: (to be met in 12 visits)   Increase AROM (R) wrist flexion by 5-10 degrees to allow pt to perform housework.  Increase AROM (R) wrist extension by 5-10 degrees to allow pt to carry items in hand.   Increase AROM (R) wrist UD by 5-10 degrees to allow pt to  open jars.     Increase AROM (R) wrist RD by 5-10 degrees to allow pt to wash hair.   Increase BERGERON of (L) IF by 10-20 degrees to allow pt to hold utensils.   Increase BERGERON of (L) MF by 10-20 degrees to allow pt to don/doff bra.  Increase BERGERON of (L) RF by 10-20 degrees to allow pt to perform laundry.    Increase BERGERON of (L) SF by 10-20 degrees to allow pt to wash dishes.   Pt will verbalize 2/3 proper body mechanics with 100% accuracy to be able to perform functional activities at home.     Frequency / Duration: Patient will be seen 2x week/6 weeksx/week or a total of 12 visits over a 90 day period. Treatment will include: Manual Therapy; Neuromuscular Re-education; Self-Care Home Management; Therapeutic Activities; Therapeutic Exercise; Home Exercise Program instruction; Ultrasound    Education or treatment limitation: None   Rehab Potential: good     QuickDASH Outcome Score  Score: (Proxy-Rptd) 56.82 % (4/17/2025  6:00 PM)      Patient/Family/Caregiver was advised of these findings, precautions, and treatment options and has agreed to actively participate in planning and for this course of care.    Thank you for your referral. Please co-sign or sign and return this letter via fax as soon as possible to [phone]. If you have any questions, please contact me at Dept: [phone]    Sincerely,  Electronically signed by  therapist: Nieves Mccann, JENNIFER/L  Physician's certification required: Yes  I certify the need for these services furnished under this plan of treatment and while under my care.    X___________________________________________________ Date____________________    Certification From: 4/17/2025  To:7/16/2025

## 2025-04-21 ENCOUNTER — APPOINTMENT (OUTPATIENT)
Dept: OCCUPATIONAL MEDICINE | Facility: HOSPITAL | Age: 40
End: 2025-04-21
Attending: STUDENT IN AN ORGANIZED HEALTH CARE EDUCATION/TRAINING PROGRAM
Payer: COMMERCIAL

## 2025-04-21 ENCOUNTER — APPOINTMENT (OUTPATIENT)
Dept: PHYSICAL THERAPY | Age: 40
End: 2025-04-21
Attending: STUDENT IN AN ORGANIZED HEALTH CARE EDUCATION/TRAINING PROGRAM
Payer: COMMERCIAL

## 2025-04-22 ENCOUNTER — APPOINTMENT (OUTPATIENT)
Dept: OCCUPATIONAL MEDICINE | Facility: HOSPITAL | Age: 40
End: 2025-04-22
Attending: STUDENT IN AN ORGANIZED HEALTH CARE EDUCATION/TRAINING PROGRAM
Payer: COMMERCIAL

## 2025-04-22 ENCOUNTER — OFFICE VISIT (OUTPATIENT)
Dept: PHYSICAL THERAPY | Age: 40
End: 2025-04-22
Attending: STUDENT IN AN ORGANIZED HEALTH CARE EDUCATION/TRAINING PROGRAM
Payer: COMMERCIAL

## 2025-04-22 PROCEDURE — 97140 MANUAL THERAPY 1/> REGIONS: CPT

## 2025-04-22 PROCEDURE — 97110 THERAPEUTIC EXERCISES: CPT

## 2025-04-22 NOTE — PROGRESS NOTES
Patient: Gisell Zepeda (39 year old, female) Referring Provider:  Insurance:   Diagnosis: Carpal tunnel syndrome of right wrist (G56.01)  Finger stiffness, right (M25.641) Luis Mayfield  [facility]   Date of Surgery: No data recorded Next MD visit:  N/A   Precautions:  None No data recorded Referral Information:   Date of Injury: No data recorded Date of Evaluation: Req: 12, Auth: 12, Exp: 7/1/2025 04/17/25 POC Auth Visits:          Today's Date   4/22/2025    Subjective  Pt states she cant really        Pain: 4/10     Objective  See tx details       Assessment  Review HEP with pt. Pt performing as instructed. Sutures present and intact at proximal portion of the transverse carpal ligament. No drainage or signs of infection noted. Pt had tightness in the thenar and abductor digiti minimi most likely due soft tissue restriction.  Pt performed all excercises with no c/o pain. Guarding observed during session as a protective response to contact near sutures. Advised pt to continue HEP to encorage moving tendons in the digits and hands.    Goals (to be met in 12 visits)   Increase AROM (R) wrist flexion by 5-10 degrees to allow pt to perform housework.  Increase AROM (R) wrist extension by 5-10 degrees to allow pt to carry items in hand.   Increase AROM (R) wrist UD by 5-10 degrees to allow pt to  open jars.     Increase AROM (R) wrist RD by 5-10 degrees to allow pt to wash hair.   Increase BERGERON of (R) IF by 10-20 degrees to allow pt to hold utensils.   Increase BERGERON of (R) MF by 10-20 degrees to allow pt to don/doff bra.  Increase BERGERON of (R) RF by 10-20 degrees to allow pt to perform laundry.    Increase BERGERON of (R) SF by 10-20 degrees to allow pt to wash dishes.   Pt will verbalize 2/3 proper body mechanics with 100% accuracy to be able to perform functional activities at home.     Plan  2x week/6 weeksx/week or a total of 12 visits over a 90 day period.    Treatment Last 4 Visits        4/17/2025 4/22/2025   OT  Treatment   Treatment Day  2   Therapeutic Exercise Tendon Glides  Wrist AROM Sponge grasp  Bilateral ball movement (wrist flex/ext, RD/UD, pro/sup)       Modalities  MHP x 5\"   Therapeutic Exercise Min 15 30   Manual Therapy Min  10   Eval Min 30    Total Timed Procedures 15 40   Total Service Procedures 45 45   Total Time 45 45         Charges     1MT, 2TE    Nieves Richmond, DARIO Mccann, OTR/L

## 2025-04-23 ENCOUNTER — APPOINTMENT (OUTPATIENT)
Dept: PHYSICAL THERAPY | Age: 40
End: 2025-04-23
Attending: STUDENT IN AN ORGANIZED HEALTH CARE EDUCATION/TRAINING PROGRAM
Payer: COMMERCIAL

## 2025-04-23 ENCOUNTER — APPOINTMENT (OUTPATIENT)
Dept: OCCUPATIONAL MEDICINE | Facility: HOSPITAL | Age: 40
End: 2025-04-23
Attending: STUDENT IN AN ORGANIZED HEALTH CARE EDUCATION/TRAINING PROGRAM
Payer: COMMERCIAL

## 2025-04-24 ENCOUNTER — APPOINTMENT (OUTPATIENT)
Dept: PHYSICAL THERAPY | Age: 40
End: 2025-04-24
Attending: STUDENT IN AN ORGANIZED HEALTH CARE EDUCATION/TRAINING PROGRAM
Payer: COMMERCIAL

## 2025-04-24 ENCOUNTER — APPOINTMENT (OUTPATIENT)
Dept: OCCUPATIONAL MEDICINE | Facility: HOSPITAL | Age: 40
End: 2025-04-24
Attending: STUDENT IN AN ORGANIZED HEALTH CARE EDUCATION/TRAINING PROGRAM
Payer: COMMERCIAL

## 2025-04-25 ENCOUNTER — APPOINTMENT (OUTPATIENT)
Dept: PHYSICAL THERAPY | Age: 40
End: 2025-04-25
Attending: STUDENT IN AN ORGANIZED HEALTH CARE EDUCATION/TRAINING PROGRAM
Payer: COMMERCIAL

## 2025-04-28 ENCOUNTER — APPOINTMENT (OUTPATIENT)
Dept: OCCUPATIONAL MEDICINE | Facility: HOSPITAL | Age: 40
End: 2025-04-28
Attending: STUDENT IN AN ORGANIZED HEALTH CARE EDUCATION/TRAINING PROGRAM
Payer: COMMERCIAL

## 2025-04-28 ENCOUNTER — APPOINTMENT (OUTPATIENT)
Dept: PHYSICAL THERAPY | Age: 40
End: 2025-04-28
Attending: STUDENT IN AN ORGANIZED HEALTH CARE EDUCATION/TRAINING PROGRAM
Payer: COMMERCIAL

## 2025-04-29 ENCOUNTER — OFFICE VISIT (OUTPATIENT)
Dept: PHYSICAL THERAPY | Age: 40
End: 2025-04-29
Attending: STUDENT IN AN ORGANIZED HEALTH CARE EDUCATION/TRAINING PROGRAM
Payer: COMMERCIAL

## 2025-04-29 ENCOUNTER — APPOINTMENT (OUTPATIENT)
Dept: OCCUPATIONAL MEDICINE | Facility: HOSPITAL | Age: 40
End: 2025-04-29
Attending: STUDENT IN AN ORGANIZED HEALTH CARE EDUCATION/TRAINING PROGRAM
Payer: COMMERCIAL

## 2025-04-29 PROCEDURE — 97140 MANUAL THERAPY 1/> REGIONS: CPT

## 2025-04-29 PROCEDURE — 97110 THERAPEUTIC EXERCISES: CPT

## 2025-04-29 NOTE — PROGRESS NOTES
Patient: Gisell Zepeda (39 year old, female) Referring Provider:  Insurance:   Diagnosis: Carpal tunnel syndrome of right wrist (G56.01)  Finger stiffness, right (M25.641) Luis Mayfield  [facility]   Date of Surgery: No data recorded Next MD visit:  N/A   Precautions:  None No data recorded Referral Information:   Date of Injury: No data recorded Date of Evaluation: Req: 12, Auth: 12, Exp: 7/1/2025 04/17/25 POC Auth Visits:          Today's Date   4/29/2025    Subjective  Pt states       Pain: 2/10     Objective  See tx details       Assessment  Incision site appears clean, with no signs of infection. Removed mild peeling of the skin surrounding the area with surgical scissors. Recommended pt to apply vitamin E oil. Issued scar management instructions to promote healing. Pt had some mild tightness on the ECRB and thenar eminance. Symptoms due to swelling and shortened musculature. Pt tends to engage in guarding behavior which can also contribute to increased tightness and swelling. Discussed imprtance of monitoring body mechanics to mininse guarding.   Recommended pt to apply heat instead of ice to reduce stiffness. Advised pt to continue with HEP. Issed pt static serial cast to increase extension of the small finger. Pt is to wear static serial cast at night. Pt voiced good understanding.     Goals (to be met in 12 visits)   Increase AROM (R) wrist flexion by 5-10 degrees to allow pt to perform housework.  Increase AROM (R) wrist extension by 5-10 degrees to allow pt to carry items in hand.   Increase AROM (R) wrist UD by 5-10 degrees to allow pt to  open jars.     Increase AROM (R) wrist RD by 5-10 degrees to allow pt to wash hair.   Increase BERGERON of (R) IF by 10-20 degrees to allow pt to hold utensils.   Increase BERGERON of (R) MF by 10-20 degrees to allow pt to don/doff bra.  Increase BERGERON of (R) RF by 10-20 degrees to allow pt to perform laundry.    Increase BERGERON of (R) SF by 10-20 degrees to allow pt to wash  dishes.   Pt will verbalize 2/3 proper body mechanics with 100% accuracy to be able to perform functional activities at home.         Plan  2x week/6 weeksx/week or a total of 12 visits over a 90 day period.    Treatment Last 4 Visits        4/17/2025 4/22/2025 4/29/2025   OT Treatment   Treatment Day  2 3   Therapeutic Exercise Tendon Glides  Wrist AROM Sponge grasp  Bilateral ball movement (wrist flex/ext, RD/UD, pro/sup)     Powerweb stretchx 3 min   Sponge grasp  Bilateral ball movement (wrist flex/ext, RD/UD, pro/sup) 2 min each     Manual Therapy   STM  Edema massage  Scar massage   Modalities  MHP x 5\"    Therapeutic Exercise Min 15 30 35   Manual Therapy Min  10 10   Eval Min 30     Total Timed Procedures 15 40 45   Total Service Procedures 45 45 45   Total Time 45 45 45         HEP  Scar Management  Static serial cast for small finger    Charges     1MT, 2TE    DARIO Dotson, OTR/L

## 2025-05-01 ENCOUNTER — OFFICE VISIT (OUTPATIENT)
Dept: PHYSICAL THERAPY | Age: 40
End: 2025-05-01
Attending: STUDENT IN AN ORGANIZED HEALTH CARE EDUCATION/TRAINING PROGRAM
Payer: COMMERCIAL

## 2025-05-01 ENCOUNTER — APPOINTMENT (OUTPATIENT)
Dept: OCCUPATIONAL MEDICINE | Facility: HOSPITAL | Age: 40
End: 2025-05-01
Attending: STUDENT IN AN ORGANIZED HEALTH CARE EDUCATION/TRAINING PROGRAM
Payer: COMMERCIAL

## 2025-05-01 PROCEDURE — 97110 THERAPEUTIC EXERCISES: CPT

## 2025-05-01 PROCEDURE — 97140 MANUAL THERAPY 1/> REGIONS: CPT

## 2025-05-01 NOTE — PROGRESS NOTES
Patient: Gisell Zepeda (39 year old, female) Referring Provider:  Insurance:   Diagnosis: Carpal tunnel syndrome of right wrist (G56.01)  Finger stiffness, right (M25.641) Luis Umanzoryt  [facility]   Date of Surgery: No data recorded Next MD visit:  N/A   Precautions:  None No data recorded Referral Information:   Date of Injury: No data recorded Date of Evaluation: Req: 12, Auth: 12, Exp: 7/1/2025 04/17/25 POC Auth Visits:  12       Today's Date   5/1/2025    Subjective  Pt states shes been doing the scar massage, however, she feels build up of fluid.       Pain: 2/10     Objective  See tx details      Assessment  Mild peeling displayed at the surrounding area of transverse carpal ligament. Pt had some mild swelling in the hands. Issued pt tubigrip size D to manage swelling. Pt had some mild stiffness in in the digiti minimi muscle and small finger. 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 Pt has been holding mouse where weight is on ulnar side contributing to prolong compression. Advised pt to take breaks during work tasks. Pt has also been applying heat to help with stiffness. Recommended a mouse pad with wrist rest and adjust hand ergonomics to neutral position to offload pressure on one side of hand. Pt been wearing static serial cast, but had to remove it at night due to discomfort. Pt will bring splint next visit to be adjusted.    Goals (to be met in 12 visits)   Increase AROM (R) wrist flexion by 5-10 degrees to allow pt to perform housework.  Increase AROM (R) wrist extension by 5-10 degrees to allow pt to carry items in hand.   Increase AROM (R) wrist UD by 5-10 degrees to allow pt to  open jars.     Increase AROM (R) wrist RD by 5-10 degrees to allow pt to wash hair.   Increase BERGERON of (R) IF by 10-20 degrees to allow pt to hold utensils.   Increase BERGERON of (R) MF by 10-20 degrees to allow pt to don/doff bra.  Increase BERGERON of (R) RF by 10-20 degrees to allow pt to perform  laundry.    Increase BERGERON of (R) SF by 10-20 degrees to allow pt to wash dishes.   Pt will verbalize 2/3 proper body mechanics with 100% accuracy to be able to perform functional activities at home.             Plan  2x week/6 weeksx/week or a total of 12 visits over a 90 day period. Assess for size of mouse, review types of mice and hand placement.    Treatment Last 4 Visits        4/17/2025 4/22/2025 4/29/2025 5/1/2025   OT Treatment   Treatment Day  2 3 4   Therapeutic Exercise Tendon Glides  Wrist AROM Sponge grasp  Bilateral ball movement (wrist flex/ext, RD/UD, pro/sup)     Powerweb stretchx 3 min   Sponge grasp  Bilateral ball movement (wrist flex/ext, RD/UD, pro/sup) 2 min each   Powerweb stretchx 3 min   Bilateral ball movement (wrist flex/ext, RD/UD, pro/sup) 2 min each  Sponge abd/add and grasp       Manual Therapy   STM  Edema massage  Scar massage STM  Edema massage  Scar massage     Modalities  MHP x 5\"     Therapeutic Exercise Min 15 30 35 35   Manual Therapy Min  10 10 10   Eval Min 30      Total Timed Procedures 15 40 45 45   Total Service Procedures 45 45 45 45   Total Time 45 45 45 45         Charges     1MT, 2TE    Jessica Mccann OTR/DARIO Crow

## 2025-05-05 ENCOUNTER — APPOINTMENT (OUTPATIENT)
Dept: OCCUPATIONAL MEDICINE | Facility: HOSPITAL | Age: 40
End: 2025-05-05
Attending: STUDENT IN AN ORGANIZED HEALTH CARE EDUCATION/TRAINING PROGRAM
Payer: COMMERCIAL

## 2025-05-05 ENCOUNTER — APPOINTMENT (OUTPATIENT)
Dept: PHYSICAL THERAPY | Age: 40
End: 2025-05-05
Attending: STUDENT IN AN ORGANIZED HEALTH CARE EDUCATION/TRAINING PROGRAM
Payer: COMMERCIAL

## 2025-05-06 ENCOUNTER — OFFICE VISIT (OUTPATIENT)
Dept: PHYSICAL THERAPY | Age: 40
End: 2025-05-06
Attending: STUDENT IN AN ORGANIZED HEALTH CARE EDUCATION/TRAINING PROGRAM
Payer: COMMERCIAL

## 2025-05-06 PROCEDURE — 97140 MANUAL THERAPY 1/> REGIONS: CPT

## 2025-05-06 PROCEDURE — 97110 THERAPEUTIC EXERCISES: CPT

## 2025-05-06 NOTE — PROGRESS NOTES
Patient: Gisell Zepeda (39 year old, female) Referring Provider:  Insurance:   Diagnosis: Carpal tunnel syndrome of right wrist (G56.01)  Finger stiffness, right (M25.641) Luis Mayfield  [facility]   Date of Surgery: No data recorded Next MD visit:  N/A   Precautions:  None No data recorded Referral Information:   Date of Injury: No data recorded Date of Evaluation: Req: 12, Auth: 12, Exp: 7/1/2025 04/17/25 POC Auth Visits:  12       Today's Date   5/6/2025    Subjective  Pt states she can flex her all digits but the small finger       Pain: 2/10     Objective  See tx details      Assessment   Surgical site at CTR appear to be possible cyst from embedded sutures. Noted yellow spot which may be indicating possible infection. Advised pt to seek MD consult. Pt also presented with mild swelling, redness and sensitivity at proximal end of scar. Advised pt to wear compression gloves at night to reduce swelling. Pt returned serial cast as she is not able to wear it due to discomfort.  New splint to be fabricated at next visit  to address (R)SFextensor lag. Pt had difficulty with chickpea transfer due to muscular tightness at SF. Advised pt to perform PROM. Pt agreed.    Goals (to be met in 12 visits)   Increase AROM (R) wrist flexion by 5-10 degrees to allow pt to perform housework.  Increase AROM (R) wrist extension by 5-10 degrees to allow pt to carry items in hand.   Increase AROM (R) wrist UD by 5-10 degrees to allow pt to  open jars.     Increase AROM (R) wrist RD by 5-10 degrees to allow pt to wash hair.   Increase BERGERON of (R) IF by 10-20 degrees to allow pt to hold utensils.   Increase BERGERON of (R) MF by 10-20 degrees to allow pt to don/doff bra.  Increase BERGERON of (R) RF by 10-20 degrees to allow pt to perform laundry.    Increase BERGERON of (R) SF by 10-20 degrees to allow pt to wash dishes.   Pt will verbalize 2/3 proper body mechanics with 100% accuracy to be able to perform functional activities at  home.                 Plan  2x week/6 weeksx/week or a total of 12 visits over a 90 day period.    Treatment Last 4 Visits        4/22/2025 4/29/2025 5/1/2025 5/6/2025   OT Treatment   Treatment Day 2 3 4 5   Therapeutic Exercise Sponge grasp  Bilateral ball movement (wrist flex/ext, RD/UD, pro/sup)     Powerweb stretchx 3 min   Sponge grasp  Bilateral ball movement (wrist flex/ext, RD/UD, pro/sup) 2 min each   Powerweb stretchx 3 min   Bilateral ball movement (wrist flex/ext, RD/UD, pro/sup) 2 min each  Sponge abd/add and grasp     Powerweb stretches x 3 min  Sponge grasp w/lumbrical  Chickpeas transfer   Manual Therapy  STM  Edema massage  Scar massage STM  Edema massage  Scar massage   STM  [place]ama massage  Scar massage   Modalities MHP x 5\"      Therapeutic Exercise Min 30 35 35 35   Manual Therapy Min 10 10 10 10   Total Timed Procedures 40 45 45 45   Total Service Procedures 45 45 45 45   Total Time 45 45 45 45         Charges     1MT,2TE    Nieves Richmond, SOBALJEET Mccann, OTR/L

## 2025-05-07 ENCOUNTER — APPOINTMENT (OUTPATIENT)
Dept: PHYSICAL THERAPY | Age: 40
End: 2025-05-07
Attending: STUDENT IN AN ORGANIZED HEALTH CARE EDUCATION/TRAINING PROGRAM
Payer: COMMERCIAL

## 2025-05-07 ENCOUNTER — APPOINTMENT (OUTPATIENT)
Dept: OCCUPATIONAL MEDICINE | Facility: HOSPITAL | Age: 40
End: 2025-05-07
Attending: STUDENT IN AN ORGANIZED HEALTH CARE EDUCATION/TRAINING PROGRAM
Payer: COMMERCIAL

## 2025-05-08 ENCOUNTER — OFFICE VISIT (OUTPATIENT)
Dept: PHYSICAL THERAPY | Age: 40
End: 2025-05-08
Attending: STUDENT IN AN ORGANIZED HEALTH CARE EDUCATION/TRAINING PROGRAM
Payer: COMMERCIAL

## 2025-05-08 PROCEDURE — 97110 THERAPEUTIC EXERCISES: CPT

## 2025-05-08 PROCEDURE — 97140 MANUAL THERAPY 1/> REGIONS: CPT

## 2025-05-08 NOTE — PROGRESS NOTES
Patient: Gisell Zepeda (39 year old, female) Referring Provider:  Insurance:   Diagnosis: Carpal tunnel syndrome of right wrist (G56.01)  Finger stiffness, right (M25.641) Luis Chaparro  [facility]   Date of Surgery: No data recorded Next MD visit:  N/A   Precautions:  None No data recorded Referral Information:   Date of Injury: No data recorded Date of Evaluation: Req: 12, Auth: 12, Exp: 7/1/2025 04/17/25 POC Auth Visits:  12       Today's Date   5/8/2025    Subjective  Pt states hand is sore       Pain: 3/10     Objective  See tx details      Assessment  Defer strengthening due to open scab. 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 Pt has been applying heat. Issued pt static serial cast to increase extension at SF. Pt is to wear static serial cast at night. Pt voiced good understanding.    Goals (to be met in 12 visits)   Increase AROM (R) wrist flexion by 5-10 degrees to allow pt to perform housework.  Increase AROM (R) wrist extension by 5-10 degrees to allow pt to carry items in hand.   Increase AROM (R) wrist UD by 5-10 degrees to allow pt to  open jars.     Increase AROM (R) wrist RD by 5-10 degrees to allow pt to wash hair.   Increase BERGERON of (R) IF by 10-20 degrees to allow pt to hold utensils.   Increase BERGERON of (R) MF by 10-20 degrees to allow pt to don/doff bra.  Increase BERGERON of (R) RF by 10-20 degrees to allow pt to perform laundry.    Increase BERGERON of (R) SF by 10-20 degrees to allow pt to wash dishes.   Pt will verbalize 2/3 proper body mechanics with 100% accuracy to be able to perform functional activities at home.                   Plan  2x week/6 weeksx/week or a total of 12 visits over a 90 day period.    Treatment Last 4 Visits        4/29/2025 5/1/2025 5/6/2025 5/8/2025   OT Treatment   Treatment Day 3 4 5 6   Therapeutic Exercise Powerweb stretchx 3 min   Sponge grasp  Bilateral ball movement (wrist flex/ext, RD/UD, pro/sup) 2 min each    Powerweb stretchx 3 min   Bilateral ball movement (wrist flex/ext, RD/UD, pro/sup) 2 min each  Sponge abd/add and grasp     Powerweb stretches x 3 min  Sponge grasp w/lumbrical  Chickpeas transfer Wrist excerciser x 3 min  SB walk up x 3 min  Static serial cast   Manual Therapy STM  Edema massage  Scar massage STM  Edema massage  Scar massage   STM  [place]ama massage  Scar massage STM   Therapeutic Exercise Min 35 35 35 35   Manual Therapy Min 10 10 10 10   Total Timed Procedures 45 45 45 45   Total Service Procedures 45 45 45 45   Total Time 45 45 45 45         HEP   Static Serial Cast     Charges     1MT, 2TE    Nieves Richmond, SOBALJEET Mccann , OTR/L

## 2025-05-13 ENCOUNTER — APPOINTMENT (OUTPATIENT)
Dept: OCCUPATIONAL MEDICINE | Facility: HOSPITAL | Age: 40
End: 2025-05-13
Attending: STUDENT IN AN ORGANIZED HEALTH CARE EDUCATION/TRAINING PROGRAM
Payer: COMMERCIAL

## 2025-05-13 ENCOUNTER — OFFICE VISIT (OUTPATIENT)
Dept: PHYSICAL THERAPY | Age: 40
End: 2025-05-13
Attending: STUDENT IN AN ORGANIZED HEALTH CARE EDUCATION/TRAINING PROGRAM
Payer: COMMERCIAL

## 2025-05-13 PROCEDURE — 97110 THERAPEUTIC EXERCISES: CPT

## 2025-05-13 PROCEDURE — 97140 MANUAL THERAPY 1/> REGIONS: CPT

## 2025-05-13 NOTE — PROGRESS NOTES
Patient: Gisell Zepeda (39 year old, female) Referring Provider:  Insurance:   Diagnosis: Carpal tunnel syndrome of right wrist (G56.01)  Finger stiffness, right (M25.641) Luis Chaparro  [facility]   Date of Surgery: No data recorded Next MD visit:  N/A   Precautions:  None No data recorded Referral Information:   Date of Injury: No data recorded Date of Evaluation: Req: 12, Auth: 12, Exp: 7/1/2025 04/17/25 POC Auth Visits:  12       Today's Date   5/13/2025    Subjective  Pt states scar tissue is not as hard but it feels tender in the surrounding area of incision       Pain: 2/10     Objective  See tx details      Assessment  Mild edema at surgical site at CTR. Issued pt contrast bath for swelling. Pt reported drainage, noting the color clear and no odor. Noted tenderness at CTR due to fluid buildup. Performed retrograde massage. Improved flexion at SF when performing exercises during session. Pt has been wearing static serial cast at night. Pt continues to have extensor lag at SF.  Recommend pt to continue to wear cast for a total of 6 weeks at night. Will reassess next visit and modify.      Goals (to be met in 12 visits)   Increase AROM (R) wrist flexion by 5-10 degrees to allow pt to perform housework.  Increase AROM (R) wrist extension by 5-10 degrees to allow pt to carry items in hand.   Increase AROM (R) wrist UD by 5-10 degrees to allow pt to  open jars.     Increase AROM (R) wrist RD by 5-10 degrees to allow pt to wash hair.   Increase BERGERON of (R) IF by 10-20 degrees to allow pt to hold utensils.   Increase BERGERON of (R) MF by 10-20 degrees to allow pt to don/doff bra.  Increase BERGERON of (R) RF by 10-20 degrees to allow pt to perform laundry.    Increase BERGERON of (R) SF by 10-20 degrees to allow pt to wash dishes.   Pt will verbalize 2/3 proper body mechanics with 100% accuracy to be able to perform functional activities at home.                       Plan  2x week/6 weeksx/week or a total of 12 visits over a  90 day period.    Treatment Last 4 Visits        5/1/2025 5/6/2025 5/8/2025 5/13/2025   OT Treatment   Treatment Day 4 5 6 7   Therapeutic Exercise Powerweb stretchx 3 min   Bilateral ball movement (wrist flex/ext, RD/UD, pro/sup) 2 min each  Sponge abd/add and grasp     Powerweb stretches x 3 min  Sponge grasp w/lumbrical  [place] transfer Wrist excerciser x 3 min  SB walk up x 3 min  Static serial cast Velcro board  Hook fist w/ wooden pegs  Cane winding w/ 2.5Ibs ankle weights     Manual Therapy STM  Edema massage  Scar massage   STM  Edama massage  Scar massage STM STM  Edema Massage   Therapeutic Exercise Min 35 35 35 35   Manual Therapy Min 10 10 10 10   Total Timed Procedures 45 45 45 45   Total Service Procedures 45 45 45 45   Total Time 45 45 45 45       HEP  Contrast bath    Charges     1MT, 2TE    Nieves Richmond, SOBALJEET Mccann, OTR/L

## 2025-05-15 ENCOUNTER — APPOINTMENT (OUTPATIENT)
Dept: OCCUPATIONAL MEDICINE | Facility: HOSPITAL | Age: 40
End: 2025-05-15
Attending: STUDENT IN AN ORGANIZED HEALTH CARE EDUCATION/TRAINING PROGRAM
Payer: COMMERCIAL

## 2025-05-15 ENCOUNTER — OFFICE VISIT (OUTPATIENT)
Dept: PHYSICAL THERAPY | Age: 40
End: 2025-05-15
Attending: STUDENT IN AN ORGANIZED HEALTH CARE EDUCATION/TRAINING PROGRAM
Payer: COMMERCIAL

## 2025-05-15 PROCEDURE — 97110 THERAPEUTIC EXERCISES: CPT

## 2025-05-15 PROCEDURE — 97140 MANUAL THERAPY 1/> REGIONS: CPT

## 2025-05-15 NOTE — PROGRESS NOTES
Patient: Gisell Zepeda (39 year old, female) Referring Provider:  Insurance:   Diagnosis: Carpal tunnel syndrome of right wrist (G56.01)  Finger stiffness, right (M25.641) Luis Chaparro  [facility]   Date of Surgery: No data recorded Next MD visit:  N/A   Precautions:  None No data recorded Referral Information:   Date of Injury: No data recorded Date of Evaluation: Req: 12, Auth: 12, Exp: 7/1/2025 04/17/25 POC Auth Visits:  12       Today's Date   5/15/2025    Subjective  Pt states her hand feels tight.       Pain: 2/10     Objective  See tx details      Assessment  Pt had some mild tightness d2-d4 digits. Pt mentioned she was outside for 2 hours raking the yard. Symptoms most likely due to overuse. Pt has been applying heat. Noted decreased swelling at CTR. Pt reported she has been performing contrast bath, which assisted with edema management. Scab at CTR appeared closed, decreased tenderness, and no signs of drainage. Initiated strengthening during session and pt responded well. Improved extension at (R)SF. Instructed pt to bring static serial cast to next visit to modify. Pt agreed.    Goals (to be met in 12 visits)   Increase AROM (R) wrist flexion by 5-10 degrees to allow pt to perform housework.  Increase AROM (R) wrist extension by 5-10 degrees to allow pt to carry items in hand.   Increase AROM (R) wrist UD by 5-10 degrees to allow pt to  open jars.     Increase AROM (R) wrist RD by 5-10 degrees to allow pt to wash hair.   Increase BERGERON of (R) IF by 10-20 degrees to allow pt to hold utensils.   Increase BERGERON of (R) MF by 10-20 degrees to allow pt to don/doff bra.  Increase BERGERON of (R) RF by 10-20 degrees to allow pt to perform laundry.    Increase BERGERON of (R) SF by 10-20 degrees to allow pt to wash dishes.   Pt will verbalize 2/3 proper body mechanics with 100% accuracy to be able to perform functional activities at home.                         Plan  2x week/6 weeksx/week or a total of 12 visits over a 90  day period.    Treatment Last 4 Visits        5/6/2025 5/8/2025 5/13/2025 5/15/2025   OT Treatment   Treatment Day 5 6 7 8   Therapeutic Exercise Powerweb stretches x 3 min  Sponge grasp w/lumbrical  [place] transfer Wrist excerciser x 3 min  SB walk up x 3 min  Static serial cast Velcro board  Hook fist w/ wooden pegs  Cane winding w/ 2.5Ibs ankle weights   Wooden Velcro x 2 min  Powerweb stretches x 2 min  Cane winding w/ 2.5Ibs x 2 min  Bicep curls w/ 2Ibs x 2min  Tricep curls w/ 2Ibs x 2 min  Handhelper w/ 2Y RB x 2 min   Manual Therapy STM  Edama massage  Scar massage STM STM  Edema Massage STM   Modalities    MHP x 5\"   Therapeutic Exercise Min 35 35 35 35   Manual Therapy Min 10 10 10 10   Total Timed Procedures 45 45 45 45   Total Service Procedures 45 45 45 45   Total Time 45 45 45 45         Charges     1MT, 2TE    Jessica Mccann OTR/L  DARIO Dotson

## 2025-05-16 ENCOUNTER — APPOINTMENT (OUTPATIENT)
Dept: PHYSICAL THERAPY | Age: 40
End: 2025-05-16
Attending: STUDENT IN AN ORGANIZED HEALTH CARE EDUCATION/TRAINING PROGRAM
Payer: COMMERCIAL

## 2025-05-16 ENCOUNTER — OFFICE VISIT (OUTPATIENT)
Dept: INTERNAL MEDICINE CLINIC | Facility: CLINIC | Age: 40
End: 2025-05-16
Payer: COMMERCIAL

## 2025-05-16 VITALS
OXYGEN SATURATION: 100 % | BODY MASS INDEX: 28.76 KG/M2 | SYSTOLIC BLOOD PRESSURE: 132 MMHG | HEIGHT: 58 IN | DIASTOLIC BLOOD PRESSURE: 69 MMHG | HEART RATE: 100 BPM | WEIGHT: 137 LBS | TEMPERATURE: 98 F

## 2025-05-16 DIAGNOSIS — S62.616A CLOSED DISPLACED FRACTURE OF PROXIMAL PHALANX OF RIGHT LITTLE FINGER, INITIAL ENCOUNTER: ICD-10-CM

## 2025-05-16 DIAGNOSIS — I10 ESSENTIAL HYPERTENSION: ICD-10-CM

## 2025-05-16 DIAGNOSIS — N94.10 DYSPAREUNIA, FEMALE: ICD-10-CM

## 2025-05-16 DIAGNOSIS — Z71.85 VACCINE COUNSELING: ICD-10-CM

## 2025-05-16 DIAGNOSIS — N76.1 SUBACUTE VAGINITIS: ICD-10-CM

## 2025-05-16 DIAGNOSIS — Z12.31 ENCOUNTER FOR SCREENING MAMMOGRAM FOR BREAST CANCER: ICD-10-CM

## 2025-05-16 DIAGNOSIS — F41.9 ANXIETY: ICD-10-CM

## 2025-05-16 DIAGNOSIS — E78.2 HYPERCHOLESTEROLEMIA WITH HYPERTRIGLYCERIDEMIA: ICD-10-CM

## 2025-05-16 DIAGNOSIS — D64.9 ANEMIA, UNSPECIFIED TYPE: ICD-10-CM

## 2025-05-16 DIAGNOSIS — E11.649 TYPE 2 DIABETES MELLITUS WITH HYPOGLYCEMIA WITHOUT COMA, WITHOUT LONG-TERM CURRENT USE OF INSULIN (HCC): Primary | ICD-10-CM

## 2025-05-16 DIAGNOSIS — E55.9 VITAMIN D DEFICIENCY: ICD-10-CM

## 2025-05-16 LAB
ALBUMIN SERPL-MCNC: 4.8 G/DL (ref 3.2–4.8)
ALBUMIN/GLOB SERPL: 1.8 {RATIO} (ref 1–2)
ALP LIVER SERPL-CCNC: 55 U/L (ref 37–98)
ALT SERPL-CCNC: 12 U/L (ref 10–49)
ANION GAP SERPL CALC-SCNC: 7 MMOL/L (ref 0–18)
APPEARANCE: CLEAR
AST SERPL-CCNC: 20 U/L (ref ?–34)
BILIRUB SERPL-MCNC: 0.2 MG/DL (ref 0.3–1.2)
BUN BLD-MCNC: 12 MG/DL (ref 9–23)
BUN/CREAT SERPL: 16.9 (ref 10–20)
CALCIUM BLD-MCNC: 8.9 MG/DL (ref 8.7–10.4)
CHLORIDE SERPL-SCNC: 105 MMOL/L (ref 98–112)
CHOLEST SERPL-MCNC: 184 MG/DL (ref ?–200)
CO2 SERPL-SCNC: 26 MMOL/L (ref 21–32)
CREAT BLD-MCNC: 0.71 MG/DL (ref 0.55–1.02)
DEPRECATED HBV CORE AB SER IA-ACNC: <1 NG/ML (ref 50–306)
EGFRCR SERPLBLD CKD-EPI 2021: 110 ML/MIN/1.73M2 (ref 60–?)
EST. AVERAGE GLUCOSE BLD GHB EST-MCNC: 146 MG/DL (ref 68–126)
FASTING PATIENT LIPID ANSWER: YES
FASTING STATUS PATIENT QL REPORTED: YES
GLOBULIN PLAS-MCNC: 2.6 G/DL (ref 2–3.5)
GLUCOSE (URINE DIPSTICK): NEGATIVE MG/DL
GLUCOSE BLD-MCNC: 93 MG/DL (ref 70–99)
HBA1C MFR BLD: 6.7 % (ref ?–5.7)
HDLC SERPL-MCNC: 66 MG/DL (ref 40–59)
IRON SATN MFR SERPL: 1 % (ref 15–50)
IRON SERPL-MCNC: 6 UG/DL (ref 50–170)
LDLC SERPL CALC-MCNC: 98 MG/DL (ref ?–100)
MULTISTIX LOT#: ABNORMAL NUMERIC
NITRITE, URINE: NEGATIVE
NONHDLC SERPL-MCNC: 118 MG/DL (ref ?–130)
OCCULT BLOOD: NEGATIVE
OSMOLALITY SERPL CALC.SUM OF ELEC: 285 MOSM/KG (ref 275–295)
PH, URINE: 5.5 (ref 4.5–8)
POTASSIUM SERPL-SCNC: 3.8 MMOL/L (ref 3.5–5.1)
PROT SERPL-MCNC: 7.4 G/DL (ref 5.7–8.2)
PROTEIN (URINE DIPSTICK): NEGATIVE MG/DL
SODIUM SERPL-SCNC: 138 MMOL/L (ref 136–145)
SPECIFIC GRAVITY: 1.02 (ref 1–1.03)
TOTAL IRON BINDING CAPACITY: 474 UG/DL (ref 250–425)
TRANSFERRIN SERPL-MCNC: 395 MG/DL (ref 250–380)
TRIGL SERPL-MCNC: 111 MG/DL (ref 30–149)
URINE-COLOR: YELLOW
UROBILINOGEN,SEMI-QN: 1 MG/DL (ref 0–1.9)
VIT D+METAB SERPL-MCNC: 30.4 NG/ML (ref 30–100)
VLDLC SERPL CALC-MCNC: 18 MG/DL (ref 0–30)

## 2025-05-16 PROCEDURE — 99215 OFFICE O/P EST HI 40 MIN: CPT | Performed by: INTERNAL MEDICINE

## 2025-05-16 PROCEDURE — 3075F SYST BP GE 130 - 139MM HG: CPT | Performed by: INTERNAL MEDICINE

## 2025-05-16 PROCEDURE — 3044F HG A1C LEVEL LT 7.0%: CPT | Performed by: INTERNAL MEDICINE

## 2025-05-16 PROCEDURE — 81003 URINALYSIS AUTO W/O SCOPE: CPT | Performed by: INTERNAL MEDICINE

## 2025-05-16 PROCEDURE — 3078F DIAST BP <80 MM HG: CPT | Performed by: INTERNAL MEDICINE

## 2025-05-16 PROCEDURE — 3008F BODY MASS INDEX DOCD: CPT | Performed by: INTERNAL MEDICINE

## 2025-05-16 RX ORDER — FLUCONAZOLE 150 MG/1
150 TABLET ORAL EVERY OTHER DAY
Qty: 2 TABLET | Refills: 0 | Status: SHIPPED | OUTPATIENT
Start: 2025-05-16

## 2025-05-16 RX ORDER — HYDROCORTISONE ACETATE 25 MG/1
25 SUPPOSITORY RECTAL 2 TIMES DAILY
Qty: 30 SUPPOSITORY | Refills: 1 | Status: SHIPPED | OUTPATIENT
Start: 2025-05-16

## 2025-05-16 NOTE — PROGRESS NOTES
HPI:      Patient ID: Gisell Zepeda is a 39 year old female.    Chief Complaint   Patient presents with    Vaginal Problem     Patient is complaining of burning and itching         Patient here for follow up of Diabetes.  Has been taking medications regularly.    Checks sugars 1 times daily. Dexcom on back order. Not checking X 1. Highest 220 from 150. Fatstng 90's. Stress eatting. No lows.   Watches diabetic diet, low salt.  Diabetic Flow sheet      Latest Ref Rng & Units 5/16/2025     2:42 PM 5/16/2025     1:24 PM 5/16/2025     1:21 PM 4/9/2025     5:02 PM   DIABETIC FLOWSHEET (EE)   BMI   28.63 kg/m2     Hgb A1c <5.7 % 6.7       Cholesterol Total <200 mg/dL 184       Triglycerides 30 - 149 mg/dL 111       HDL 40 - 59 mg/dL 66       LDL <100 mg/dL 98       HEMOGLOBIN A1c <5.7 % 6.7       Weight (enc vitals)   137 lb     BP (enc vitals)   132/69  125/80   PHQ2 Score    0       HISTORY OF DIABETES COMPLICATIONS: :  History of Retinopathy: Y  History of Neuropathy: N  History of Nephropathy: N     ASSOCIATED COMPLICATIONS:   HTN: Y  Hyperlipidemia: Y  Coronary Artery Disease:  CAD,  HF, PAD  Cerebrovascular Disease: N    MEALS:   3 meals a day  Cooks at home    Wt Readings from Last 3 Encounters:   05/16/25 137 lb (62.1 kg)   04/09/25 142 lb (64.4 kg)   02/07/25 139 lb (63 kg)     BP Readings from Last 3 Encounters:   05/16/25 132/69   04/09/25 125/80   01/03/25 132/80     Labs:   Lab Results   Component Value Date/Time    GLU 93 05/16/2025 02:42 PM     05/16/2025 02:42 PM    K 3.8 05/16/2025 02:42 PM     05/16/2025 02:42 PM    CO2 26.0 05/16/2025 02:42 PM    CREATSERUM 0.71 05/16/2025 02:42 PM    CA 8.9 05/16/2025 02:42 PM    AST 20 05/16/2025 02:42 PM    ALT 12 05/16/2025 02:42 PM    TSH 0.756 09/25/2024 10:08 AM        Lab Results   Component Value Date/Time    CHOLEST 184 05/16/2025 02:42 PM    HDL 66 (H) 05/16/2025 02:42 PM    TRIG 111 05/16/2025 02:42 PM    LDL 98 05/16/2025 02:42 PM    NONHDLC 118  05/16/2025 02:42 PM       Lab Results   Component Value Date/Time    A1C 6.7 (H) 05/16/2025 02:42 PM      Lab Results   Component Value Date    VITD 30.4 05/16/2025             Latest Ref Rng & Units 5/16/2025     2:42 PM 5/16/2025     1:24 PM 5/16/2025     1:21 PM 4/9/2025     5:02 PM   DIABETIC FLOWSHEET ([facility])   BMI   28.63 kg/m2     Hgb A1c <5.7 % 6.7       Cholesterol Total <200 mg/dL 184       Triglycerides 30 - 149 mg/dL 111       HDL 40 - 59 mg/dL 66       LDL <100 mg/dL 98       HEMOGLOBIN A1c <5.7 % 6.7       Weight (enc vitals)   137 lb     BP (enc vitals)   132/69  125/80   PHQ2 Score    0        Checks feet nightly, no open sores.    -Re: potential DM medication contraindication (if positive, checkbox selected):  [] History of pancreatitis  [] Personal/fam hx of medullary thyroid cancer/MEN2 Paternal grandmother had thyroid removed, lived to be 80 years old, was secretive about their health conditions, does not know if they had thyroid cancer dx  [x] History of recent/frequent UTI/yeast infxn  [] Previous amputation related to diabetes  [] Hx of [id number] euglycemic DKA     -Presence of associated DM complications (if positive, checkbox selected):  [] Macrovascular complications (CAD/CVA/PAD)  [] Neuropathy  [] Retinopathy  [] Nephropathy  [x] HTN  [x] Hyperlipidemia  [] Stroke/TIA  [] Gastroparesis  [] Wound, ulcer or amputations     Hypertension  Patient is here for follow up of hypertension. BP at home: not check.   Has been compliant with medications.  Exercise level: not active, somewhat active, or trying to do more and has been following low salt diet.  Weight has been stable. Not eat out. Overdoing healthy foods.   Wt Readings from Last 3 Encounters:   05/16/25 137 lb (62.1 kg)   04/09/25 142 lb (64.4 kg)   02/07/25 139 lb (63 kg)     BP Readings from Last 3 Encounters:   05/16/25 132/69   04/09/25 125/80   01/03/25 132/80     Labs:   Lab Results   Component Value Date/Time    GLU 93  05/16/2025 02:42 PM     05/16/2025 02:42 PM    K 3.8 05/16/2025 02:42 PM     05/16/2025 02:42 PM    CO2 26.0 05/16/2025 02:42 PM    CREATSERUM 0.71 05/16/2025 02:42 PM    CA 8.9 05/16/2025 02:42 PM    AST 20 05/16/2025 02:42 PM    ALT 12 05/16/2025 02:42 PM    TSH 0.756 09/25/2024 10:08 AM        Lab Results   Component Value Date/Time    CHOLEST 184 05/16/2025 02:42 PM    HDL 66 (H) 05/16/2025 02:42 PM    TRIG 111 05/16/2025 02:42 PM    LDL 98 05/16/2025 02:42 PM    NONHDLC 118 05/16/2025 02:42 PM            Wt Readings from Last 3 Encounters:   05/16/25 137 lb (62.1 kg)   04/09/25 142 lb (64.4 kg)   02/07/25 139 lb (63 kg)     BP Readings from Last 3 Encounters:   05/16/25 132/69   04/09/25 125/80   01/03/25 132/80     Labs:   Lab Results   Component Value Date/Time    GLU 93 05/16/2025 02:42 PM     05/16/2025 02:42 PM    K 3.8 05/16/2025 02:42 PM     05/16/2025 02:42 PM    CO2 26.0 05/16/2025 02:42 PM    CREATSERUM 0.71 05/16/2025 02:42 PM    CA 8.9 05/16/2025 02:42 PM    AST 20 05/16/2025 02:42 PM    ALT 12 05/16/2025 02:42 PM    TSH 0.756 09/25/2024 10:08 AM        Lab Results   Component Value Date/Time    CHOLEST 184 05/16/2025 02:42 PM    HDL 66 (H) 05/16/2025 02:42 PM    TRIG 111 05/16/2025 02:42 PM    LDL 98 05/16/2025 02:42 PM    NONHDLC 118 05/16/2025 02:42 PM          Monogamous with partner.  Denies history of STDs.  Per partner no history of STDs.  Patient says she does not use condoms anymore.  When she was causes in past cause some vaginal irritation so stopped using.  Good relationship with partner.  Dyspareunia after sexual relations for 30 minutes and then goes away.  Does not feel clean and with wipes vaginal area with Lysol wipes sometimes occasionally wipes rectal area and wipes separate wipe from front.    Vaginal Discharge  The patient's primary symptoms include genital itching and vaginal discharge. The patient's pertinent negatives include no genital lesions,  genital odor, genital rash, missed menses or pelvic pain. This is a recurrent problem. The current episode started 1 to 4 weeks ago. The problem occurs constantly. The problem has been gradually worsening. Associated symptoms include constipation and dysuria. Pertinent negatives include no abdominal pain, chills, diarrhea, fever, flank pain, frequency, headaches, hematuria, nausea, sore throat, urgency or vomiting. The vaginal discharge was milky, white and watery. There has been no bleeding. She has not been passing clots. She has not been passing tissue. She has tried antifungals (OTC Coconut oil. No baths.) for the symptoms. The treatment provided moderate relief. She is sexually active. She uses oral contraceptives (LNMP:  4-28-25. Using Summer's Missy to wash TID.) for contraception.   Rectal Pain  This is a recurrent problem. The current episode started 1 to 4 weeks ago. The problem occurs daily. The problem has been unchanged. Associated symptoms include a change in bowel habit (Occ. strains with BM. Felt itchiness and lump near rectum.). Pertinent negatives include no abdominal pain, chest pain, chills, coughing, diaphoresis, fatigue, fever, headaches, nausea, numbness, sore throat, vomiting or weakness. Nothing aggravates the symptoms. She has tried nothing for the symptoms.         Review of Systems   Constitutional:  Negative for activity change, appetite change, chills, diaphoresis, fatigue, fever and unexpected weight change.   HENT:  Negative for drooling, sore throat, trouble swallowing and voice change.    Respiratory:  Negative for apnea, cough, choking, chest tightness, shortness of breath, wheezing and stridor.    Cardiovascular:  Negative for chest pain, palpitations and leg swelling.   Gastrointestinal:  Positive for change in bowel habit (Occ. strains with BM. Felt itchiness and lump near rectum.) and constipation. Negative for abdominal distention, abdominal pain, anal bleeding, blood in stool,  diarrhea, nausea, rectal pain and vomiting.   Endocrine: Negative for cold intolerance, heat intolerance, polydipsia, polyphagia and polyuria.   Genitourinary:  Positive for dyspareunia, dysuria and vaginal discharge. Negative for decreased urine volume, difficulty urinating, flank pain, frequency, genital sores, hematuria, menstrual problem, missed menses, pelvic pain, urgency, vaginal bleeding and vaginal pain.   Neurological:  Negative for dizziness, tremors, seizures, syncope, facial asymmetry, speech difficulty, weakness, light-headedness, numbness and headaches.   Psychiatric/Behavioral:  Positive for agitation. Negative for behavioral problems, confusion, decreased concentration, dysphoric mood, hallucinations, self-injury, sleep disturbance and suicidal ideas. The patient is nervous/anxious. The patient is not hyperactive.         Taking care of mom and dad overwhelmed.   All other systems reviewed and are negative.        Current Outpatient Medications   Medication Sig Dispense Refill    fluconazole (DIFLUCAN) 150 MG Oral Tab Take 1 tablet (150 mg total) by mouth every other day. 2 tablet 0    FLUoxetine 20 MG Oral Cap Take 1 capsule (20 mg total) by mouth daily. 30 capsule 2    hydrocortisone (ANUSOL-HC) 25 MG Rectal Suppos Place 1 suppository (25 mg total) rectally 2 (two) times daily. 30 suppository 1    HYDROcodone-acetaminophen 5-325 MG Oral Tab Take 1 tablet by mouth every 6 (six) hours as needed for Pain. No alcohol or driving on this med. Stop if lethargic or hallucinating.  Maximum 4000 mg Tylenol/acetaminophen daily from all sources.  Each Norco has 325 mg of Tylenol/acetaminophen in it. 12 tablet 0    senna-docusate 8.6-50 MG Oral Tab Take 2 tablets by mouth at bedtime. 28 tablet 0    Norgestim-Eth Estrad Triphasic 0.18/0.215/0.25 MG-25 MCG Oral Tab Take 1 tablet by mouth daily. 84 tablet 0    Phentermine HCl 15 MG Oral Cap Take 1 capsule (15 mg total) by mouth every morning. 30 capsule 5     semaglutide (OZEMPIC, 0.25 OR 0.5 MG/DOSE,) 2 MG/3ML Subcutaneous Solution Pen-injector Inject 0.5 mg into the skin once a week. 9 mL 1    atorvastatin 10 MG Oral Tab Take 1 tablet (10 mg total) by mouth nightly. 90 tablet 3    metFORMIN 500 MG Oral Tab Take 1.5 tabs before breakfast and 1.5 tabs before dinner 270 tablet 0    ondansetron (ZOFRAN) 4 mg tablet Take 1 tablet (4 mg total) by mouth every 8 (eight) hours as needed for Nausea. 10 tablet 0    Acetaminophen 500 MG Oral Cap Take 2 capsules (1,000 mg total) by mouth every 8 (eight) hours as needed for Pain. Never exceed 3000 mg in a 24-hour period.  Many over-the-counter medications also have acetaminophen in them. 180 capsule 0    senna-docusate 8.6-50 MG Oral Tab Take 2 tablets by mouth at bedtime. 28 tablet 0    Vitamin C 500 MG Oral Tab Take 1 tablet (500 mg total) by mouth in the morning and 1 tablet (500 mg total) before bedtime. 84 tablet 0    Glucose Blood (ONETOUCH ULTRA) In Vitro Strip CHECK BLOOD SUGAR TWICE DAILY 200 strip 3    fluconazole 200 MG Oral Tab Take 4 tablets (800 mg total) by mouth daily. 28 tablet 0    Lancets Does not apply Misc Check blood sugar twice daily. 50 each 2    Blood Glucose Monitoring Suppl (ONETOUCH ULTRA 2) w/Device Does not apply Kit Check blood sugar 2 times daily 1 kit 0    Continuous Blood Gluc  (DEXCOM G6 ) Does not apply Device 1 each by Does not apply route See Admin Instructions. 1 Device 0    Continuous Blood Gluc Sensor (DEXCOM G6 SENSOR) Does not apply Misc 1 each by Does not apply route See Admin Instructions. 9 each 3    Continuous Blood Gluc Transmit (DEXCOM G6 TRANSMITTER) Does not apply Misc 1 each by Does not apply route See Admin Instructions. 3 each 3    Multiple Vitamin (MULTIVITAMINS OR) Take 1 tablet by mouth in the morning. Bariatric vitamins.       Allergies:No Known Allergies    HISTORY:  Past Medical History:    Abrasion of toe of left foot    Class 3 severe obesity due to excess  calories with serious comorbidity and body mass index (BMI) of 40.0 to 44.9 in adult    Diabetes (HCC)    Diabetes mellitus, type 2 (HCC)    Essential hypertension    High blood pressure    High cholesterol    High cholesterol    History of blood transfusion    no reactions    Hypercholesterolemia with hypertriglyceridemia    Intertrigo    Morbid (severe) obesity due to excess calories (HCC)    Morbid obesity with BMI of 45.0-49.9, adult (HCC)    Morbid obesity with BMI of 50.0-59.9, adult (HCC)    S/P gastric bypass    Uncontrolled type 2 diabetes mellitus with hyperglycemia (HCC)    Visual impairment    GLASSES      Past Surgical History:   Procedure Laterality Date    Hc surgery catheter eps dx/ablation other than 3d [id number]      abdominoplasty, removal of excess skin on bilateral arms    Lap gastric bypass/[id number]-[id number]  09/14/2020    Dr Black, [facility]      Family History   Problem Relation Age of Onset    Diabetes Mother     Breast Cancer Mother 62    Hypertension Father     Diabetes Father     Other (EToHism. ) Maternal Grandmother     Diabetes Paternal Grandmother     Hypertension Paternal Grandmother     Lipids Paternal Grandmother     Renal Disease Paternal Grandmother     Diabetes Paternal Grandfather         Type 1, IDDM with retinoapthy.     Stroke Paternal Grandfather     Other (PVD) Paternal Grandfather     Other (retinopathy) Paternal Grandfather     Breast Cancer Paternal Aunt       Social History:   Social History     Socioeconomic History    Marital status: Single   Occupational History    Occupation: HR.    Tobacco Use    Smoking status: Never     Passive exposure: Never    Smokeless tobacco: Never   Vaping Use    Vaping status: Never Used   Substance and Sexual Activity    Alcohol use: Yes     Comment: socially     Drug use: Never        PHYSICAL EXAM:   /69 (BP Location: Left arm, Patient Position: Sitting, Cuff Size: adult)   Pulse 100   Temp 97.5 °F (36.4 °C) (Temporal)   Ht 4' 10\"  (1.473 m)   Wt 137 lb (62.1 kg)   LMP 03/24/2025   SpO2 100%   BMI 28.63 kg/m²   BP Readings from Last 3 Encounters:   05/16/25 132/69   04/09/25 125/80   01/03/25 132/80     Wt Readings from Last 3 Encounters:   05/16/25 137 lb (62.1 kg)   04/09/25 142 lb (64.4 kg)   02/07/25 139 lb (63 kg)       Physical Exam  Vitals and nursing note reviewed.   Constitutional:       General: She is not in acute distress.     Appearance: Normal appearance. She is well-developed and well-groomed. She is not ill-appearing, toxic-appearing or diaphoretic.      Interventions: She is not intubated.  HENT:      Mouth/Throat:      Comments: Patient wearing mask.   Did not have patient remove mask due to current Covid virus situation.  Eyes:      General: No scleral icterus.     Conjunctiva/sclera: Conjunctivae normal.   Neck:      Thyroid: No thyroid mass or thyromegaly.      Trachea: Trachea and phonation normal.   Cardiovascular:      Rate and Rhythm: Normal rate and regular rhythm.      Pulses: Normal pulses. No decreased pulses.           Carotid pulses are 2+ on the right side and 2+ on the left side.       Radial pulses are 2+ on the right side and 2+ on the left side.        Dorsalis pedis pulses are 2+ on the right side and 2+ on the left side.        Posterior tibial pulses are 2+ on the right side and 2+ on the left side.      Heart sounds: Normal heart sounds, S1 normal and S2 normal.   Pulmonary:      Effort: Pulmonary effort is normal. No tachypnea, bradypnea, accessory muscle usage, prolonged expiration, respiratory distress or retractions. She is not intubated.      Breath sounds: Normal breath sounds and air entry. No stridor, decreased air movement or transmitted upper airway sounds. No decreased breath sounds, wheezing, rhonchi or rales.   Chest:      Chest wall: No tenderness.   Breasts:     Breasts are symmetrical.      Right: No swelling, bleeding, inverted nipple, mass, nipple discharge, skin change or  tenderness.      Left: No swelling, bleeding, inverted nipple, mass, nipple discharge, skin change or tenderness.   Abdominal:      General: Bowel sounds are normal. There is no distension.      Palpations: Abdomen is soft. Abdomen is not rigid. There is no mass.      Tenderness: There is no abdominal tenderness. There is no right CVA tenderness, left CVA tenderness, guarding or rebound.   Genitourinary:     General: Normal vulva.      Exam position: Supine.      Pubic Area: No rash or pubic lice.       Labia:         Right: No rash, tenderness, lesion or injury.         Left: No rash, tenderness, lesion or injury.       Urethra: No prolapse, urethral pain, urethral swelling or urethral lesion.      Vagina: No signs of injury and foreign body. Vaginal discharge (Thick whitish DC.) present. No erythema, tenderness, bleeding, lesions or prolapsed vaginal walls.      Cervix: No cervical motion tenderness, discharge, friability, lesion, erythema, cervical bleeding or eversion.      Uterus: Not deviated, not enlarged, not fixed, not tender and no uterine prolapse.       Adnexa:         Right: No mass, tenderness or fullness.          Left: No mass, tenderness or fullness.        Rectum: Guaiac result negative. External hemorrhoid (Small at 6 o'clock.) present. No mass, tenderness, anal fissure or internal hemorrhoid. Normal anal tone.   Musculoskeletal:      Right lower leg: No edema.      Left lower leg: No edema.   Lymphadenopathy:      Head:      Right side of head: No submental, submandibular, tonsillar, preauricular, posterior auricular or occipital adenopathy.      Left side of head: No submental, submandibular, tonsillar, preauricular, posterior auricular or occipital adenopathy.      Cervical: No cervical adenopathy.      Right cervical: No superficial, deep or posterior cervical adenopathy.     Left cervical: No superficial, deep or posterior cervical adenopathy.      Upper Body:      Right upper body: No  supraclavicular, axillary or pectoral adenopathy.      Left upper body: No supraclavicular, axillary or pectoral adenopathy.      Lower Body: No right inguinal adenopathy. No left inguinal adenopathy.   Skin:     General: Skin is warm and dry.   Neurological:      Mental Status: She is alert and oriented to person, place, and time.   Psychiatric:         Attention and Perception: She is attentive. She does not perceive auditory or visual hallucinations.         Mood and Affect: Mood is anxious. Mood is not depressed or elated. Affect is not labile, blunt, flat, angry, tearful or inappropriate.         Speech: She is communicative. Speech is not rapid and pressured, delayed, slurred or tangential.         Behavior: Behavior normal. Behavior is not agitated, slowed, aggressive, withdrawn, hyperactive or combative. Behavior is cooperative.         Thought Content: Thought content normal. Thought content is not paranoid or delusional. Thought content does not include homicidal or suicidal ideation. Thought content does not include homicidal or suicidal plan.              ASSESSMENT/PLAN:     Encounter Diagnoses   Name Primary?    Type 2 diabetes mellitus with hypoglycemia without coma, without long-term current use of insulin (HCC) Higher. FU endoc. Careful with diet and excercise at least 30 minutes 3-4 times a week. Check sugars at different times on different dates. Careful with low sugars. Carry something with you and check sugar if can. Can carry dylan cracker, etc. Decrease carbohydrates. But also, careful with fruits and natural sugars.One serving a day and no more than 1 handful every day. Check feet  every AM and careful with sores and ulcers on feet bilaterally. Check eyes every year with dilated eye exam.  Check sugars.  2-hour postmeal should be less than 140s.  Pre-meal should be 's.  Both equally affected A1c.  Discussed importance of glycemic control to prevent complications of diabetes  -Discussed  complications of diabetes include retinopathy, neuropathy, nephropathy and cardiovascular disease  -Discussed ABCs of DM  -Discussed importance of SBGM  -Discussed importance of low CHO diet, recommend 45gm per meal or 135gm per day  -Follow up with optho  -Normotensive   Check blood and urine.    Yes    Vaccine counseling Up to date.        Vitamin D deficiency Check blood.        Essential hypertension Stable. Careful with diet and excercise at least 30 minutes 3-4 times a week. Check blood pressures at different times on different days. Can purchase own blood pressure monitor. If not, check at local pharmacy. Bake foods more and grill occasionally. Avoid fried foods. No salt. Use other seasonings.         Anemia, unspecified type Check blood.        Encounter for screening mammogram for breast cancer Check mammogram after 11-18-25. Continue self breast exam every month.            Subacute vaginitis Check Vaginal Cx. Stop wipes. Stop Summer's Missy. Increase probiotics. Try fluconazole 150 every other day for 2 doses with carbonated beverage.     Anxiety. Lew Rules for Coping with Panic      1) Remember that although your feelings and symptoms are frightening, they are neither dangerous nor harmful.  2) Understand that what you are experiencing is merely an exaggeration of your normal reactions to stress.  3) Do not fight your feelings or try to wish them away. The more willing you are to face them, the less intense they will become.  4) Don't add to your panic by thinking about what might happen. If you finding yourself asking, 'What if?' tell yourself, 'So what!'  5) Stay in the present. Be aware of what is happening to you rather than concern yourself with  how much worse it might get.  6) Label your fear level from zero to 10 and watch it go up and down. Notice that it doesn't stay at a very high level for more than a few seconds.  7) When you find yourself thinking about fear, change your what if thinking.  Focus on and perform some simple, manageable task.  8) Notice that when you stop thinking frightening thoughts, your anxiety fades.  9) When fear comes, accept it, don't fight it.  Wait and give it some time to pass. Don't try to escape from it.  10)  Be proud of the progress you've made. Think about how good you will feel when the anxiety has passed and you are in total control and at peace.  Discussed with patient of options.  Add prozac 20 mg a day. Discussed with patient of side effects and use of these medications.  FU counselor.       Dyspareunia afterwards. Check pelvic US. Coconut oil.     Rectal painHemorrhoid at 6 o'clock. Benefiber 1 tsp a day. Anucort supp. once at night for 1 week and then as needed.  And then as needed.Discussed with patient of side effects and use of these medications.  Sitz baths up to 3 times a day.    Orders Placed This Encounter   Procedures    URINALYSIS, AUTO, W/O SCOPE    Trichomonas vaginalis, AIDA (Vaginal/Cervical)    Trichomonas Vaginitis by AIDA [E]    Vitamin D, 25-Hydroxy    HSV 1/2 Subtype by PCR (Lesion only) [E]    MD BLOOD SMEAR CONSULT    Vaginitis Vaginosis PCR Panel    Chlamydia/Gc Amplification       Meds This Visit:  Requested Prescriptions     Signed Prescriptions Disp Refills    fluconazole (DIFLUCAN) 150 MG Oral Tab 2 tablet 0     Sig: Take 1 tablet (150 mg total) by mouth every other day.    FLUoxetine 20 MG Oral Cap 30 capsule 2     Sig: Take 1 capsule (20 mg total) by mouth daily.    hydrocortisone (ANUSOL-HC) 25 MG Rectal Suppos 30 suppository 1     Sig: Place 1 suppository (25 mg total) rectally 2 (two) times daily.       Imaging & Referrals:   NAVIGATOR  OPHTHALMOLOGY - INTERNAL  WALTER PERNELL 2D+3D SCREENING BILAT (CPT=77067/35156)  US PELVIS (TRANSABDOMINAL AND TRANSVAGINAL) (CPT=76856/42104)      [facility] 9-16-25 for physical and pap,

## 2025-05-16 NOTE — PATIENT INSTRUCTIONS
ASSESSMENT/PLAN:     Encounter Diagnoses   Name Primary?    Type 2 diabetes mellitus with hypoglycemia without coma, without long-term current use of insulin (HCC) Higher. FU endoc. Careful with diet and excercise at least 30 minutes 3-4 times a week. Check sugars at different times on different dates. Careful with low sugars. Carry something with you and check sugar if can. Can carry dylan cracker, etc. Decrease carbohydrates. But also, careful with fruits and natural sugars.One serving a day and no more than 1 handful every day. Check feet  every AM and careful with sores and ulcers on feet bilaterally. Check eyes every year with dilated eye exam.  Check sugars.  2-hour postmeal should be less than 140s.  Pre-meal should be 's.  Both equally affected A1c.  Discussed importance of glycemic control to prevent complications of diabetes  -Discussed complications of diabetes include retinopathy, neuropathy, nephropathy and cardiovascular disease  -Discussed ABCs of DM  -Discussed importance of SBGM  -Discussed importance of low CHO diet, recommend 45gm per meal or 135gm per day  -Follow up with optho  -Normotensive   Check blood and urine.    Yes    Vaccine counseling Up to date.        Vitamin D deficiency Check blood.        Essential hypertension Stable. Careful with diet and excercise at least 30 minutes 3-4 times a week. Check blood pressures at different times on different days. Can purchase own blood pressure monitor. If not, check at local pharmacy. Bake foods more and grill occasionally. Avoid fried foods. No salt. Use other seasonings.         Anemia, unspecified type Check blood.        Encounter for screening mammogram for breast cancer Check mammogram after 11-18-25. Continue self breast exam every month.            Subacute vaginitis Check Vaginal Cx. Stop wipes. Stop Summer's Missy. Increase probiotics. Try fluconazole 150 every other day for 2 doses with carbonated beverage.     Anxiety. Lew  Rules for Coping with Panic      1) Remember that although your feelings and symptoms are frightening, they are neither dangerous nor harmful.  2) Understand that what you are experiencing is merely an exaggeration of your normal reactions to stress.  3) Do not fight your feelings or try to wish them away. The more willing you are to face them, the less intense they will become.  4) Don't add to your panic by thinking about what might happen. If you finding yourself asking, 'What if?' tell yourself, 'So what!'  5) Stay in the present. Be aware of what is happening to you rather than concern yourself with  how much worse it might get.  6) Label your fear level from zero to 10 and watch it go up and down. Notice that it doesn't stay at a very high level for more than a few seconds.  7) When you find yourself thinking about fear, change your what if thinking. Focus on and perform some simple, manageable task.  8) Notice that when you stop thinking frightening thoughts, your anxiety fades.  9) When fear comes, accept it, don't fight it.  Wait and give it some time to pass. Don't try to escape from it.  10)  Be proud of the progress you've made. Think about how good you will feel when the anxiety has passed and you are in total control and at peace.  Discussed with patient of options.  Add prozac 20 mg a day. Discussed with patient of side effects and use of these medications.  FU counselor.       Dyspareunia afterwards. Check pelvic US. Coconut oil.     Rectal painHemorrhoid at 6 o'clock. Benefiber 1 tsp a day. Anucort supp. once at night for 1 week and then as needed.  And then as needed.Discussed with patient of side effects and use of these medications.  Sitz baths up to 3 times a day.    Orders Placed This Encounter   Procedures    URINALYSIS, AUTO, W/O SCOPE    Trichomonas vaginalis, AIDA (Vaginal/Cervical)    Trichomonas Vaginitis by AIDA [E]    Vitamin D, 25-Hydroxy    HSV 1/2 Subtype by PCR (Lesion only) [E]    MD  BLOOD SMEAR CONSULT    Vaginitis Vaginosis PCR Panel    Chlamydia/Gc Amplification       Meds This Visit:  Requested Prescriptions     Signed Prescriptions Disp Refills    fluconazole (DIFLUCAN) 150 MG Oral Tab 2 tablet 0     Sig: Take 1 tablet (150 mg total) by mouth every other day.    FLUoxetine 20 MG Oral Cap 30 capsule 2     Sig: Take 1 capsule (20 mg total) by mouth daily.    hydrocortisone (ANUSOL-HC) 25 MG Rectal Suppos 30 suppository 1     Sig: Place 1 suppository (25 mg total) rectally 2 (two) times daily.       Imaging & Referrals:   NAVIGATOR  OPHTHALMOLOGY - INTERNAL  WALTER PERNELL 2D+3D SCREENING BILAT (CPT=77067/82028)  US PELVIS (TRANSABDOMINAL AND TRANSVAGINAL) (CPT=76856/79975)      [facility] 9-16-25 for physical and pap,

## 2025-05-17 ENCOUNTER — PATIENT MESSAGE (OUTPATIENT)
Dept: INTERNAL MEDICINE CLINIC | Facility: CLINIC | Age: 40
End: 2025-05-17

## 2025-05-17 LAB
BASOPHILS # BLD AUTO: 0.03 X10(3) UL (ref 0–0.2)
BASOPHILS NFR BLD AUTO: 0.5 %
DEPRECATED RDW RBC AUTO: 41.1 FL (ref 35.1–46.3)
EOSINOPHIL # BLD AUTO: 0.06 X10(3) UL (ref 0–0.7)
EOSINOPHIL NFR BLD AUTO: 1 %
ERYTHROCYTE [DISTWIDTH] IN BLOOD BY AUTOMATED COUNT: 17.8 % (ref 11–15)
HCT VFR BLD AUTO: 29.9 % (ref 35–48)
HGB BLD-MCNC: 8.3 G/DL (ref 12–16)
IMM GRANULOCYTES # BLD AUTO: 0.02 X10(3) UL (ref 0–1)
IMM GRANULOCYTES NFR BLD: 0.3 %
LYMPHOCYTES # BLD AUTO: 1.98 X10(3) UL (ref 1–4)
LYMPHOCYTES NFR BLD AUTO: 32.2 %
MCH RBC QN AUTO: 18.1 PG (ref 26–34)
MCHC RBC AUTO-ENTMCNC: 27.8 G/DL (ref 31–37)
MCV RBC AUTO: 65.3 FL (ref 80–100)
MONOCYTES # BLD AUTO: 0.31 X10(3) UL (ref 0.1–1)
MONOCYTES NFR BLD AUTO: 5 %
NEUTROPHILS # BLD AUTO: 3.75 X10 (3) UL (ref 1.5–7.7)
NEUTROPHILS # BLD AUTO: 3.75 X10(3) UL (ref 1.5–7.7)
NEUTROPHILS NFR BLD AUTO: 61 %
PLATELET # BLD AUTO: 488 10(3)UL (ref 150–450)
RBC # BLD AUTO: 4.58 X10(6)UL (ref 3.8–5.3)
WBC # BLD AUTO: 6.2 X10(3) UL (ref 4–11)

## 2025-05-17 NOTE — PROGRESS NOTES
CBC abNormal (white blood cells and platelets), anemia is present and platelet count is elevated.  This could be from iron deficiency anemia.  Iron storage is very low.  Goal  is above 50.  Iron levels are very low.  Would recommend iron over-the-counter 65 twice a day with vitamin C 500 once a day and folic acid 1 mg a day over-the-counter and recheck iron levels in 3 months.  Orders written.  Urine looks okay.  Vitamin D level looks good.  Cabrera 30 and 60.  CMP Normal (electrolytes, sugar, kidney and liver functions),   Lipid (choilesterol) is elevated.  Goal LDL should be less than 70.Careful with diet.  Avoid red meat, shellfish, pork.  Try not to crawford foods.  Lower carb diet.  Exercise is most part at least 30 minutes 3-4 times a week sustained cardiovascular aerobic exercise getting that heart rate going and keeping it going for 30 minutes at a time.  If cannot do 30 will start with 10 minutes of brisk walking is good at each week build up 5 minutes more.  Recheck lipid in 3 months.  Orders written.would recommend a low-dose Lipitor 10 mg at night if okay can send to the pharmacy?  Recheck lipid in 3 months.  Orders written.   Hemoglobin A1c which is a 3-month average of sugars is better than prior but still elevated.  Goal is 6.5 or less.Careful with diet and excercise at least 30 minutes 3-4 times a week. Check sugars at different times on different dates. Careful with low sugars. Carry something with you and check sugar if can. Can carry dylan cracker, etc. Decrease carbohydrates. But also, careful with fruits and natural sugars.One serving a day and no more than 1 handful every day. Check feet  every AM and careful with sores and ulcers on feet bilaterally. Check eyes every year with dilated eye exam.  Check sugars.  2-hour postmeal should be less than 140s.  Pre-meal should be 's.  Both equally affected A1c.  Recheck A1c in 3 months.  Orders written.

## 2025-05-18 LAB
BV BACTERIA DNA VAG QL NAA+PROBE: POSITIVE
C GLABRATA DNA VAG QL NAA+PROBE: NEGATIVE
C KRUSEI DNA VAG QL NAA+PROBE: NEGATIVE
CANDIDA DNA VAG QL NAA+PROBE: POSITIVE
T VAGINALIS DNA VAG QL NAA+PROBE: NEGATIVE

## 2025-05-19 LAB
C TRACH DNA SPEC QL NAA+PROBE: NEGATIVE
N GONORRHOEA DNA SPEC QL NAA+PROBE: NEGATIVE
T VAGINALIS RRNA SPEC QL NAA+PROBE: NEGATIVE

## 2025-05-19 RX ORDER — ATORVASTATIN CALCIUM 10 MG/1
10 TABLET, FILM COATED ORAL NIGHTLY
Qty: 90 TABLET | Refills: 3 | Status: SHIPPED | OUTPATIENT
Start: 2025-05-19

## 2025-05-19 NOTE — TELEPHONE ENCOUNTER
Routed to Dr Delgado for advise, thanks.      Requested Prescriptions     Pending Prescriptions Disp Refills    atorvastatin 10 MG Oral Tab 90 tablet 3     Sig: Take 1 tablet (10 mg total) by mouth nightly.       Last Office Visit with PCP: 5/16/2025

## 2025-05-20 ENCOUNTER — OFFICE VISIT (OUTPATIENT)
Dept: PHYSICAL THERAPY | Age: 40
End: 2025-05-20
Attending: STUDENT IN AN ORGANIZED HEALTH CARE EDUCATION/TRAINING PROGRAM
Payer: COMMERCIAL

## 2025-05-20 ENCOUNTER — APPOINTMENT (OUTPATIENT)
Dept: OCCUPATIONAL MEDICINE | Facility: HOSPITAL | Age: 40
End: 2025-05-20
Attending: STUDENT IN AN ORGANIZED HEALTH CARE EDUCATION/TRAINING PROGRAM
Payer: COMMERCIAL

## 2025-05-20 LAB
HSV 1 NAA: NEGATIVE
HSV 1 NAA: NEGATIVE
HSV 2 NAA: NEGATIVE
HSV 2 NAA: NEGATIVE

## 2025-05-20 PROCEDURE — 97140 MANUAL THERAPY 1/> REGIONS: CPT

## 2025-05-20 PROCEDURE — 97110 THERAPEUTIC EXERCISES: CPT

## 2025-05-20 NOTE — PROGRESS NOTES
Patient: Gisell Zepeda (40 year old, female) Referring Provider:  Insurance:   Diagnosis: Carpal tunnel syndrome of right wrist (G56.01)  Finger stiffness, right (M25.641) Luis Umanzoryt  [facility]   Date of Surgery: No data recorded Next MD visit:  N/A   Precautions:  None 5/23/2025 Referral Information:   Date of Injury: No data recorded Date of Evaluation: Req: 12, Auth: 12, Exp: 7/1/2025 04/17/25 POC Auth Visits:  12       Today's Date   5/20/2025    Subjective  Pt states       Pain: 1/10     Objective  See tx details      Assessment  Pt had some mild stiffness in the SF. Pt stated symptoms most likely due to the weather. Advised pt to apply heat to manage symptoms. Issued pt PROM stretches and digit blocking to encourage finger flexion. Noted SF adduction weakness.  Issued pt ratna tape to allow for improved ROM of adduction and flexion of SF. Instructed pt to wear at all times during the day. Pt to remove at night and when performing exercises.  Pt demonstrated good understanding of HEP. Pt continues to wear static serial cast for extensor lag. Pt reported she has difficulty  with wearing static serial cast due to swelling. Advised pt to run cool water on finger. Pt agreed.     Goals (to be met in 12 visits)   Increase AROM (R) wrist flexion by 5-10 degrees to allow pt to perform housework.  Increase AROM (R) wrist extension by 5-10 degrees to allow pt to carry items in hand.   Increase AROM (R) wrist UD by 5-10 degrees to allow pt to  open jars.     Increase AROM (R) wrist RD by 5-10 degrees to allow pt to wash hair.   Increase BERGERON of (R) IF by 10-20 degrees to allow pt to hold utensils.   Increase BERGERON of (R) MF by 10-20 degrees to allow pt to don/doff bra.  Increase BERGERON of (R) RF by 10-20 degrees to allow pt to perform laundry.    Increase BERGERON of (R) SF by 10-20 degrees to allow pt to wash dishes.   Pt will verbalize 2/3 proper body mechanics with 100% accuracy to be able to perform functional activities  at home.                             Plan  2x week/6 weeksx/week or a total of 12 visits over a 90 day period.    Treatment Last 4 Visits        5/8/2025 5/13/2025 5/15/2025 5/20/2025   OT Treatment   Treatment Day 6 7 8 9   Therapeutic Exercise Wrist excerciser x 3 min  SB walk up x 3 min  Static serial cast Velcro board  Hook fist w/ wooden pegs  Cane winding w/ 2.5Ibs ankle weights   Wooden Velcro x 2 min  Powerweb stretches x 2 min  Cane winding w/ 2.5Ibs x 2 min  Bicep curls w/ 2Ibs x 2min  Tricep curls w/ 2Ibs x 2 min  Handhelper w/ 2Y RB x 2 min Powerweb x 3 min  Digiflex (Y) x 2 min   Manual Therapy STM STM  Edema Massage STM STM  Retrograde massage   Modalities   MHP x 5\"    Therapeutic Exercise Min 35 35 35 35   Manual Therapy Min 10 10 10 10   Total Timed Procedures 45 45 45 45   Total Service Procedures 45 45 45 45   Total Time 45 45 45 45         HEP  Passive finger stretches  Digit Blocking     Charges     1MT, 2TE    Jessica Mccann OTR/RONY CrowT

## 2025-05-21 ENCOUNTER — TELEPHONE (OUTPATIENT)
Age: 40
End: 2025-05-21

## 2025-05-21 NOTE — TELEPHONE ENCOUNTER
Hello - I am reaching out from the [facility] Navigation department, following up on an order from your provider's office to assist in connecting you with resources for care. If you would like to discuss this further, please give us a call at [phone], or for more immediate assistance you can contact our 24-hour help line at [phone]. We look forward to hearing from you soon.

## 2025-05-22 ENCOUNTER — APPOINTMENT (OUTPATIENT)
Dept: PHYSICAL THERAPY | Age: 40
End: 2025-05-22
Attending: STUDENT IN AN ORGANIZED HEALTH CARE EDUCATION/TRAINING PROGRAM
Payer: COMMERCIAL

## 2025-05-22 ENCOUNTER — APPOINTMENT (OUTPATIENT)
Dept: OCCUPATIONAL MEDICINE | Facility: HOSPITAL | Age: 40
End: 2025-05-22
Attending: STUDENT IN AN ORGANIZED HEALTH CARE EDUCATION/TRAINING PROGRAM
Payer: COMMERCIAL

## 2025-05-23 ENCOUNTER — OFFICE VISIT (OUTPATIENT)
Dept: PHYSICAL THERAPY | Age: 40
End: 2025-05-23
Attending: STUDENT IN AN ORGANIZED HEALTH CARE EDUCATION/TRAINING PROGRAM
Payer: COMMERCIAL

## 2025-05-23 PROCEDURE — 97110 THERAPEUTIC EXERCISES: CPT

## 2025-05-23 NOTE — PROGRESS NOTES
Progress Note  Patient: Gisell Zepeda (40 year old, female) Referring Provider:  Insurance:   Diagnosis: Carpal tunnel syndrome of right wrist (G56.01)  Finger stiffness, right (M25.641) Luis Chaparro  [facility]   Date of Surgery: No data recorded Next MD visit:  N/A   Precautions:  None 5/23/2025 Referral Information:   Date of Injury: No data recorded Date of Evaluation: Req: 12, Auth: 12, Exp: 7/1/2025 04/17/25 POC Auth Visits:  12       Today's Date   5/23/2025    Subjective  Pt states she feels less fluid at CTR       Pain: 0/10     Objective  See tx details    Wrist       4/17/2025 5/23/2025   Wrist ROM/MMT   Rt Wrist Flex (C7) 15 65   Lt Wrist Flex (C7) 60    Rt Wrist ext (C6) 35 55   Lt Wrist ext (C6) 50    Rt Wrist Ulnar Deviation 30 40   Lt Wrist Ulnar Deviation 24    Rt Wrist Radial Deviation 15 15   Lt Wrist Radial Deviation 40     Rt Hand       4/17/2025 5/23/2025   R Hand ROM/MMT   MP Index Rt 60 75   PIP Index RT 70 95   DIP Index RT 45 65   BERGERON Index  235   MP Middle RT 56 75   PIP Middle RT 65 90   DIP Middle RT 50 70   BERGERON Middle  235   MP Ring RT 45 75   PIP Ring RT 70 90   DIP Ring RT 45 70   BERGERON Ring  235   MP Small RT 30 65   PIP Small RT 45 60   DIP Small RT 25 40   BERGERON Small  165    Hand Strength       5/23/2025   Hand Strength    Rt 17    Lt 37   2 Pt Pinch Rt 6   2 Pt Pinch Lt 8   3 Pt Pinch Rt 9   3 Pt Pinch Lt 10   Lateral Pinch Rt 11   Lateral Pinch Lt 13            Assessment  Pt has been seen 10/12 visits since initial evaluation. Treatment consisted of therapeutic exercises, therapeutic activities, manual therapy, neuromuscular reeducation, pt education, HEP, and modalities, such as fluidotherapy and moist hot pack. Based on objective measurements pt presented with increased ROM of wrist and digits. However, upon current objective measurements pt displays decreased pinch and  strength. Pt is able to perform functional activities such as  dressing, grooming, and homemaking. 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 Pt also enjoys participating in physical activities such as cross bowing and crafting which requires gripping and pinching. Due to swelling/fluid buildup and pain at CTR, unable to progress with strengthening. Symptoms currently have reduced and therefore will be able to progress pt. Recommended continuation of skilled OT for 2x week/4 weeks for a total of 8 visits to address remaining deficits, improve strength for pt to return to PLOF.    Goals (to be met in 12 visits)   Increase AROM (R) wrist flexion by 5-10 degrees to allow pt to perform housework.MET  Increase AROM (R) wrist extension by 5-10 degrees to allow pt to carry items in hand. MET  Increase AROM (R) wrist UD by 5-10 degrees to allow pt to  open jars. MET    Increase AROM (R) wrist RD by 5-10 degrees to allow pt to wash hair. Continue  Increase BERGERON of (R) IF by 10-20 degrees to allow pt to hold utensils. MET  Increase BERGERON of (R) MF by 10-20 degrees to allow pt to don/doff bra.MET  Increase BERGERON of (R) RF by 10-20 degrees to allow pt to perform laundry. MET   Increase BERGERON of (R) SF by 10-20 degrees to allow pt to wash dishes. MET upgrade 20-30  Pt will verbalize 2/3 proper body mechanics with 100% accuracy to be able to perform functional activities at home. MET    Additional Goals:  Increase (R)  strength by 5-10Ibs to allow pt to carry groceries.  Increase (R) 2pt pinch by 2-3Ibs to allow pt to hold a paint brush.  Increase (R)3pt pinch by 2-3Ibs to allow pt to open packages.  Increase (R) Lat pinch by 2-3Ibs to allow pt to cut with a knife.         Plan  2x week/6 weeksx/week or a total of 12 visits over a 90 day period.    Treatment Last 4 Visits        5/13/2025 5/15/2025 5/20/2025 5/23/2025   OT Treatment   Treatment Day 7 8 9 10   Therapeutic Exercise Velcro board  Hook fist w/ wooden pegs  Cane winding w/  2.5Ibs ankle weights   Wooden Velcro x 2 min  Powerweb stretches x 2 min  Cane winding w/ 2.5Ibs x 2 min  Bicep curls w/ 2Ibs x 2min  Tricep curls w/ 2Ibs x 2 min  Handhelper w/ 2Y RB x 2 min Powerweb x 3 min  Digiflex (Y) x 2 min AROM w/ fluidotherapy x 10 min  SB rollout  Bicep curls 2Ibs x 2 min   Tricep curls 2Ibs x 2 min   Objective Measurements   Manual Therapy STM  Edema Massage STM STM  Retrograde massage    Modalities  MHP x 5\"     Therapeutic Exercise Min 35 35 35 45   Manual Therapy Min 10 10 10    Total Timed Procedures 45 45 45 45   Total Service Procedures 45 45 45 45   Total Time 45 45 45 45         Charges     3TE    DARIO Dotson, OTR/L

## 2025-05-28 ENCOUNTER — APPOINTMENT (OUTPATIENT)
Dept: OCCUPATIONAL MEDICINE | Facility: HOSPITAL | Age: 40
End: 2025-05-28
Attending: STUDENT IN AN ORGANIZED HEALTH CARE EDUCATION/TRAINING PROGRAM
Payer: COMMERCIAL

## 2025-06-02 ENCOUNTER — APPOINTMENT (OUTPATIENT)
Dept: OCCUPATIONAL MEDICINE | Facility: HOSPITAL | Age: 40
End: 2025-06-02
Attending: STUDENT IN AN ORGANIZED HEALTH CARE EDUCATION/TRAINING PROGRAM
Payer: COMMERCIAL

## 2025-06-03 ENCOUNTER — OFFICE VISIT (OUTPATIENT)
Dept: PHYSICAL THERAPY | Age: 40
End: 2025-06-03
Attending: STUDENT IN AN ORGANIZED HEALTH CARE EDUCATION/TRAINING PROGRAM
Payer: COMMERCIAL

## 2025-06-03 PROCEDURE — 97110 THERAPEUTIC EXERCISES: CPT

## 2025-06-03 PROCEDURE — 97140 MANUAL THERAPY 1/> REGIONS: CPT

## 2025-06-03 NOTE — PROGRESS NOTES
Patient: Gisell Zepeda (40 year old, female) Referring Provider:  Insurance:   Diagnosis: Carpal tunnel syndrome of right wrist (G56.01)  Finger stiffness, right (M25.641) Luis Chaparro  [facility]   Date of Surgery: No data recorded Next MD visit:  N/A   Precautions:  None 5/23/2025 Referral Information:   Date of Injury: No data recorded Date of Evaluation: Req: 12, Auth: 12, Exp: 7/1/2025 04/17/25 POC Auth Visits:  18       Today's Date   6/3/2025    Subjective  Pt states there is still some fluid at CTR       Pain: 2/10     Objective  See tx details        Assessment  Scar was slightly dense at CTR contributing to tightness in the hand. Pt reported she has been performing her finger excercises as instructed. However pt continues to display limited ROM at R(SF) likely due to stiffness from weather. Pt has been applying heat to manage symptoms. Fabricated flexion strap to increase ROM. Instructed to wear 3-4 times a day. Pt verbalized good understanding. Pt performed excercises during session with no complain of pain.    Goals (to be met in 18 visits)   Increase AROM (R) wrist RD by 5-10 degrees to allow pt to wash hair.   Increase BERGERON of (R) SF by 20-30 degrees to allow pt to wash dishes.   Pt will verbalize 2/3 proper body mechanics with 100% accuracy to be able to perform functional activities at home.   Increase (R)  strength by 5-10Ibs to allow pt to carry groceries.  Increase (R) 2pt pinch by 2-3Ibs to allow pt to hold a paint brush.  Increase (R)3pt pinch by 2-3Ibs to allow pt to open packages.  Increase (R) Lat pinch by 2-3Ibs to allow pt to cut with a knife.       Plan  Continue therapy for an additional 2x week/4 weeksx/week or a total of 18 visits over a 90 day period.    Treatment Last 4 Visits        5/15/2025 5/20/2025 5/23/2025 6/3/2025   OT Treatment   Treatment Day 8 9 10 11   Therapeutic Exercise Wooden Velcro x 2 min  Powerweb stretches x 2 min  Cane winding w/ 2.5Ibs x 2 min  Bicep curls  w/ 2Ibs x 2min  Tricep curls w/ 2Ibs x 2 min  Handhelper w/ 2Y RB x 2 min Powerweb x 3 min  Digiflex (Y) x 2 min AROM w/ fluidotherapy x 10 min  SB rollout  Bicep curls 2Ibs x 2 min   Tricep curls 2Ibs x 2 min   Objective Measurements PROM digits   Powerweb stretches x 3 min  Digiflex (red) x 2 min  Flexion strap          Manual Therapy STM STM  Retrograde massage  STM     Modalities MHP x 5\"   Paraffin with digits in flexion with coban wrap x 5\"   Therapeutic Exercise Min 35 35 45 30   Manual Therapy Min 10 10  10   Other Therapy Min    5   Total Timed Procedures 45 45 45 40   Total Service Procedures 45 45 45 45   Total Time 45 45 45 45         Charges     1MT, 2TE    Nieves Richmond, DARIO Mccann, OTR/L

## 2025-06-04 ENCOUNTER — APPOINTMENT (OUTPATIENT)
Dept: OCCUPATIONAL MEDICINE | Facility: HOSPITAL | Age: 40
End: 2025-06-04
Attending: STUDENT IN AN ORGANIZED HEALTH CARE EDUCATION/TRAINING PROGRAM
Payer: COMMERCIAL

## 2025-06-05 ENCOUNTER — APPOINTMENT (OUTPATIENT)
Dept: PHYSICAL THERAPY | Age: 40
End: 2025-06-05
Attending: STUDENT IN AN ORGANIZED HEALTH CARE EDUCATION/TRAINING PROGRAM
Payer: COMMERCIAL

## 2025-06-05 ENCOUNTER — OFFICE VISIT (OUTPATIENT)
Dept: SURGERY | Facility: CLINIC | Age: 40
End: 2025-06-05
Payer: COMMERCIAL

## 2025-06-05 VITALS
SYSTOLIC BLOOD PRESSURE: 110 MMHG | RESPIRATION RATE: 16 BRPM | HEIGHT: 57.2 IN | HEART RATE: 88 BPM | OXYGEN SATURATION: 100 % | DIASTOLIC BLOOD PRESSURE: 60 MMHG | WEIGHT: 136 LBS | BODY MASS INDEX: 29.34 KG/M2

## 2025-06-05 DIAGNOSIS — E44.1 MILD PROTEIN-CALORIE MALNUTRITION (HCC): Primary | ICD-10-CM

## 2025-06-05 DIAGNOSIS — E53.8 B12 DEFICIENCY: ICD-10-CM

## 2025-06-05 DIAGNOSIS — Z98.84 S/P GASTRIC BYPASS: ICD-10-CM

## 2025-06-05 DIAGNOSIS — E66.3 OVERWEIGHT (BMI 25.0-29.9): ICD-10-CM

## 2025-06-05 DIAGNOSIS — E11.649 TYPE 2 DIABETES MELLITUS WITH HYPOGLYCEMIA WITHOUT COMA, WITHOUT LONG-TERM CURRENT USE OF INSULIN (HCC): ICD-10-CM

## 2025-06-05 PROCEDURE — 3008F BODY MASS INDEX DOCD: CPT | Performed by: INTERNAL MEDICINE

## 2025-06-05 PROCEDURE — 3074F SYST BP LT 130 MM HG: CPT | Performed by: INTERNAL MEDICINE

## 2025-06-05 PROCEDURE — 3078F DIAST BP <80 MM HG: CPT | Performed by: INTERNAL MEDICINE

## 2025-06-05 PROCEDURE — 99214 OFFICE O/P EST MOD 30 MIN: CPT | Performed by: INTERNAL MEDICINE

## 2025-06-05 RX ORDER — CYANOCOBALAMIN 1000 UG/ML
1000 INJECTION, SOLUTION INTRAMUSCULAR; SUBCUTANEOUS
Qty: 1 EACH | Refills: 11 | Status: SHIPPED | OUTPATIENT
Start: 2025-06-05

## 2025-06-05 RX ORDER — SEMAGLUTIDE 0.68 MG/ML
0.5 INJECTION, SOLUTION SUBCUTANEOUS WEEKLY
Qty: 3 ML | Refills: 11 | Status: SHIPPED | OUTPATIENT
Start: 2025-06-05 | End: 2025-09-03

## 2025-06-05 RX ORDER — PHENTERMINE HYDROCHLORIDE 15 MG/1
15 CAPSULE ORAL EVERY MORNING
Qty: 30 CAPSULE | Refills: 5 | Status: SHIPPED | OUTPATIENT
Start: 2025-06-05

## 2025-06-05 RX ORDER — NEEDLES, SAFETY 18GX1 1/2"
NEEDLE, DISPOSABLE MISCELLANEOUS
Qty: 1 EACH | Refills: 11 | Status: SHIPPED | OUTPATIENT
Start: 2025-06-05

## 2025-06-05 NOTE — PROGRESS NOTES
[facility], [facility], [place]  [address]  [facility] [address]  Dept: [phone]    Patient:  Gisell Zepeda  :      1985  MRN:      [id number]    Referring Provider: Althea Delgado MD     Chief Complaint:     Chief Complaint   Patient presents with    Follow - Up    Weight Management       Date of Surgery:   2020  Surgery Type:   Laparoscopic gastric bypass surgery     Subjective     Satiety:  positive  Food Intake:  <01 cup(s)  Fluid Intake:  adeq oz  Protein Intake:  adeq grams  Vitamin:  Yes   Bariatric choice  Exercise: Yes  Comorbidities:  SOB/BURRELL-Improvement?  yes, Back pain-Improvement?  yes, Joint pain-Improvement?  yes, Diabetes-Improvement?  yes, Hypertension-Improvement?  yes, TERESE-Improvement?  yes and Snoring-Improvement?  yes    Objective     Vitals: /60   Pulse 88   Resp 16   Ht 4' 9.2\" (1.453 m)   Wt 136 lb (61.7 kg)   LMP 2025   SpO2 100%   BMI 29.22 kg/m²     Starting weight: 233   Current weight:    Interval weight loss: -03   Total weight loss:  -97    Last 3 Weights   25 0930 136 lb (61.7 kg)   25 1324 137 lb (62.1 kg)   25 1313 142 lb (64.4 kg)   25 0905 139 lb (63 kg)       Patient Medications:    Current Outpatient Medications:     atorvastatin 10 MG Oral Tab, Take 1 tablet (10 mg total) by mouth nightly., Disp: 90 tablet, Rfl: 3    atorvastatin 10 MG Oral Tab, Take 1 tablet (10 mg total) by mouth nightly., Disp: 90 tablet, Rfl: 3    fluconazole (DIFLUCAN) 150 MG Oral Tab, Take 1 tablet (150 mg total) by mouth every other day., Disp: 2 tablet, Rfl: 0    FLUoxetine 20 MG Oral Cap, Take 1 capsule (20 mg total) by mouth daily., Disp: 30 capsule, Rfl: 2    hydrocortisone (ANUSOL-HC) 25 MG Rectal Suppos, Place 1 suppository (25 mg total) rectally 2 (two) times daily., Disp: 30 suppository, Rfl: 1    HYDROcodone-acetaminophen 5-325 MG Oral Tab, Take 1 tablet by mouth every 6  (six) hours as needed for Pain. No alcohol or driving on this med. Stop if lethargic or hallucinating.  Maximum 4000 mg Tylenol/acetaminophen daily from all sources.  Each Norco has 325 mg of Tylenol/acetaminophen in it., Disp: 12 tablet, Rfl: 0    senna-docusate 8.6-50 MG Oral Tab, Take 2 tablets by mouth at bedtime., Disp: 28 tablet, Rfl: 0    Norgestim-Eth Estrad Triphasic 0.18/0.215/0.25 MG-25 MCG Oral Tab, Take 1 tablet by mouth daily., Disp: 84 tablet, Rfl: 0    Phentermine HCl 15 MG Oral Cap, Take 1 capsule (15 mg total) by mouth every morning., Disp: 30 capsule, Rfl: 5    semaglutide (OZEMPIC, 0.25 OR 0.5 MG/DOSE,) 2 MG/3ML Subcutaneous Solution Pen-injector, Inject 0.5 mg into the skin once a week., Disp: 9 mL, Rfl: 1    metFORMIN 500 MG Oral Tab, Take 1.5 tabs before breakfast and 1.5 tabs before dinner, Disp: 270 tablet, Rfl: 0    ondansetron (ZOFRAN) 4 mg tablet, Take 1 tablet (4 mg total) by mouth every 8 (eight) hours as needed for Nausea., Disp: 10 tablet, Rfl: 0    Acetaminophen 500 MG Oral Cap, Take 2 capsules (1,000 mg total) by mouth every 8 (eight) hours as needed for Pain. Never exceed 3000 mg in a 24-hour period.  Many over-the-counter medications also have acetaminophen in them., Disp: 180 capsule, Rfl: 0    senna-docusate 8.6-50 MG Oral Tab, Take 2 tablets by mouth at bedtime., Disp: 28 tablet, Rfl: 0    Vitamin C 500 MG Oral Tab, Take 1 tablet (500 mg total) by mouth in the morning and 1 tablet (500 mg total) before bedtime., Disp: 84 tablet, Rfl: 0    Glucose Blood (ONETOUCH ULTRA) In Vitro Strip, CHECK BLOOD SUGAR TWICE DAILY, Disp: 200 strip, Rfl: 3    fluconazole 200 MG Oral Tab, Take 4 tablets (800 mg total) by mouth daily., Disp: 28 tablet, Rfl: 0    Lancets Does not apply Misc, Check blood sugar twice daily., Disp: 50 each, Rfl: 2    Blood Glucose Monitoring Suppl (ONETOUCH ULTRA 2) w/Device Does not apply Kit, Check blood sugar 2 times daily, Disp: 1 kit, Rfl: 0    Continuous Blood  Gluc  (DEXCOM G6 ) Does not apply Device, 1 each by Does not apply route See Admin Instructions., Disp: 1 Device, Rfl: 0    Continuous Blood Gluc Sensor (DEXCOM G6 SENSOR) Does not apply Misc, 1 each by Does not apply route See Admin Instructions., Disp: 9 each, Rfl: 3    Continuous Blood Gluc Transmit (DEXCOM G6 TRANSMITTER) Does not apply Misc, 1 each by Does not apply route See Admin Instructions., Disp: 3 each, Rfl: 3    Multiple Vitamin (MULTIVITAMINS OR), Take 1 tablet by mouth in the morning. Bariatric vitamins., Disp: , Rfl:     Allergies:  Patient has no known allergies.     Social History:    Social History     Socioeconomic History    Marital status: Single   Occupational History    Occupation: HR.    Tobacco Use    Smoking status: Never     Passive exposure: Never    Smokeless tobacco: Never   Vaping Use    Vaping status: Never Used   Substance and Sexual Activity    Alcohol use: Yes     Comment: socially     Drug use: Never     Surgical History:    Past Surgical History:   Procedure Laterality Date    Hc surgery catheter eps dx/ablation other than 3d c1733      abdominoplasty, removal of excess skin on bilateral arms    Lap gastric bypass/nayan-en-y  09/14/2020    Dr Black, [facility]       Family History:    Family History   Problem Relation Age of Onset    Diabetes Mother     Breast Cancer Mother 62    Hypertension Father     Diabetes Father     Other (EToHism. ) Maternal Grandmother     Diabetes Paternal Grandmother     Hypertension Paternal Grandmother     Lipids Paternal Grandmother     Renal Disease Paternal Grandmother     Diabetes Paternal Grandfather         Type 1, IDDM with retinoapthy.     Stroke Paternal Grandfather     Other (PVD) Paternal Grandfather     Other (retinopathy) Paternal Grandfather     Breast Cancer Paternal Aunt        Physical Exam:  Vital signs: /60   Pulse 88   Resp 16   Ht 4' 9.2\" (1.453 m)   Wt 136 lb (61.7 kg)   LMP 03/24/2025   SpO2  100%   BMI 29.22 kg/m²   General appearance: alert, appears stated age, cooperative and mildly obese  Head: Normocephalic, without obvious abnormality, atraumatic  Eyes: conjunctivae/corneas clear. PERRL, EOM's intact. Fundi benign.  Neck: no adenopathy, no carotid bruit, no JVD, supple, symmetrical, trachea midline and thyroid not enlarged, symmetric, no tenderness/mass/nodules  Back: symmetric, no curvature. ROM normal. No CVA tenderness.  Lungs: clear to auscultation bilaterally  Heart: S1, S2 normal, no murmur, click, rub or gallop, regular rate and rhythm  Abdomen: incicisons are healing well  Extremities: extremities normal, atraumatic, no cyanosis or edema  Pulses: 2+ and symmetric  Neurologic: Grossly normal       ROS:    Constitutional: negative  Respiratory: negative  Cardiovascular: negative  Gastrointestinal: negative  Musculoskeletal:negative  Neurological: negative  Behavioral/Psych: negative  Endocrine: negative  All other systems were reviewed and are negative    Assessment     DIABETES:  The patient's blood sugars were reviewed with the patient with averages ranging from .  The patient denies any episodes of hypoglycemia since her last clinic visit.  she denies any lower extremity skin breakdown or foot ulcers.    HYPERTENSION:  The patient's blood pressure has been well controlled.  she has been checking it as instructed and has remained in relatively good control.    HYPERCHOLESTEROLEMIA:  The patient states that her cholesterol has been well controlled on her current medication.    Lab Results   Component Value Date/Time    CHOLEST 184 05/16/2025 02:42 PM    LDL 98 05/16/2025 02:42 PM    HDL 66 (H) 05/16/2025 02:42 PM    TRIG 111 05/16/2025 02:42 PM    VLDL 18 05/16/2025 02:42 PM       Encounter Diagnosis(ses):   Encounter Diagnoses   Name Primary?    Mild protein-calorie malnutrition (HCC) Yes    Type 2 diabetes mellitus with hypoglycemia without coma, without long-term current use of  insulin (HCC)     S/P gastric bypass     Overweight (BMI 25.0-29.9)        Plan     S/P Pancho en y 09/14/2020    Doing well    DM2:  Blood sugars elevated after she goes for a walk  continue metformin 500 BID      Continue to work out as scheduled    Follow up with RD and surgical team as scheduled    Needs thiamine level       PHENTERMINE: tolerating well  ekg done    Low b12:   Start IM b12 at home monthly       abdominoplasty with muscle repair/lipo in [facility]  breast lift and arms done (Aug)  May get leg lift       No orders of the defined types were placed in this encounter.        Rex Fishman MD

## 2025-06-09 ENCOUNTER — APPOINTMENT (OUTPATIENT)
Dept: OCCUPATIONAL MEDICINE | Facility: HOSPITAL | Age: 40
End: 2025-06-09
Attending: STUDENT IN AN ORGANIZED HEALTH CARE EDUCATION/TRAINING PROGRAM
Payer: COMMERCIAL

## 2025-06-10 ENCOUNTER — OFFICE VISIT (OUTPATIENT)
Dept: PHYSICAL THERAPY | Age: 40
End: 2025-06-10
Attending: STUDENT IN AN ORGANIZED HEALTH CARE EDUCATION/TRAINING PROGRAM
Payer: COMMERCIAL

## 2025-06-10 PROCEDURE — 97140 MANUAL THERAPY 1/> REGIONS: CPT

## 2025-06-10 PROCEDURE — 97110 THERAPEUTIC EXERCISES: CPT

## 2025-06-12 ENCOUNTER — OFFICE VISIT (OUTPATIENT)
Dept: PHYSICAL THERAPY | Age: 40
End: 2025-06-12
Attending: STUDENT IN AN ORGANIZED HEALTH CARE EDUCATION/TRAINING PROGRAM
Payer: COMMERCIAL

## 2025-06-12 PROCEDURE — 97140 MANUAL THERAPY 1/> REGIONS: CPT

## 2025-06-12 PROCEDURE — 97110 THERAPEUTIC EXERCISES: CPT

## 2025-06-12 NOTE — PROGRESS NOTES
Patient: Gisell Zepeda (40 year old, female) Referring Provider:  Insurance:   Diagnosis: Carpal tunnel syndrome of right wrist (G56.01)  Finger stiffness, right (M25.641) Luis Mayfield  [facility]   Date of Surgery: No data recorded Next MD visit:  N/A   Precautions:  None 5/23/2025 Referral Information:   Date of Injury: No data recorded Date of Evaluation: Req: 17, Auth: 17, Exp: 8/31/2025 04/17/25 POC Auth Visits:  18       Today's Date   6/12/2025    Subjective  Pt states that her hand is feeling better       Pain: 1/10     Objective  See tx details           Assessment  Mild decreased in scar tissue noted. Improved ROM noted in wrist. Mild edema continues to be present. Pt using hand for light fucntional actvites but continues to have diffiulty with lifting/carrying due to weakness. Pt progressign well with strenghening at this time.    Goals (to be met in 18 visits)   Increase AROM (R) wrist RD by 5-10 degrees to allow pt to wash hair.   Increase BERGERON of (R) SF by 20-30 degrees to allow pt to wash dishes.   Pt will verbalize 2/3 proper body mechanics with 100% accuracy to be able to perform functional activities at home.   Increase (R)  strength by 5-10Ibs to allow pt to carry groceries.  Increase (R) 2pt pinch by 2-3Ibs to allow pt to hold a paint brush.  Increase (R)3pt pinch by 2-3Ibs to allow pt to open packages.  Increase (R) Lat pinch by 2-3Ibs to allow pt to cut with a knife.         Plan  Continue therapy for an additional 2x week/4 weeksx/week or a total of 18 visits over a 90 day period.    Treatment Last 4 Visits        5/23/2025 6/3/2025 6/10/2025 6/12/2025   OT Treatment   Treatment Day 10 11 12 13    13   Therapeutic Exercise AROM w/ fluidotherapy x 10 min  SB rollout  Bicep curls 2Ibs x 2 min   Tricep curls 2Ibs x 2 min   Objective Measurements PROM digits   Powerweb stretches x 3 min  Digiflex (red) x 2 min  Flexion strap        AROM w/ fluidotherapy x 10 min  Handhelper 2 Y RB, 1 R RB x  3 min  Digiflex (red) x 3 min  Wrist PRE's 2 # (all planes) x 2 min each   AROM w/ fluidotherapy x 10 min  Powerweb (green) stretches x 3 min  Handhelper 2 Y RB, 1 R RB x 3 min  Digiflex (red) x 3 min  Wrist PRE's 2 # (all planes) x 2 min each     Manual Therapy  STM   STM  Scar massage with extractor  Edema massage STM  Scar massage with extractor  Edema massage     Modalities  Paraffin with digits in flexion with coban wrap x 5\"     Therapeutic Exercise Min 45 30 35 35   Manual Therapy Min  10 10 10   Other Therapy Min  5     Total Timed Procedures 45 40 45 45   Total Service Procedures 45 45 45 45   Total Time 45 45 45 45       Multiple values from one day are sorted in reverse-chronological order            Charges     1 MT, 2 TC Mccann,OTR/L

## 2025-06-13 ENCOUNTER — TELEPHONE (OUTPATIENT)
Dept: INTERNAL MEDICINE CLINIC | Facility: CLINIC | Age: 40
End: 2025-06-13

## 2025-06-13 ENCOUNTER — TELEPHONE (OUTPATIENT)
Age: 40
End: 2025-06-13

## 2025-06-13 NOTE — TELEPHONE ENCOUNTER
----- Message from Dayna CABRAL sent at 6/13/2025 12:18 PM CDT -----  Regarding: Behavioral Health Navigation Follow Up  Good Afternoon Dr. Delgado,    I left messages with my contact information and have not heard back from the patient. In my last message I also provided the [facility] 24/7 Helpline number just in case [phone] ([phone]). I am closing the order at this time. Please feel free to re-refer the patient for navigation as needed.       Please let me know if there is anything else I can do.   Dayna Velasquez, [facility]  Behavioral Health Navigator   [facility]   24/7 Crisis Line [phone] ([phone])

## 2025-06-13 NOTE — TELEPHONE ENCOUNTER
Hello,  Sorry I missed you - I am reaching out from the [facility] Navigation department, following up on an order from your provider's office to assist in connecting you with resources for care. If you would like to discuss this further, please give us a call back at [phone], or for more immediate assistance you can contact our 24-hour help line at [phone] We look forward to hearing from you soon.

## 2025-06-13 NOTE — TELEPHONE ENCOUNTER
Called the patient and let her know they have been trying to reach her. She said her phone has not been working properly even when her family calls her. Gave her the number for the crisis line.

## 2025-06-17 ENCOUNTER — OFFICE VISIT (OUTPATIENT)
Dept: PHYSICAL THERAPY | Age: 40
End: 2025-06-17
Attending: STUDENT IN AN ORGANIZED HEALTH CARE EDUCATION/TRAINING PROGRAM
Payer: COMMERCIAL

## 2025-06-17 PROCEDURE — 97110 THERAPEUTIC EXERCISES: CPT

## 2025-06-17 PROCEDURE — 97140 MANUAL THERAPY 1/> REGIONS: CPT

## 2025-06-17 NOTE — PROGRESS NOTES
Patient: Gisell Zepeda (40 year old, female) Referring Provider:  Insurance:   Diagnosis: Carpal tunnel syndrome of right wrist (G56.01)  Finger stiffness, right (M25.641) Luis Mayfield  [facility]   Date of Surgery: No data recorded Next MD visit:  N/A   Precautions:  None 5/23/2025 Referral Information:   Date of Injury: No data recorded Date of Evaluation: Req: 17, Auth: 17, Exp: 8/31/2025 04/17/25 POC Auth Visits:  17       Today's Date   6/17/2025    Subjective  Pt states that she still can't make a full fist with her small finger.       Pain: 0 /10     Objective  See tx details           Assessment  Pt continues to have mild swelling at base of hand. Pt also has limited small finger flexion. Pt able to achieve full flexion passively but unable to maintain fist. Instructed pt to continue with stretches and to perform strengthening at home to increase muscle strength. Pt agreed.    Goals (to be met in 17 visits)   Increase AROM (R) wrist RD by 5-10 degrees to allow pt to wash hair.   Increase BERGERON of (R) SF by 20-30 degrees to allow pt to wash dishes.   Pt will verbalize 2/3 proper body mechanics with 100% accuracy to be able to perform functional activities at home.   Increase (R)  strength by 5-10Ibs to allow pt to carry groceries.  Increase (R) 2pt pinch by 2-3Ibs to allow pt to hold a paint brush.  Increase (R)3pt pinch by 2-3Ibs to allow pt to open packages.  Increase (R) Lat pinch by 2-3Ibs to allow pt to cut with a knife.             Plan  Continue therapy for an additional 2x week/4 weeksx/week or a total of 17 visits over a 90 day period.    Treatment Last 4 Visits        6/3/2025 6/10/2025 6/12/2025 6/17/2025   OT Treatment   Treatment Day 11 12 13    13 14   Therapeutic Exercise PROM digits   Powerweb stretches x 3 min  Digiflex (red) x 2 min  Flexion strap        AROM w/ fluidotherapy x 10 min  Handhelper 2 Y RB, 1 R RB x 3 min  Digiflex (red) x 3 min  Wrist PRE's 2 # (all planes) x 2 min  each   AROM w/ fluidotherapy x 10 min  Powerweb (green) stretches x 3 min  Handhelper 2 Y RB, 1 R RB x 3 min  Digiflex (red) x 3 min  Wrist PRE's 2 # (all planes) x 2 min each   AROM w/ fluidotherapy x 10 min  Powerweb (green) stretches x 3 min  Theraband (green) wrist flexion,bicep flexion, brachialis flexion x 2 min each  Digiflex (red) x 2 min composite flexion   Manual Therapy STM   STM  Scar massage with extractor  Edema massage STM  Scar massage with extractor  Edema massage   STM  Scar massage with extractor  Edema massage   Modalities Paraffin with digits in flexion with coban wrap x 5\"      Therapeutic Exercise Min 30 35 35 35   Manual Therapy Min 10 10 10 10   Other Therapy Min 5      Total Timed Procedures 40 45 45 45   Total Service Procedures 45 45 45 45   Total Time 45 45 45 45       Multiple values from one day are sorted in reverse-chronological order              Charges     1MT, 2 TC Mccann,OTR/L

## 2025-06-19 ENCOUNTER — APPOINTMENT (OUTPATIENT)
Dept: PHYSICAL THERAPY | Age: 40
End: 2025-06-19
Attending: STUDENT IN AN ORGANIZED HEALTH CARE EDUCATION/TRAINING PROGRAM
Payer: COMMERCIAL

## 2025-06-23 RX ORDER — NORGESTIMATE AND ETHINYL ESTRADIOL 7DAYSX3 LO
1 KIT ORAL DAILY
Qty: 84 TABLET | Refills: 0 | Status: SHIPPED | OUTPATIENT
Start: 2025-06-23 | End: 2025-06-23

## 2025-06-23 NOTE — TELEPHONE ENCOUNTER
Patient called in to get a refill on the birth control pills.   Patient states she took her last pill on yesterday.

## 2025-06-24 ENCOUNTER — OFFICE VISIT (OUTPATIENT)
Dept: PHYSICAL THERAPY | Age: 40
End: 2025-06-24
Attending: STUDENT IN AN ORGANIZED HEALTH CARE EDUCATION/TRAINING PROGRAM
Payer: COMMERCIAL

## 2025-06-24 PROCEDURE — 97140 MANUAL THERAPY 1/> REGIONS: CPT

## 2025-06-24 PROCEDURE — 97110 THERAPEUTIC EXERCISES: CPT

## 2025-06-24 NOTE — PROGRESS NOTES
Patient: Gisell Zepeda (40 year old, female) Referring Provider:  Insurance:   Diagnosis: Carpal tunnel syndrome of right wrist (G56.01)  Finger stiffness, right (M25.641) Luis Mayfield  [facility]   Date of Surgery: No data recorded Next MD visit:  N/A   Precautions:  None 5/23/2025 Referral Information:   Date of Injury: No data recorded Date of Evaluation: Req: 17, Auth: 17, Exp: 8/31/2025 04/17/25 POC Auth Visits:  17       Today's Date   6/24/2025    Subjective  Pt states that her finger is bending a little more.       Pain: 0/10     Objective  See tx details             Assessment  Patient presents with residual stiffness in the right small finger contributing to incomplete fist formation and mild functional limitation. Edema is mild and not significantly impeding function. Despite ROM limitations, the patient is compensating well and using the right hand for the majority of tasks. Progress indicates readiness for discharge with continued home program.    Goals (to be met in 17 visits)   Increase AROM (R) wrist RD by 5-10 degrees to allow pt to wash hair.   Increase BERGERON of (R) SF by 20-30 degrees to allow pt to wash dishes.   Pt will verbalize 2/3 proper body mechanics with 100% accuracy to be able to perform functional activities at home.   Increase (R)  strength by 5-10Ibs to allow pt to carry groceries.  Increase (R) 2pt pinch by 2-3Ibs to allow pt to hold a paint brush.  Increase (R)3pt pinch by 2-3Ibs to allow pt to open packages.  Increase (R) Lat pinch by 2-3Ibs to allow pt to cut with a knife.                 Plan  Continue therapy for an additional 2x week/4 weeksx/week or a total of 17 visits over a 90 day period.    Treatment Last 4 Visits        6/10/2025 6/12/2025 6/17/2025 6/24/2025   OT Treatment   Treatment Day 12 13    13 14 15   Therapeutic Exercise AROM w/ fluidotherapy x 10 min  Handhelper 2 Y RB, 1 R RB x 3 min  Digiflex (red) x 3 min  Wrist PRE's 2 # (all planes) x 2 min each    AROM w/ fluidotherapy x 10 min  Powerweb (green) stretches x 3 min  Handhelper 2 Y RB, 1 R RB x 3 min  Digiflex (red) x 3 min  Wrist PRE's 2 # (all planes) x 2 min each   AROM w/ fluidotherapy x 10 min  Powerweb (green) stretches x 3 min  Theraband (green) wrist flexion,bicep flexion, brachialis flexion x 2 min each  Digiflex (red) x 2 min composite flexion AROM w/ fluidotherapy x 10 min  Powerweb push, pull and twist x 3 min  Theraband (green) wrist flexion,bicep flexion, brachialis flexion x 2 min each  Handhelper with 2Y RB and 1 R RB x 3 min     Manual Therapy STM  Scar massage with extractor  Edema massage STM  Scar massage with extractor  Edema massage   STM  Scar massage with extractor  Edema massage STM  Scar massage with extractor  Edema massage   Therapeutic Exercise Min 35 35 35 35   Manual Therapy Min 10 10 10 10   Total Timed Procedures 45 45 45 45   Total Service Procedures 45 45 45 45   Total Time 45 45 45 45       Multiple values from one day are sorted in reverse-chronological order              Charges     1 MT, 2 TC Mccann,OTR/L

## 2025-06-26 ENCOUNTER — OFFICE VISIT (OUTPATIENT)
Dept: PHYSICAL THERAPY | Age: 40
End: 2025-06-26
Attending: STUDENT IN AN ORGANIZED HEALTH CARE EDUCATION/TRAINING PROGRAM
Payer: COMMERCIAL

## 2025-06-26 PROCEDURE — 97110 THERAPEUTIC EXERCISES: CPT

## 2025-06-26 NOTE — PROGRESS NOTES
Patient: Gisell Zepeda (40 year old, female) Referring Provider:  Insurance:   Diagnosis: Carpal tunnel syndrome of right wrist (G56.01)  Finger stiffness, right (M25.641) Luis Umanzoryt  [facility]   Date of Surgery: No data recorded Next MD visit:  N/A   Precautions:  None 5/23/2025 Referral Information:   Date of Injury: No data recorded Date of Evaluation: Req: 17, Auth: 17, Exp: 8/31/2025 04/17/25 POC Auth Visits:  17       Today's Date   6/26/2025    Subjective  Pt states that she will continue to work on the exercises at home.       Pain: 0/10     Objective  See tx details    Rt Hand       4/17/2025 5/23/2025 6/26/2025   R Hand ROM/MMT   MP Index Rt 60 75    PIP Index RT 70 95    DIP Index RT 45 65    BERGERON Index  235    MP Middle RT 56 75    PIP Middle RT 65 90    DIP Middle RT 50 70    BERGERON Middle  235    MP Ring RT 45 75    PIP Ring RT 70 90    DIP Ring RT 45 70    BERGERON Ring  235    MP Small RT 30 65 70   PIP Small RT 45 60 60   DIP Small RT 25 40 40   BERGERON Small  165     Hand Strength       5/23/2025 6/26/2025   Hand Strength    Rt 17 33    Lt 37    2 Pt Pinch Rt 6 9   2 Pt Pinch Lt 8    3 Pt Pinch Rt 9 10   3 Pt Pinch Lt 10    Lateral Pinch Rt 11 12   Lateral Pinch Lt 13              Assessment  Patient was seen for 16 visits following evaluation on 4/17/2025 for carpal tunnel syndrome of the right wrist and right finger stiffness.  Treatment has consisted of therapeutic exercises, therapeutic activities, manual therapy, scar/edema mangement, modalities prn such as fluidotherapy, moist heat pack, pt education including proper body mechanics, neuromuscluar reeducation, and HEP.  Improved ROM,  and pinch strength noted since initial evaluation.Throughout the course of therapy, the patient demonstrated significant improvement in ROM and strength, including , 3-point pinch, 2-point pinch, and lateral pinch. She now uses her right hand for most functional tasks, including  light household activities, self-care, meal preparation, and handwriting, with improved coordination and endurance. Mild residual stiffness remains at the PIP and DIP joints of the right small finger, though full PROM is achieved, indicating the limitation is likely due to tendon or soft tissue tightness rather than joint restriction. Patient was issued pink putty for continued strengthening and instructed in blocking exercises to further address fine motor control and residual stiffness. She reports no current pain and is managing activities without difficulty. Patient is appropriate for discharge with continued adherence to home exercise program.    Goals (to be met in 17 visits)   Increase AROM (R) wrist RD by 5-10 degrees to allow pt to wash hair.  MET  Increase BERGERON of (R) SF by 20-30 degrees to allow pt to wash dishes. PARTIALLY MET  Pt will verbalize 2/3 proper body mechanics with 100% accuracy to be able to perform functional activities at home. MET  Increase (R)  strength by 5-10Ibs to allow pt to carry groceries. MET  Increase (R) 2pt pinch by 2-3Ibs to allow pt to hold a paint brush.MET  Increase (R)3pt pinch by 2-3Ibs to allow pt to open packages.MET  Increase (R) Lat pinch by 2-3Ibs to allow pt to cut with a knife.MET         Plan   Discharge pt from OT. Pt to continue with HEP and follow up with MD as needed.     Treatment Last 4 Visits        6/12/2025 6/17/2025 6/24/2025 6/26/2025   OT Treatment   Treatment Day 13    13 14 15 16   Therapeutic Exercise AROM w/ fluidotherapy x 10 min  Powerweb (green) stretches x 3 min  Handhelper 2 Y RB, 1 R RB x 3 min  Digiflex (red) x 3 min  Wrist PRE's 2 # (all planes) x 2 min each   AROM w/ fluidotherapy x 10 min  Powerweb (green) stretches x 3 min  Theraband (green) wrist flexion,bicep flexion, brachialis flexion x 2 min each  Digiflex (red) x 2 min composite flexion AROM w/ fluidotherapy x 10 min  Powerweb push, pull and twist x 3 min  Theraband (green) wrist  flexion,bicep flexion, brachialis flexion x 2 min each  Handhelper with 2Y RB and 1 R RB x 3 min   AROM with fluidotherapy x 10\"  Objective measurements x 10\"  Therapy putty strengthening  Digit blocking flexion/extension   Manual Therapy STM  Scar massage with extractor  Edema massage   STM  Scar massage with extractor  Edema massage STM  Scar massage with extractor  Edema massage    Therapeutic Exercise Min 35 35 35 45   Manual Therapy Min 10 10 10    Total Timed Procedures 45 45 45 45   Total Service Procedures 45 45 45 45   Total Time 45 45 45 45       Multiple values from one day are sorted in reverse-chronological order         HEP  [place] therapy putty and strengthening    Charges     3 TE    Jessica Mccann,OTR/L

## 2025-07-17 ENCOUNTER — OFFICE VISIT (OUTPATIENT)
Dept: OBGYN CLINIC | Facility: CLINIC | Age: 40
End: 2025-07-17
Payer: COMMERCIAL

## 2025-07-17 VITALS
HEIGHT: 59 IN | WEIGHT: 139.19 LBS | DIASTOLIC BLOOD PRESSURE: 76 MMHG | SYSTOLIC BLOOD PRESSURE: 136 MMHG | HEART RATE: 91 BPM | BODY MASS INDEX: 28.06 KG/M2

## 2025-07-17 DIAGNOSIS — Z30.41 ENCOUNTER FOR SURVEILLANCE OF CONTRACEPTIVE PILLS: Primary | ICD-10-CM

## 2025-07-17 DIAGNOSIS — Z30.09 ENCOUNTER FOR GENERAL COUNSELING AND ADVICE ON CONTRACEPTIVE MANAGEMENT: ICD-10-CM

## 2025-07-17 PROCEDURE — 3075F SYST BP GE 130 - 139MM HG: CPT | Performed by: STUDENT IN AN ORGANIZED HEALTH CARE EDUCATION/TRAINING PROGRAM

## 2025-07-17 PROCEDURE — 3078F DIAST BP <80 MM HG: CPT | Performed by: STUDENT IN AN ORGANIZED HEALTH CARE EDUCATION/TRAINING PROGRAM

## 2025-07-17 PROCEDURE — 3008F BODY MASS INDEX DOCD: CPT | Performed by: STUDENT IN AN ORGANIZED HEALTH CARE EDUCATION/TRAINING PROGRAM

## 2025-07-17 PROCEDURE — 99213 OFFICE O/P EST LOW 20 MIN: CPT | Performed by: STUDENT IN AN ORGANIZED HEALTH CARE EDUCATION/TRAINING PROGRAM

## 2025-07-17 NOTE — PROGRESS NOTES
[facility]  Obstetrics and Gynecology  Gyne Problem Visit      The following individual(s) verbally consented to be recorded using ambient AI listening technology and understand that they can each withdraw their consent to this listening technology at any point by asking the clinician to turn off or pause the recording:    Patient name: Gisell Zepeda    History of Present Illness  Gisell Zepeda is a 40 year old female who presents for a birth control refill.    She has been using Tri-lo-jamia, a combination oral contraceptive pill, for over a year. She occasionally forgets to take it, having missed it twice in the past year, and is considering other birth control options.    Her menstrual cycles have always been irregular. Since starting the pill, her menstrual flow has significantly decreased. Previously experiencing heavy periods with clots, she now often manages with just a liner. Her periods are unpredictable but typically occur during the last week of her pill cycle, lasting two to four days with minimal spotting.    She has a history of diabetes and underwent gastric bypass surgery three to four years ago, which improved her health. She is on Ozempic and phentermine for weight management, with phentermine also aiding in anxiety and binge eating control. She previously had high blood pressure, which has improved post-surgery. Not currently on any BP medication.    Patient's last menstrual period was 2025.     Pap:   Contraception:OCP    OBSTETRICS HISTORY:  OB History    Para Term  AB Living   0 0 0 0 0 0   SAB IAB Ectopic Multiple Live Births   0 0 0 0 0       GYNE HISTORY:  Hx Prior Abnormal Pap: Yes  Pap Date: 22  Pap Result Notes: wnl   Menarche: 13 (2025 10:09 AM)  Period Cycle (Days): 28 (2025 10:09 AM)  Period Duration (Days): 2-4 days (2025 10:09 AM)  Period Flow: light (2025 10:09 AM)  Use of Birth Control (if yes, specify type): OCP (2025 10:09 AM)  Hx  Prior Abnormal Pap: Yes (7/17/2025 10:09 AM)  Pap Date: 09/16/22 (7/17/2025 10:09 AM)  Pap Result Notes: wnl (7/17/2025 10:09 AM)        Latest Ref Rng & Units 9/16/2022    10:55 AM 10/22/2021     9:12 AM 8/2/2018     3:20 PM   RECENT PAP RESULTS   INTERPRETATION/RESULT: Negative for intraepithelial lesion or malignancy Negative for intraepithelial lesion or malignancy  Negative for intraepithelial lesion or malignancy - Positive for HPV  Negative for intraepithelial lesion or malignancy    HPV Negative Negative  Positive  Negative          History   Sexual Activity    Sexual activity: Not on file       MEDICAL HISTORY:  Past Medical History[1]  Past Surgical History[2]      MEDICATIONS:  Medications - Current[3]    ALLERGIES:  Allergies[4]      REVIEW OF SYSTEMS:  Review of Systems   Constitutional:  Negative for chills, fever and unexpected weight change.   Respiratory: Negative.     Cardiovascular: Negative.    Gastrointestinal:  Negative for abdominal pain, constipation, diarrhea and nausea.   Genitourinary:  Negative for dyspareunia, dysuria, genital sores, hematuria, menstrual problem, pelvic pain, vaginal bleeding, vaginal discharge and vaginal pain.   Musculoskeletal: Negative.    Skin: Negative.    Neurological: Negative.    Hematological: Negative.    Psychiatric/Behavioral: Negative.         PHYSICAL EXAM:  /76   Pulse 91   Ht 4' 11\" (1.499 m)   Wt 139 lb 3.2 oz (63.1 kg)   LMP 07/16/2025   Breastfeeding No   BMI 28.11 kg/m²     Chaperone offered, pt declined.    GENERAL: well developed, well nourished, in no apparent distress    Assesment & Plan:  Assessment & Plan  Contraceptive Management  She considered various contraceptive options due to forgetfulness and concerns about weight gain with Depo Provera. Discussed options include vaginal ring, patch, Depo Provera, and Nexplanon. She plans to try Depo Provera with the option to return to the pill if unsatisfied.  - Provide two-month refill  for Trolamine.  - Discuss contraceptive options: vaginal ring, patch, Depo Provera, and Nexplanon.  - Schedule follow-up in one month for contraceptive decision and Pap smear.    General Health Maintenance  Due for Pap smear, last done in 2022. Prefer to avoid during menstruation.  - Schedule Pap smear in four weeks, avoiding menstruation.      ORDERS:   No orders of the defined types were placed in this encounter.    PRESCRIPTIONS:     Requested Prescriptions      No prescriptions requested or ordered in this encounter     IMAGING/ REFERRALS:    None   FOLLOW-UP     Return in about 4 weeks (around 8/14/2025) for Annual and Pap smear.    ARTIE ARMSTRONG PA-C  10:27 AM  7/17/2025        Spent total time 20 minutes on obtaining history / chart review, evaluating patient / performing medically appropriate exam, discussing treatment options, counseling / educating, and completing documentation, coordinating care.           [1]   Past Medical History:  Diagnosis Date    Abrasion of toe of left foot 10/30/2020    Class 3 severe obesity due to excess calories with serious comorbidity and body mass index (BMI) of 40.0 to 44.9 in adult 09/14/2020    Decorative tattoo     Diabetes ([facility])     Diabetes mellitus ([facility])     Diabetes mellitus, type 2 ([facility]) 09/14/2020    Dysmenorrhea     Sometimes    Dyspareunia     Sometimes    Essential hypertension     High blood pressure     High cholesterol     High cholesterol 01/14/2019    History of blood transfusion 2021    no reactions    Hypercholesterolemia with hypertriglyceridemia     Intertrigo 02/03/2022    Morbid (severe) obesity due to excess calories ([facility]) 02/03/2022    Morbid obesity with BMI of 45.0-49.9, adult ([facility]) 02/13/2020    Morbid obesity with BMI of 50.0-59.9, adult ([facility])     S/P gastric bypass 09/14/2020    Uncontrolled type 2 diabetes mellitus with hyperglycemia ([facility]) 06/08/2018    Visual impairment     GLASSES   [2]   Past Surgical History:  Procedure Laterality  Date    Abdominal surgery      Breast surgery      Cosmetic surgery      Hc surgery catheter eps dx/ablation other than 3d [id number]      abdominoplasty, removal of excess skin on bilateral arms    Implantable breast prosthesis      Lap gastric bypass/nayan-en-y  09/14/2020    Dr Black, [facility]   [3]   Current Outpatient Medications:     Norgestimate-Eth Estradiol (TRI-LO-MARTIN) 0.18/0.215/0.25 MG-25 MCG Oral Tab, Take 1 tablet by mouth daily., Disp: 84 tablet, Rfl: 0    semaglutide (OZEMPIC, 0.25 OR 0.5 MG/DOSE,) 2 MG/3ML Subcutaneous Solution Pen-injector, Inject 0.5 mg into the skin once a week., Disp: 3 mL, Rfl: 11    cyanocobalamin 1000 MCG/ML Injection Solution, Inject 1 mL (1,000 mcg total) into the muscle every 30 (thirty) days., Disp: 1 each, Rfl: 11    Syringe/Needle, Disp, (BD ECLIPSE SYRINGE) 25G X 1\" 3 ML Does not apply Misc, Use a new syringe with each use, Disp: 1 each, Rfl: 11    Phentermine HCl 15 MG Oral Cap, Take 1 capsule (15 mg total) by mouth every morning., Disp: 30 capsule, Rfl: 5    atorvastatin 10 MG Oral Tab, Take 1 tablet (10 mg total) by mouth nightly., Disp: 90 tablet, Rfl: 3    fluconazole (DIFLUCAN) 150 MG Oral Tab, Take 1 tablet (150 mg total) by mouth every other day., Disp: 2 tablet, Rfl: 0    FLUoxetine 20 MG Oral Cap, Take 1 capsule (20 mg total) by mouth daily., Disp: 30 capsule, Rfl: 2    HYDROcodone-acetaminophen 5-325 MG Oral Tab, Take 1 tablet by mouth every 6 (six) hours as needed for Pain. No alcohol or driving on this med. Stop if lethargic or hallucinating.  Maximum 4000 mg Tylenol/acetaminophen daily from all sources.  Each Norco has 325 mg of Tylenol/acetaminophen in it., Disp: 12 tablet, Rfl: 0    senna-docusate 8.6-50 MG Oral Tab, Take 2 tablets by mouth at bedtime., Disp: 28 tablet, Rfl: 0    metFORMIN 500 MG Oral Tab, Take 1.5 tabs before breakfast and 1.5 tabs before dinner, Disp: 270 tablet, Rfl: 0    ondansetron (ZOFRAN) 4 mg tablet, Take 1 tablet (4 mg  total) by mouth every 8 (eight) hours as needed for Nausea., Disp: 10 tablet, Rfl: 0    Acetaminophen 500 MG Oral Cap, Take 2 capsules (1,000 mg total) by mouth every 8 (eight) hours as needed for Pain. Never exceed 3000 mg in a 24-hour period.  Many over-the-counter medications also have acetaminophen in them., Disp: 180 capsule, Rfl: 0    senna-docusate 8.6-50 MG Oral Tab, Take 2 tablets by mouth at bedtime., Disp: 28 tablet, Rfl: 0    Vitamin C 500 MG Oral Tab, Take 1 tablet (500 mg total) by mouth in the morning and 1 tablet (500 mg total) before bedtime., Disp: 84 tablet, Rfl: 0    Glucose Blood (ONETOUCH ULTRA) In Vitro Strip, CHECK BLOOD SUGAR TWICE DAILY, Disp: 200 strip, Rfl: 3    fluconazole 200 MG Oral Tab, Take 4 tablets (800 mg total) by mouth daily., Disp: 28 tablet, Rfl: 0    Lancets Does not apply Misc, Check blood sugar twice daily., Disp: 50 each, Rfl: 2    Blood Glucose Monitoring Suppl (ONETOUCH ULTRA 2) w/Device Does not apply Kit, Check blood sugar 2 times daily, Disp: 1 kit, Rfl: 0    Continuous Blood Gluc  (DEXCOM G6 ) Does not apply Device, 1 each by Does not apply route See Admin Instructions., Disp: 1 Device, Rfl: 0    Continuous Blood Gluc Sensor (DEXCOM G6 SENSOR) Does not apply Misc, 1 each by Does not apply route See Admin Instructions., Disp: 9 each, Rfl: 3    Continuous Blood Gluc Transmit (DEXCOM G6 TRANSMITTER) Does not apply Misc, 1 each by Does not apply route See Admin Instructions., Disp: 3 each, Rfl: 3    Multiple Vitamin (MULTIVITAMINS OR), Take 1 tablet by mouth in the morning. Bariatric vitamins., Disp: , Rfl:   [4] No Known Allergies

## 2025-07-17 NOTE — PROGRESS NOTES
The following individual(s) verbally consented to be recorded using ambient AI listening technology and understand that they can each withdraw their consent to this listening technology at any point by asking the clinician to turn off or pause the recording:    Patient name: Gisell Zepeda

## 2025-07-26 NOTE — TELEPHONE ENCOUNTER
Refill too soon.    Medication Quantity Refills Start End   FLUoxetine 20 MG Oral Cap 30 capsule 2 5/16/2025

## 2025-08-14 ENCOUNTER — OFFICE VISIT (OUTPATIENT)
Dept: OBGYN CLINIC | Facility: CLINIC | Age: 40
End: 2025-08-14

## 2025-08-14 VITALS
DIASTOLIC BLOOD PRESSURE: 87 MMHG | WEIGHT: 137.63 LBS | HEIGHT: 59 IN | BODY MASS INDEX: 27.75 KG/M2 | SYSTOLIC BLOOD PRESSURE: 144 MMHG

## 2025-08-14 DIAGNOSIS — Z30.013 INITIATION OF DEPO PROVERA: ICD-10-CM

## 2025-08-14 DIAGNOSIS — Z12.4 SCREENING FOR CERVICAL CANCER: ICD-10-CM

## 2025-08-14 DIAGNOSIS — Z01.419 WELL WOMAN EXAM WITH ROUTINE GYNECOLOGICAL EXAM: Primary | ICD-10-CM

## 2025-08-14 LAB
CONTROL LINE PRESENT WITH A CLEAR BACKGROUND (YES/NO): YES YES/NO
KIT LOT #: NORMAL NUMERIC

## 2025-08-14 PROCEDURE — 3079F DIAST BP 80-89 MM HG: CPT | Performed by: STUDENT IN AN ORGANIZED HEALTH CARE EDUCATION/TRAINING PROGRAM

## 2025-08-14 PROCEDURE — 96372 THER/PROPH/DIAG INJ SC/IM: CPT | Performed by: STUDENT IN AN ORGANIZED HEALTH CARE EDUCATION/TRAINING PROGRAM

## 2025-08-14 PROCEDURE — 81025 URINE PREGNANCY TEST: CPT | Performed by: STUDENT IN AN ORGANIZED HEALTH CARE EDUCATION/TRAINING PROGRAM

## 2025-08-14 PROCEDURE — 3008F BODY MASS INDEX DOCD: CPT | Performed by: STUDENT IN AN ORGANIZED HEALTH CARE EDUCATION/TRAINING PROGRAM

## 2025-08-14 PROCEDURE — 99396 PREV VISIT EST AGE 40-64: CPT | Performed by: STUDENT IN AN ORGANIZED HEALTH CARE EDUCATION/TRAINING PROGRAM

## 2025-08-14 PROCEDURE — 3077F SYST BP >= 140 MM HG: CPT | Performed by: STUDENT IN AN ORGANIZED HEALTH CARE EDUCATION/TRAINING PROGRAM

## 2025-08-14 RX ORDER — MEDROXYPROGESTERONE ACETATE 150 MG/ML
150 INJECTION, SUSPENSION INTRAMUSCULAR ONCE
Status: COMPLETED | OUTPATIENT
Start: 2025-08-14 | End: 2025-08-14

## 2025-08-14 RX ADMIN — MEDROXYPROGESTERONE ACETATE 150 MG: 150 INJECTION, SUSPENSION INTRAMUSCULAR at 09:48:00

## 2025-08-15 LAB — HPV E6+E7 MRNA CVX QL NAA+PROBE: POSITIVE

## 2025-08-18 ENCOUNTER — TELEPHONE (OUTPATIENT)
Dept: OBGYN CLINIC | Facility: CLINIC | Age: 40
End: 2025-08-18

## 2025-08-19 LAB
HPV16 DNA CVX QL PROBE+SIG AMP: NEGATIVE
HPV18 DNA CVX QL PROBE+SIG AMP: NEGATIVE

## 2025-08-25 ENCOUNTER — OFFICE VISIT (OUTPATIENT)
Dept: OBGYN CLINIC | Facility: CLINIC | Age: 40
End: 2025-08-25

## 2025-08-25 VITALS — DIASTOLIC BLOOD PRESSURE: 94 MMHG | BODY MASS INDEX: 28 KG/M2 | SYSTOLIC BLOOD PRESSURE: 151 MMHG | WEIGHT: 138 LBS

## 2025-08-25 DIAGNOSIS — R87.610 ASCUS WITH POSITIVE HIGH RISK HPV CERVICAL: ICD-10-CM

## 2025-08-25 DIAGNOSIS — Z01.812 PRE-PROCEDURAL LABORATORY EXAMINATION: Primary | ICD-10-CM

## 2025-08-25 DIAGNOSIS — R87.810 ASCUS WITH POSITIVE HIGH RISK HPV CERVICAL: ICD-10-CM

## 2025-08-25 LAB
CONTROL LINE PRESENT WITH A CLEAR BACKGROUND (YES/NO): YES YES/NO
KIT LOT #: NORMAL NUMERIC

## 2025-08-25 PROCEDURE — 99213 OFFICE O/P EST LOW 20 MIN: CPT | Performed by: OBSTETRICS & GYNECOLOGY

## 2025-08-25 PROCEDURE — 57454 BX/CURETT OF CERVIX W/SCOPE: CPT | Performed by: OBSTETRICS & GYNECOLOGY

## 2025-08-25 PROCEDURE — 3077F SYST BP >= 140 MM HG: CPT | Performed by: OBSTETRICS & GYNECOLOGY

## 2025-08-25 PROCEDURE — 3080F DIAST BP >= 90 MM HG: CPT | Performed by: OBSTETRICS & GYNECOLOGY

## 2025-08-25 PROCEDURE — 81025 URINE PREGNANCY TEST: CPT | Performed by: OBSTETRICS & GYNECOLOGY

## (undated) DIAGNOSIS — E11.3293 MILD NONPROLIFERATIVE DIABETIC RETINOPATHY OF BOTH EYES WITHOUT MACULAR EDEMA ASSOCIATED WITH TYPE 2 DIABETES MELLITUS (HCC): ICD-10-CM

## (undated) DIAGNOSIS — E11.65 UNCONTROLLED TYPE 2 DIABETES MELLITUS WITH HYPERGLYCEMIA (HCC): Primary | ICD-10-CM

## (undated) DIAGNOSIS — I10 ESSENTIAL HYPERTENSION: ICD-10-CM

## (undated) DIAGNOSIS — E11.649 TYPE 2 DIABETES MELLITUS WITH HYPOGLYCEMIA WITHOUT COMA, WITHOUT LONG-TERM CURRENT USE OF INSULIN (HCC): ICD-10-CM

## (undated) DIAGNOSIS — E66.9 OBESITY (BMI 30-39.9): ICD-10-CM

## (undated) DIAGNOSIS — Z98.84 S/P GASTRIC BYPASS: ICD-10-CM

## (undated) DIAGNOSIS — E11.649 TYPE 2 DIABETES MELLITUS WITH HYPOGLYCEMIA WITHOUT COMA, WITHOUT LONG-TERM CURRENT USE OF INSULIN (HCC): Primary | ICD-10-CM

## (undated) DEVICE — DISSECTOR SONICISION CORDLESS

## (undated) DEVICE — ANTIBACTERIAL UNDYED BRAIDED (POLYGLACTIN 910), SYNTHETIC ABSORBABLE SUTURE: Brand: COATED VICRYL

## (undated) DEVICE — CABLE BPLR L12FT FLYING LD DISP

## (undated) DEVICE — Device

## (undated) DEVICE — VIOLET BRAIDED (POLYGLACTIN 910), SYNTHETIC ABSORBABLE SUTURE: Brand: COATED VICRYL

## (undated) DEVICE — SPCMN DTCHBLE POUCH 5X7 500ML

## (undated) DEVICE — ENCORE® LATEX MICRO SIZE 7.5, STERILE LATEX POWDER-FREE SURGICAL GLOVE: Brand: ENCORE

## (undated) DEVICE — ENDOSTITCH SURGIDAC 0

## (undated) DEVICE — UNDYED BRAIDED (POLYGLACTIN 910), SYNTHETIC ABSORBABLE SUTURE: Brand: COATED VICRYL

## (undated) DEVICE — TROCARS: Brand: KII® BALLOON BLUNT TIP SYSTEM

## (undated) DEVICE — SUTURE ENDOSTITCH 2-0 VIO 48\"

## (undated) DEVICE — SOLUTION IRRIG 1000ML 0.9% NACL USP BTL

## (undated) DEVICE — SOL  .9 1000ML BAG

## (undated) DEVICE — SUT ETHLN 4-0 18IN PS-2 NABSRB BLK 19MM 3/8 C

## (undated) DEVICE — TROCAR: Brand: KII FIOS FIRST ENTRY

## (undated) DEVICE — METZENBAUM ADTEC SINGLE USE DISSECTING SCISSORS, SHAFT ONLY, MONOPOLAR, CURVED TO LEFT, WORKING LENGTH: 16 1/2", (420 MM), DIAM. 5 MM, INSULATED, DOUBLE ACTION, STERILE, DISPOSABLE, PACKAGE OF 10 PIECES: Brand: AESCULAP

## (undated) DEVICE — SOLUTION PREP 26ML 0.7% POVACRYLEX 74% ISO

## (undated) DEVICE — SUTURE PDS II 1 CT-1

## (undated) DEVICE — SUTURING DEVICE: Brand: ENDO STITCH

## (undated) DEVICE — 1010 S-DRAPE TOWEL DRAPE 10/BX: Brand: STERI-DRAPE™

## (undated) DEVICE — 3M™ STERI-DRAPE™ INSTRUMENT POUCH 1018L: Brand: STERI-DRAPE™

## (undated) DEVICE — ENDOPATH ECHELON ENDOSCOPIC LINEAR CUTTER RELOADS, GREEN, 60MM: Brand: ECHELON ENDOPATH

## (undated) DEVICE — APPLICATOR PREP 26ML CHG 2% ISO ALC 70%

## (undated) DEVICE — ENDOSCOPY PORT CONNECTOR FOR OLYMPUS® SCOPES: Brand: ERBE

## (undated) DEVICE — DERMABOND LIQUID ADHESIVE

## (undated) DEVICE — SUT MCRYL 4-0 18IN PS-2 ABSRB UD 19MM 3/8 CIR

## (undated) DEVICE — DISPOSABLE TOURNIQUET CUFF SINGLE BLADDER, DUAL PORT AND QUICK CONNECT CONNECTOR: Brand: COLOR CUFF

## (undated) DEVICE — STERILE SURGICAL LUBRICANT, METAL TUBE: Brand: SURGILUBE

## (undated) DEVICE — SEAM GUARD 60 ECHELON

## (undated) DEVICE — C-ARM: Brand: UNBRANDED

## (undated) DEVICE — ENDOPATH ECHELON ENDOSCOPIC LINEAR CUTTER RELOADS, BLUE, 60MM: Brand: ECHELON ENDOPATH

## (undated) DEVICE — DRAPE SHEET LG

## (undated) DEVICE — PADDING,UNDERCAST,COTTON, 3X4YD STERILE: Brand: MEDLINE

## (undated) DEVICE — SOLUTION ENDOSCOPIC ANTI-FOG NON-TOXIC NON-ABRASIVE 6 CUBIC CENTIMETER WITH RADIOPAQUE ADHESIVE-BACKED SPONGE STERILE NOT MADE WITH NATURAL RUBBER LATEX MEDICHOICE: Brand: MEDICHOICE

## (undated) DEVICE — LAP CHOLE: Brand: MEDLINE INDUSTRIES, INC.

## (undated) DEVICE — PACK CDS UPPER EXTREMITY

## (undated) DEVICE — HOVERMATT 34IN SINGLE USE

## (undated) DEVICE — SCANLAN® PINCAP® ORTHOPEDIC PIN PROTECTORS - WHITE, FITS 3 PIN/WIRE SIZES: 0.71, 0.89, 1.14 MM (2/STERILE PKG): Brand: SCANLAN® PINCAP® ORTHOPEDIC PIN PROTECTORS

## (undated) DEVICE — ADHESIVE SKIN TOP FOR WND CLSR DERMBND ADV

## (undated) DEVICE — SOL H2O 1000ML BTL

## (undated) DEVICE — STAPLER EEA XL 25MM

## (undated) DEVICE — MEDI-VAC NON-CONDUCTIVE SUCTION TUBING: Brand: CARDINAL HEALTH

## (undated) DEVICE — ENCORE® LATEX MICRO SIZE 8, STERILE LATEX POWDER-FREE SURGICAL GLOVE: Brand: ENCORE

## (undated) DEVICE — COTTON UNDERCAST PADDING,REGULAR FINISH: Brand: WEBRIL

## (undated) DEVICE — GLOVE SUR 8 SENSICARE PI PIP CRM PWD F

## (undated) DEVICE — Device: Brand: JELCO

## (undated) DEVICE — HYBRID TUBING/CAP SET FOR OLYMPUS® SCOPES: Brand: ERBE

## (undated) DEVICE — WOUND RETRACTOR AND PROTECTOR: Brand: ALEXIS WOUND PROTECTOR-RETRACTOR

## (undated) DEVICE — SUTURE PASSOR WITH GUIDE

## (undated) DEVICE — SUTURE SILK 0

## (undated) DEVICE — [HIGH FLOW INSUFFLATOR,  DO NOT USE IF PACKAGE IS DAMAGED,  KEEP DRY,  KEEP AWAY FROM SUNLIGHT,  PROTECT FROM HEAT AND RADIOACTIVE SOURCES.]: Brand: PNEUMOSURE

## (undated) DEVICE — INTENDED FOR TISSUE SEPARATION, AND OTHER PROCEDURES THAT REQUIRE A SHARP SURGICAL BLADE TO PUNCTURE OR CUT.: Brand: BARD-PARKER ® STAINLESS STEEL BLADES

## (undated) DEVICE — ENDOPATH ECHELON ENDOSCOPIC LINEAR CUTTER RELOADS, WHITE, 60MM: Brand: ECHELON ENDOPATH

## (undated) DEVICE — PROXIMATE SKIN STAPLERS (35 WIDE) CONTAINS 35 STAINLESS STEEL STAPLES (FIXED HEAD): Brand: PROXIMATE

## (undated) DEVICE — GAMMEX® PI HYBRID SIZE 8, STERILE POWDER-FREE SURGICAL GLOVE, POLYISOPRENE AND NEOPRENE BLEND: Brand: GAMMEX

## (undated) DEVICE — ECHELON FLEX POWERED PLUS LONG ARTICULATING ENDOSCOPIC LINEAR CUTTER, 60MM: Brand: ECHELON FLEX

## (undated) DEVICE — BNDG,ELSTC,MATRIX,STRL,4"X5YD,LF,HOOK&LP: Brand: MEDLINE

## (undated) NOTE — Clinical Note
Hi Dr. Aljeandro Dietrich,    I reviewed Gisell's blood work from January. Her B12 is low. I recommended she start 1000mcg of sublingual B12 daily, but I think she may also need injections. I encouraged her to follow up with you soon.      Thanks,  Arianne Lucero

## (undated) NOTE — LETTER
CONTINUOUS GLUCOSE MONITOR AND INSULIN PUMP AGREEMENT     CONTINUOUS GLUCOSE MONITOR (CGM) (If not signed, not applicable)     CGMs are not currently approved by the FDA for use in the hospital. If you choose to continue to wear your CGM in the hospital, we ask that you agree to the following:     ?   Allow finger stick blood glucose tests to be performed using hospital glucose monitor.   ?   Insulin dosing and hypoglycemia (low blood sugar) treatment will be provided based on hospital glucose monitor        results.   ?   Remove your CGM on or before the date it is due to be removed or as ordered by physician.   ?   Remove your CGM prior to any scheduled surgery and as instructed for procedures.   ?   Remove your CGM prior to Magnetic Resonance Imaging (MRI), Computerized Tomography (CT) or x-ray as it can      damage your CGM.   ?   Inform staff of any skin irritation, redness, or other problems with your skin or CGM.     __________________________________________________________ ________________________________   Patient/Guardian Signature                                                                                  Date/Time     __________________________________________________________   Print Guardian Name     __________________________________________________________ ________________________________   Staff/RN Signature                                                                                                 Date/Time      INSULIN PUMP (If not signed, not applicable)     For your safety and best possible care, we ask that you agree to the following. If you cannot agree to the following requirements of this agreement, or, if it is not possible for you to meet these requirements, we will provide and administer insulin based on your provider’s orders.     ?   Communicate your site changes, carbohydrate intake, and all boluses to your nurse.   ?   Provide your own insulin and pump supplies.   ?   Change  your infusion site at least every 3 days, and as needed for skin irritation or two consecutive glucose readings       greater than 240 mg/dL.   ?   Make no changes to your basal rates, carb ratios, or correction factor unless directed to do so by the physician        managing your insulin pump.   ?   Notify your nurse before you eat so that your blood glucose level can be obtained with hospital glucose monitor.   ?   Report symptoms of low blood sugar to your nurse immediately.   ?   Notify your nurse of any problems with your pump that you cannot correct.     I understand that my insulin pump may be discontinued and a different method of insulin delivery will be provided for any of the following reasons:     ?   Inability to safely perform diabetes self-management activities because of the condition for which I was hospitalized,        or side effects of my treatment.   ? Inability or inconsistency in performing the diabetes self-management activities described above.     __________________________________________________________ ________________________________   Patient/Guardian Signature                                                                                 Date/Time     __________________________________________________________   Print Guardian Name     __________________________________________________________ ________________________________   Staff/RN Signature                                                                                                 Date/Time       Patient Name: Gisell Zepeda     : 1985                 Printed: 2024     Medical Record #: [id number]                                            Page         INSULIN PUMP NURSING CHECKLIST   ON ADMISSION     ? Document insulin pump in Medical Devices/Implants section of Epic (even if patient not wearing pump in hospital).   ? Use link in Insulin Pump BPA to print the CGM/Insulin Pump Agreement and Checklists.     ? Apply patient label to Agreement and have patient read and sign it. Place on chart.   ? Add Insulin Pump LDA to the IV Assessment flowsheet.   ? Endocrinology to be consulted (except at [facility]):    ? Valentin: Attending to initiate consult.    ?  Matias: Open endocrinologist order through BPA (no cosign required). Page on-call endocrinologist          through [employer].    ?  Ulices Calix: Notify attending of insulin pump.     EVERY SHIFT     ? Document pump site assessment and any site changes. ADMISSION   ? Chart ALL insulin bolus doses in MAR as “Self-administered via pump”.     PROTOCOL REMINDERS     ? Insulin Pump Focused order set is to be used for all patients using an insulin pump.   ? Patient to provide own pump supplies and insulin.   ? Point of care glucose to be performed using hospital glucometer, even if wearing a continuous glucose           monitor.     NOTIFY ENDOCRINOLOGY OR MFM  FOR: (at [facility], notify attending)     Temporary discontinuation of pump infusion for more than one hour   Surgery   Change in patient condition, mental status, or compliance that prohibits ability to self-manage the insulin pump   Patient reports insulin pump not operating properly   Patient does not have appropriate insulin or supplies for insulin pump   Risk for suicide   Blood glucose outside ranges indicated in insulin pump orders       DO NOT REMOVE INSULIN PUMP WITHOUT ORDER FROM ENDOCRINOLOGIST/MGM -  or attending at [facility]     NOT PART OF PERMANENT CHART     Patient Name: Gisell Zepeda     : 1985                 Printed: 2024     Medical Record #: [id number]                                            Page  FOR: (at [facility],     CONTINUOUS GLUCOSE MONITOR (CGM) NURSING CHECKLIST     A Continuous Glucose Monitor (CGM), also referred to as a “sensor”, is a small device that takes frequent blood glucose (BG) readings, approximately every 5 minutes.   BG is measured in interstitial fluid by  a tiny electrode under the skin.   The CGM can be worn up to 10-14 days, depending on the model.   Often used along with an insulin pump, but may also be a stand-alone device.      ON ADMISSION     ?   Document CGM in Medical Devices/Implants in Admission Navigator  ?   When BPA fires, print the GM/Insulin Pump Agreement, place patient label, have patient read and sign and place on        chart.   ?   Add CGM LDA to the IV Assessment flowsheet   ?   Inform patient that HOSPITAL GLUCOSE MONITOR will be used for BG testing   ?   CGM not approved by FDA for inpatient use    ?   Frequent quality tests are performed on hospital glucose monitor                ?   Results of hospital glucose monitor cross over into electronic medical record   ?   Some medications interfere with CGM models including acetaminophen, aspirin, and Vitamin C    IMPORTANT INFORMATION     ? Point of care glucose to be performed using HOSPITAL glucose monitor.   ? Do not treat with insulin or treat hypoglycemia based on CGM values.   ? Patient to remove CGM before MRI, CT, x-ray, or any procedure that uses radiation such as ERCP,         fluoroscopic exam, IV Pyelogram, mammogram, cardiac cath, etc.   ? Exposure to above imaging may damage the CGM causing inaccurate BG readings, or prevent the         device from alarming.   ? Patient to remove CGM prior to surgery as it is often unknown if x-rays or other imaging will be used.   ? If CGM is removed or falls off, give to patient or family, or store according to hospital policy. Not all parts         are disposable and replacement can be very expensive.   ? Every shift, document CGM site assessment   ? Chart removal of CGM     NOTIFY ATTENDING/ENDOCRINOLOGIST IF:     ? Patient refuses to allow finger stick tests with hospital glucose monitor.   ? Any problems or irritation at CGM site.      NOT PART OF PERMANENT CHART  Patient Name: Gisell Zepeda     : 1985                 Printed: September  25, 2024     Medical Record #: [id number]                                            Page 1/1 24/7

## (undated) NOTE — LETTER
12/15/2022      To Whom It May Concern:    Carolann Juarez is currently under my medical care and may not return to work at this time. She may return to work on 1/14/2023 Pending clearance from surgeon. If you require additional information please contact our office.         Sincerely,    FADY Monroy            Document generated by:  FADY Monroy

## (undated) NOTE — LETTER
Patient Name: Gisell Zepeda  YOB: 1985          MRN :  [id number]  Date:  5/23/2025  Referring Physician:  Luis Mayfield                 21st Century Cures Act Notice to Patient: Medical documents like this are made available to patients in the interest of transparency. However, be advised this is a medical document and it is intended as [id number] communication between your medical providers. This medical document may contain abbreviations, assessments, medical data, and results or other terms that are unfamiliar. Medical documents are intended to carry relevant information, facts as evident, and the clinical opinion of the practitioner. As such, this medical document may be written in language that appears blunt or direct. You are encouraged to contact your medical provider and/or [facility] Patient Experience if you have any questions about this medical document.